# Patient Record
Sex: FEMALE | Race: BLACK OR AFRICAN AMERICAN | NOT HISPANIC OR LATINO | Employment: UNEMPLOYED | URBAN - METROPOLITAN AREA
[De-identification: names, ages, dates, MRNs, and addresses within clinical notes are randomized per-mention and may not be internally consistent; named-entity substitution may affect disease eponyms.]

---

## 2020-08-17 ENCOUNTER — HOSPITAL ENCOUNTER (EMERGENCY)
Facility: CLINIC | Age: 65
Discharge: HOME | End: 2020-08-17
Attending: NURSE PRACTITIONER | Admitting: NURSE PRACTITIONER
Payer: MEDICAID

## 2020-08-17 ENCOUNTER — HOSPITAL ENCOUNTER (INPATIENT)
Facility: CLINIC | Age: 65
LOS: 4 days | Discharge: HOME HEALTH | End: 2020-08-21
Attending: EMERGENCY MEDICINE | Admitting: HOSPITALIST
Payer: MEDICAID

## 2020-08-17 ENCOUNTER — APPOINTMENT (OUTPATIENT)
Dept: GENERAL RADIOLOGY | Facility: CLINIC | Age: 65
End: 2020-08-17
Attending: EMERGENCY MEDICINE
Payer: MEDICAID

## 2020-08-17 VITALS
RESPIRATION RATE: 20 BRPM | BODY MASS INDEX: 19.63 KG/M2 | TEMPERATURE: 98.3 F | DIASTOLIC BLOOD PRESSURE: 87 MMHG | WEIGHT: 100 LBS | SYSTOLIC BLOOD PRESSURE: 138 MMHG | HEIGHT: 60 IN | HEART RATE: 93 BPM | OXYGEN SATURATION: 96 %

## 2020-08-17 DIAGNOSIS — A41.9 SEPSIS, DUE TO UNSPECIFIED ORGANISM, UNSPECIFIED WHETHER ACUTE ORGAN DYSFUNCTION PRESENT (H): ICD-10-CM

## 2020-08-17 DIAGNOSIS — N39.0 URINARY TRACT INFECTION WITHOUT HEMATURIA, SITE UNSPECIFIED: ICD-10-CM

## 2020-08-17 DIAGNOSIS — R30.0 DYSURIA: ICD-10-CM

## 2020-08-17 DIAGNOSIS — G93.41 METABOLIC ENCEPHALOPATHY: ICD-10-CM

## 2020-08-17 DIAGNOSIS — R73.9 HYPERGLYCEMIA: ICD-10-CM

## 2020-08-17 DIAGNOSIS — N39.0 UTI (URINARY TRACT INFECTION): ICD-10-CM

## 2020-08-17 LAB
ALBUMIN SERPL-MCNC: 3.3 G/DL (ref 3.4–5)
ALBUMIN UR-MCNC: 30 MG/DL
ALP SERPL-CCNC: 90 U/L (ref 40–150)
ALT SERPL W P-5'-P-CCNC: 20 U/L (ref 0–50)
ANION GAP SERPL CALCULATED.3IONS-SCNC: 8 MMOL/L (ref 3–14)
APPEARANCE UR: ABNORMAL
AST SERPL W P-5'-P-CCNC: 14 U/L (ref 0–45)
BASE DEFICIT BLDV-SCNC: 0.6 MMOL/L
BASOPHILS # BLD AUTO: 0 10E9/L (ref 0–0.2)
BASOPHILS NFR BLD AUTO: 0 %
BILIRUB SERPL-MCNC: 0.6 MG/DL (ref 0.2–1.3)
BILIRUB UR QL STRIP: NEGATIVE
BUN SERPL-MCNC: 15 MG/DL (ref 7–30)
CALCIUM SERPL-MCNC: 9.1 MG/DL (ref 8.5–10.1)
CHLORIDE SERPL-SCNC: 105 MMOL/L (ref 94–109)
CO2 SERPL-SCNC: 22 MMOL/L (ref 20–32)
COLOR UR AUTO: YELLOW
CREAT SERPL-MCNC: 0.44 MG/DL (ref 0.52–1.04)
DIFFERENTIAL METHOD BLD: ABNORMAL
EOSINOPHIL # BLD AUTO: 0 10E9/L (ref 0–0.7)
EOSINOPHIL NFR BLD AUTO: 0 %
ERYTHROCYTE [DISTWIDTH] IN BLOOD BY AUTOMATED COUNT: 12.9 % (ref 10–15)
ETHANOL SERPL-MCNC: <0.01 G/DL
GFR SERPL CREATININE-BSD FRML MDRD: >90 ML/MIN/{1.73_M2}
GLUCOSE SERPL-MCNC: 398 MG/DL (ref 70–99)
GLUCOSE UR STRIP-MCNC: >1000 MG/DL
HCO3 BLDV-SCNC: 23 MMOL/L (ref 21–28)
HCT VFR BLD AUTO: 37.9 % (ref 35–47)
HGB BLD-MCNC: 13.9 G/DL (ref 11.7–15.7)
HGB UR QL STRIP: ABNORMAL
HYALINE CASTS #/AREA URNS LPF: 2 /LPF (ref 0–2)
IMM GRANULOCYTES # BLD: 0 10E9/L (ref 0–0.4)
IMM GRANULOCYTES NFR BLD: 0.2 %
KETONES BLD-SCNC: 1.6 MMOL/L (ref 0–0.6)
KETONES UR STRIP-MCNC: 10 MG/DL
LACTATE BLD-SCNC: 0.9 MMOL/L (ref 0.7–2)
LEUKOCYTE ESTERASE UR QL STRIP: ABNORMAL
LYMPHOCYTES # BLD AUTO: 1.6 10E9/L (ref 0.8–5.3)
LYMPHOCYTES NFR BLD AUTO: 24.8 %
MCH RBC QN AUTO: 31.6 PG (ref 26.5–33)
MCHC RBC AUTO-ENTMCNC: 36.7 G/DL (ref 31.5–36.5)
MCV RBC AUTO: 86 FL (ref 78–100)
MONOCYTES # BLD AUTO: 0.5 10E9/L (ref 0–1.3)
MONOCYTES NFR BLD AUTO: 7.9 %
MUCOUS THREADS #/AREA URNS LPF: PRESENT /LPF
NEUTROPHILS # BLD AUTO: 4.4 10E9/L (ref 1.6–8.3)
NEUTROPHILS NFR BLD AUTO: 67.1 %
NITRATE UR QL: NEGATIVE
NRBC # BLD AUTO: 0 10*3/UL
NRBC BLD AUTO-RTO: 0 /100
O2/TOTAL GAS SETTING VFR VENT: ABNORMAL %
OXYHGB MFR BLDV: 96 %
PCO2 BLDV: 35 MM HG (ref 40–50)
PH BLDV: 7.43 PH (ref 7.32–7.43)
PH UR STRIP: 7 PH (ref 5–7)
PLATELET # BLD AUTO: 118 10E9/L (ref 150–450)
PO2 BLDV: 87 MM HG (ref 25–47)
POTASSIUM SERPL-SCNC: 3.9 MMOL/L (ref 3.4–5.3)
PROT SERPL-MCNC: 8.2 G/DL (ref 6.8–8.8)
RBC # BLD AUTO: 4.4 10E12/L (ref 3.8–5.2)
RBC #/AREA URNS AUTO: >182 /HPF (ref 0–2)
SODIUM SERPL-SCNC: 135 MMOL/L (ref 133–144)
SOURCE: ABNORMAL
SP GR UR STRIP: 1.03 (ref 1–1.03)
SQUAMOUS #/AREA URNS AUTO: 2 /HPF (ref 0–1)
UROBILINOGEN UR STRIP-MCNC: NORMAL MG/DL (ref 0–2)
WBC # BLD AUTO: 6.6 10E9/L (ref 4–11)
WBC #/AREA URNS AUTO: >182 /HPF (ref 0–5)
WBC CLUMPS #/AREA URNS HPF: PRESENT /HPF

## 2020-08-17 PROCEDURE — 80053 COMPREHEN METABOLIC PANEL: CPT | Performed by: EMERGENCY MEDICINE

## 2020-08-17 PROCEDURE — 82010 KETONE BODYS QUAN: CPT | Performed by: EMERGENCY MEDICINE

## 2020-08-17 PROCEDURE — 96365 THER/PROPH/DIAG IV INF INIT: CPT

## 2020-08-17 PROCEDURE — 82805 BLOOD GASES W/O2 SATURATION: CPT | Performed by: EMERGENCY MEDICINE

## 2020-08-17 PROCEDURE — 12000000 ZZH R&B MED SURG/OB

## 2020-08-17 PROCEDURE — 87088 URINE BACTERIA CULTURE: CPT | Performed by: NURSE PRACTITIONER

## 2020-08-17 PROCEDURE — 81001 URINALYSIS AUTO W/SCOPE: CPT | Performed by: EMERGENCY MEDICINE

## 2020-08-17 PROCEDURE — 96375 TX/PRO/DX INJ NEW DRUG ADDON: CPT

## 2020-08-17 PROCEDURE — 99222 1ST HOSP IP/OBS MODERATE 55: CPT | Mod: AI | Performed by: HOSPITALIST

## 2020-08-17 PROCEDURE — 25000132 ZZH RX MED GY IP 250 OP 250 PS 637: Performed by: NURSE PRACTITIONER

## 2020-08-17 PROCEDURE — 87086 URINE CULTURE/COLONY COUNT: CPT | Performed by: NURSE PRACTITIONER

## 2020-08-17 PROCEDURE — 87040 BLOOD CULTURE FOR BACTERIA: CPT | Performed by: EMERGENCY MEDICINE

## 2020-08-17 PROCEDURE — 71045 X-RAY EXAM CHEST 1 VIEW: CPT

## 2020-08-17 PROCEDURE — 87186 SC STD MICRODIL/AGAR DIL: CPT | Performed by: NURSE PRACTITIONER

## 2020-08-17 PROCEDURE — 85025 COMPLETE CBC W/AUTO DIFF WBC: CPT | Performed by: EMERGENCY MEDICINE

## 2020-08-17 PROCEDURE — 99291 CRITICAL CARE FIRST HOUR: CPT | Mod: 25

## 2020-08-17 PROCEDURE — 83605 ASSAY OF LACTIC ACID: CPT | Performed by: EMERGENCY MEDICINE

## 2020-08-17 PROCEDURE — 99292 CRITICAL CARE ADDL 30 MIN: CPT

## 2020-08-17 PROCEDURE — 36415 COLL VENOUS BLD VENIPUNCTURE: CPT | Performed by: EMERGENCY MEDICINE

## 2020-08-17 PROCEDURE — 25800030 ZZH RX IP 258 OP 636: Performed by: EMERGENCY MEDICINE

## 2020-08-17 PROCEDURE — 80320 DRUG SCREEN QUANTALCOHOLS: CPT | Performed by: EMERGENCY MEDICINE

## 2020-08-17 PROCEDURE — 25000128 H RX IP 250 OP 636: Performed by: EMERGENCY MEDICINE

## 2020-08-17 PROCEDURE — U0003 INFECTIOUS AGENT DETECTION BY NUCLEIC ACID (DNA OR RNA); SEVERE ACUTE RESPIRATORY SYNDROME CORONAVIRUS 2 (SARS-COV-2) (CORONAVIRUS DISEASE [COVID-19]), AMPLIFIED PROBE TECHNIQUE, MAKING USE OF HIGH THROUGHPUT TECHNOLOGIES AS DESCRIBED BY CMS-2020-01-R: HCPCS | Performed by: EMERGENCY MEDICINE

## 2020-08-17 PROCEDURE — C9803 HOPD COVID-19 SPEC COLLECT: HCPCS

## 2020-08-17 PROCEDURE — 99283 EMERGENCY DEPT VISIT LOW MDM: CPT

## 2020-08-17 PROCEDURE — 96361 HYDRATE IV INFUSION ADD-ON: CPT

## 2020-08-17 RX ORDER — HALOPERIDOL 5 MG/ML
5 INJECTION INTRAMUSCULAR ONCE
Status: COMPLETED | OUTPATIENT
Start: 2020-08-17 | End: 2020-08-17

## 2020-08-17 RX ORDER — CEFTRIAXONE 2 G/1
2 INJECTION, POWDER, FOR SOLUTION INTRAMUSCULAR; INTRAVENOUS ONCE
Status: COMPLETED | OUTPATIENT
Start: 2020-08-17 | End: 2020-08-17

## 2020-08-17 RX ORDER — SODIUM CHLORIDE 9 MG/ML
INJECTION, SOLUTION INTRAVENOUS CONTINUOUS
Status: DISCONTINUED | OUTPATIENT
Start: 2020-08-17 | End: 2020-08-18

## 2020-08-17 RX ORDER — CEPHALEXIN 500 MG/1
500 CAPSULE ORAL ONCE
Status: COMPLETED | OUTPATIENT
Start: 2020-08-17 | End: 2020-08-17

## 2020-08-17 RX ORDER — CEPHALEXIN 500 MG/1
500 CAPSULE ORAL 3 TIMES DAILY
Qty: 21 CAPSULE | Refills: 0 | Status: ON HOLD | OUTPATIENT
Start: 2020-08-17 | End: 2020-08-21

## 2020-08-17 RX ADMIN — CEFTRIAXONE 2 G: 2 INJECTION, POWDER, FOR SOLUTION INTRAMUSCULAR; INTRAVENOUS at 22:50

## 2020-08-17 RX ADMIN — SODIUM CHLORIDE 1000 ML: 9 INJECTION, SOLUTION INTRAVENOUS at 21:00

## 2020-08-17 RX ADMIN — HALOPERIDOL LACTATE 5 MG: 5 INJECTION, SOLUTION INTRAMUSCULAR at 20:58

## 2020-08-17 RX ADMIN — SODIUM CHLORIDE 1000 ML: 9 INJECTION, SOLUTION INTRAVENOUS at 22:48

## 2020-08-17 RX ADMIN — CEPHALEXIN 500 MG: 500 CAPSULE ORAL at 16:52

## 2020-08-17 ASSESSMENT — ENCOUNTER SYMPTOMS
BACK PAIN: 0
AGITATION: 1
ABDOMINAL PAIN: 0
FEVER: 0
DYSURIA: 1
VOMITING: 0

## 2020-08-17 ASSESSMENT — MIFFLIN-ST. JEOR: SCORE: 915.1

## 2020-08-17 NOTE — ED AVS SNAPSHOT
Emergency Department  [address] [facility] [address]  Phone:  [phone]  Fax:  [phone]                                    Maria Guadalupe Betancourt   MRN: [id number]    Department:   Emergency Department   Date of Visit:  8/17/2020           After Visit Summary Signature Page    I have received my discharge instructions, and my questions have been answered. I have discussed any challenges I see with this plan with the nurse or doctor.    ..........................................................................................................................................  Patient/Patient Representative Signature      ..........................................................................................................................................  Patient Representative Print Name and Relationship to Patient    ..................................................               ................................................  Date                                   Time    ..........................................................................................................................................  Reviewed by Signature/Title    ...................................................              ..............................................  Date                                               Time          22EPIC Rev 08/18

## 2020-08-17 NOTE — ED TRIAGE NOTES
Pt complaining of painful urination   DM had a blood sugar of 588 per EMS   Patient coming from a motel

## 2020-08-17 NOTE — ED PROVIDER NOTES
"  History     Chief Complaint: Dysuria    The history is provided by the patient and the EMS personnel. The history is limited by the condition of the patient.      Maria Guadalupe Betancourt is a 65 year old female with a history of schizoaffective disorder, DKA, and diabetes mellitus with retinopathy who presents via EMS with dysuria. Per EMS, her blood glucose was checked at 588 en route. She notes she is not sure why she's here and reports she lives at a hotel. She states she does not want blood drawn because \"I said so.\" She states she doesn't feel good but doesn't want to be at a hospital she doesn't know. She reports she would prefer to be at [facility]. She reports she has had two days of dysuria and states she will take antibiotics if they were needed. She denies fevers, shortness of breath, vomiting, abdominal pain, back pain.    Allergies:  Sulfa antibiotics    Medications:    Novolog  Dulera inhaler  Hydroxyzine  Trazodone  Lantus   Metformin  Cefdinir    Past Medical History:    Diabetes mellitus with retinopathy  Homelessness  Cerebrovascular accident   Depression  Schizo affective disorder  DKA  Seizures  Chronic obstructive pulmonary disorder  Severe malnutrition  Tobacco use    Past Surgical History:    Forearm surgery    Family History:    Heart disease (brother)  MS (son)  Diabetes mellitus (brother, mother)  Alcohol abuse (father)    Social History:  Smoking status: Some day smoker  Alcohol use: Yes, occasionally  PCP: Physician No Ref-Primary  Presents to the ED via EMS  [place]aying [facility] [employer]  Marital Status:  Single [1]    Review of Systems   Unable to perform ROS: Other (patient is uncooperative)   Constitutional: Negative for fever.   Gastrointestinal: Negative for abdominal pain and vomiting.   Genitourinary: Positive for dysuria.   Musculoskeletal: Negative for back pain.   Psychiatric/Behavioral: Positive for agitation.   All other systems reviewed and are negative.        Physical Exam " "    Patient Vitals for the past 24 hrs:   BP Temp Temp src Pulse Resp SpO2 Height Weight   08/17/20 1500 -- -- -- -- -- 96 % -- --   08/17/20 1428 138/87 98.3  F (36.8  C) Oral 93 20 98 % 1.524 m (5') 45.4 kg (100 lb)     Physical Exam  Physical Exam   Constitutional:  Sitting up in bed and walks around the room. Hostile when asked questions \"stop asking me questions\". Disheveled. Wearing no pants.   Head: Head moves freely with normal range of motion.   ENT: Oropharynx is clear and moist.   Eyes: Conjunctivae pink. EOMs intact.   Neck: Normal range of motion.   Cardiovascular: Regular rate and rhythm. Normal heart sounds. No concerning murmur. Intact distal pulses: radial pulses 2+ on the right, 2+ on the left.   Pulmonary/Chest: No respiratory distress. No decreased breath sounds. No wheezes. No rhonchi. No rales. Lungs clear throughout.   Abdominal: Soft. Non-tender. No rebound, no guarding.   Musculoskeletal: No peripheral edema. Distal capillary refill and sensation intact.   Neurological: Hostile. Refuses to answer all questions. She is oriented to place and self. She can tell me where she lives.   Skin: Skin is warm and normal in color. No rash noted.      Emergency Department Course     Laboratory:  Urinalysis with micro: Glucose >1000, Ketones 10, Moderate blood, Protein albumin 30, Large leukocytes, WBC >182 (H), RBC >182 (H), Squamous epithelial 2 (H), WBC clumps present, Mucous present, o/w Negative  Urine culture: pending    Procedures: None.    Interventions:  1652 Keflex 500 mg PO    Emergency Department Course:  Past medical records, nursing notes, and vitals reviewed.  1518: I performed an exam of the patient and obtained history, as documented above.    The patient provided a urine sample here in the emergency department. This was sent for laboratory testing, findings above.    1526: I rechecked the patient and gave her a warm blanket, per her request.     1532: I tried to contact the patient's " granddaughter, Rishabh Whiting, at [phone], whose voicemail box was full. I was unable to leave a message.     1535: I spoke with Rafaela, our care coordinator RN, about a plan for the patient.     1544: I attempted to contact the patient's psych social worker, Kalpana, and left a voicemail at [phone].    1549: I attempted to contact the patient's sisters, Nery and Jerri.    1555: I spoke with the patient's psych social worker Kalpana, who clarified the patient is staying at the [facility] on [address] and is receiving home care there.     1602: I rechecked the patient. Findings and plan explained to the patient. Patient discharged home with instructions regarding supportive care, medications, and reasons to return. The importance of close follow-up was reviewed.     1641: I rechecked the patient, who was frustrated about the wait for her cab.    Impression & Plan      Medical Decision Making:  Maria Guadalupe Betancourt is a 65 year old female who presents for evaluation of dysuria. She has paranoid schizophrenia and was in a homeless shelter until COVID when she was placed at [facility] on [address]. She has a CADI waiver and  and has nursing services that help with medications at the [facility]. I spoke with Kalpana arias psych social worker [phone] today and she assures me that the patient is typically hostile and uncooperative and has hyperglycemia with every visit and has people checking in on her daily. I was unable to get a hold of any family today. I tried the [place] and her sisters Nery Barber and Keila with no answers.   Urinalysis is suggestive of the infection and hyperglycemia. There has been no fever per patient and no fever here. There is no clinical evidence of pyelonephritis or acute surgical abdomen. The patient will be started on antibiotics for the infection. In care everywhere her renal function has been normal last  checked 6/19/20 and creat was 0.88 ([facility]). The patient refused any blood work. She does not meet SIRS criteria based on vitals. First dose of Keflex given here and Kalpana advised I e-Rx to [facility] on [address] and the hotel will  her medications and the nurse will distribute her medications. Urine culture pending. Cab voucher back to [facility].     Diagnosis:    ICD-10-CM    1. UTI (urinary tract infection)  N39.0    2. Dysuria  R30.0    3. Hyperglycemia  R73.9      Disposition: discharged to [facility] with Keflex 500 mg TID for 7 days    Discharge Medications:  New Prescriptions    CEPHALEXIN (KEFLEX) 500 MG CAPSULE    Take 1 capsule (500 mg) by mouth 3 times daily for 7 days     Robb CHAPPELL, am serving as a scribe at 3:27 PM on 8/17/2020 to document services personally performed by Doreen Álvarez APRN CNP based on my observations and the provider's statements to me.     Robb Wallace  8/17/2020    EMERGENCY DEPARTMENT       Doreen Álvarez APRN CNP  08/17/20 4876

## 2020-08-17 NOTE — ED NOTES
This is patients current information.    Kalpana  ---[facility] [phone].  She is very helpful.  This patient is staying at the [facility] which was converted to a homeless shelter during covid.    Patient Demographics    Patient Address Communication Language Race / Ethnicity Marital Status   [employer]  [address] [place], [place] [address] [phone] (Mobile)  [phone] (Home) English - Spoken (Preferred) Black or  / Not  or  Unknown   Patient Contacts    Contact Name Contact Address Communication Relationship to Patient   Joseph Whiting Unknown [phone] (Mobile) Son, Emergency Contact   Nery Barber Unknown [phone] (Mobile) Sister, Emergency Contact   Roselyn Amena [place], [place] [address] [phone] (Mobile) Cousin, Emergency Contact   Rishabh Schroederddell Unknown [phone] (Mobile) Granddaughter, Emergency Contact

## 2020-08-17 NOTE — ED NOTES
Pt yelling wanting to leave - standing up taking off gown blood pressure cuff   Doreen Álvarez with patient

## 2020-08-18 PROBLEM — N39.0 UTI (URINARY TRACT INFECTION): Status: ACTIVE | Noted: 2020-08-18

## 2020-08-18 LAB
BACTERIA SPEC CULT: ABNORMAL
GLUCOSE BLDC GLUCOMTR-MCNC: 334 MG/DL (ref 70–99)
LABORATORY COMMENT REPORT: NORMAL
Lab: ABNORMAL
SARS-COV-2 RNA SPEC QL NAA+PROBE: NEGATIVE
SARS-COV-2 RNA SPEC QL NAA+PROBE: NORMAL
SPECIMEN SOURCE: ABNORMAL
SPECIMEN SOURCE: NORMAL
SPECIMEN SOURCE: NORMAL

## 2020-08-18 PROCEDURE — 99232 SBSQ HOSP IP/OBS MODERATE 35: CPT | Performed by: INTERNAL MEDICINE

## 2020-08-18 PROCEDURE — 25000131 ZZH RX MED GY IP 250 OP 636 PS 637: Performed by: HOSPITALIST

## 2020-08-18 PROCEDURE — 25800030 ZZH RX IP 258 OP 636: Performed by: INTERNAL MEDICINE

## 2020-08-18 PROCEDURE — 12000000 ZZH R&B MED SURG/OB

## 2020-08-18 PROCEDURE — 00000146 ZZHCL STATISTIC GLUCOSE BY METER IP

## 2020-08-18 RX ORDER — ONDANSETRON 4 MG/1
4 TABLET, ORALLY DISINTEGRATING ORAL EVERY 6 HOURS PRN
Status: DISCONTINUED | OUTPATIENT
Start: 2020-08-18 | End: 2020-08-21 | Stop reason: HOSPADM

## 2020-08-18 RX ORDER — SODIUM CHLORIDE, SODIUM LACTATE, POTASSIUM CHLORIDE, CALCIUM CHLORIDE 600; 310; 30; 20 MG/100ML; MG/100ML; MG/100ML; MG/100ML
INJECTION, SOLUTION INTRAVENOUS CONTINUOUS
Status: DISCONTINUED | OUTPATIENT
Start: 2020-08-18 | End: 2020-08-20

## 2020-08-18 RX ORDER — PROCHLORPERAZINE 25 MG
12.5 SUPPOSITORY, RECTAL RECTAL EVERY 12 HOURS PRN
Status: DISCONTINUED | OUTPATIENT
Start: 2020-08-18 | End: 2020-08-19

## 2020-08-18 RX ORDER — POLYETHYLENE GLYCOL 3350 17 G/17G
17 POWDER, FOR SOLUTION ORAL DAILY PRN
Status: DISCONTINUED | OUTPATIENT
Start: 2020-08-18 | End: 2020-08-21 | Stop reason: HOSPADM

## 2020-08-18 RX ORDER — NALOXONE HYDROCHLORIDE 0.4 MG/ML
.1-.4 INJECTION, SOLUTION INTRAMUSCULAR; INTRAVENOUS; SUBCUTANEOUS
Status: DISCONTINUED | OUTPATIENT
Start: 2020-08-18 | End: 2020-08-21 | Stop reason: HOSPADM

## 2020-08-18 RX ORDER — BISACODYL 10 MG
10 SUPPOSITORY, RECTAL RECTAL DAILY PRN
Status: DISCONTINUED | OUTPATIENT
Start: 2020-08-18 | End: 2020-08-21 | Stop reason: HOSPADM

## 2020-08-18 RX ORDER — PROCHLORPERAZINE MALEATE 5 MG
5 TABLET ORAL EVERY 6 HOURS PRN
Status: DISCONTINUED | OUTPATIENT
Start: 2020-08-18 | End: 2020-08-19

## 2020-08-18 RX ORDER — AMOXICILLIN 250 MG
2 CAPSULE ORAL 2 TIMES DAILY PRN
Status: DISCONTINUED | OUTPATIENT
Start: 2020-08-18 | End: 2020-08-21 | Stop reason: HOSPADM

## 2020-08-18 RX ORDER — NICOTINE 21 MG/24HR
1 PATCH, TRANSDERMAL 24 HOURS TRANSDERMAL DAILY
Status: DISCONTINUED | OUTPATIENT
Start: 2020-08-18 | End: 2020-08-21 | Stop reason: HOSPADM

## 2020-08-18 RX ORDER — CEFTRIAXONE 2 G/1
2 INJECTION, POWDER, FOR SOLUTION INTRAMUSCULAR; INTRAVENOUS EVERY 24 HOURS
Status: DISCONTINUED | OUTPATIENT
Start: 2020-08-18 | End: 2020-08-21

## 2020-08-18 RX ORDER — ACETAMINOPHEN 325 MG/1
650 TABLET ORAL EVERY 4 HOURS PRN
Status: DISCONTINUED | OUTPATIENT
Start: 2020-08-18 | End: 2020-08-21 | Stop reason: HOSPADM

## 2020-08-18 RX ORDER — ONDANSETRON 2 MG/ML
4 INJECTION INTRAMUSCULAR; INTRAVENOUS EVERY 6 HOURS PRN
Status: DISCONTINUED | OUTPATIENT
Start: 2020-08-18 | End: 2020-08-21 | Stop reason: HOSPADM

## 2020-08-18 RX ORDER — ACETAMINOPHEN 650 MG/1
650 SUPPOSITORY RECTAL EVERY 4 HOURS PRN
Status: DISCONTINUED | OUTPATIENT
Start: 2020-08-18 | End: 2020-08-21 | Stop reason: HOSPADM

## 2020-08-18 RX ORDER — LIDOCAINE 40 MG/G
CREAM TOPICAL
Status: DISCONTINUED | OUTPATIENT
Start: 2020-08-18 | End: 2020-08-21 | Stop reason: HOSPADM

## 2020-08-18 RX ORDER — NICOTINE POLACRILEX 4 MG
15-30 LOZENGE BUCCAL
Status: DISCONTINUED | OUTPATIENT
Start: 2020-08-18 | End: 2020-08-21 | Stop reason: HOSPADM

## 2020-08-18 RX ORDER — DEXTROSE MONOHYDRATE 25 G/50ML
25-50 INJECTION, SOLUTION INTRAVENOUS
Status: DISCONTINUED | OUTPATIENT
Start: 2020-08-18 | End: 2020-08-21 | Stop reason: HOSPADM

## 2020-08-18 RX ORDER — AMOXICILLIN 250 MG
1 CAPSULE ORAL 2 TIMES DAILY PRN
Status: DISCONTINUED | OUTPATIENT
Start: 2020-08-18 | End: 2020-08-21 | Stop reason: HOSPADM

## 2020-08-18 RX ORDER — LANOLIN ALCOHOL/MO/W.PET/CERES
3 CREAM (GRAM) TOPICAL
Status: DISCONTINUED | OUTPATIENT
Start: 2020-08-18 | End: 2020-08-21 | Stop reason: HOSPADM

## 2020-08-18 RX ADMIN — INSULIN ASPART 8 UNITS: 100 INJECTION, SOLUTION INTRAVENOUS; SUBCUTANEOUS at 18:03

## 2020-08-18 RX ADMIN — SODIUM CHLORIDE, POTASSIUM CHLORIDE, SODIUM LACTATE AND CALCIUM CHLORIDE: 600; 310; 30; 20 INJECTION, SOLUTION INTRAVENOUS at 16:16

## 2020-08-18 ASSESSMENT — ACTIVITIES OF DAILY LIVING (ADL)
DRESS: 0-->INDEPENDENT
AMBULATION: 0-->INDEPENDENT
TRANSFERRING: 0-->INDEPENDENT
FALL_HISTORY_WITHIN_LAST_SIX_MONTHS: YES
ADLS_ACUITY_SCORE: 16
ADLS_ACUITY_SCORE: 15
ADLS_ACUITY_SCORE: 13
COGNITION: 0 - NO COGNITION ISSUES REPORTED
NUMBER_OF_TIMES_PATIENT_HAS_FALLEN_WITHIN_LAST_SIX_MONTHS: 2
RETIRED_EATING: 0-->INDEPENDENT
BATHING: 0-->INDEPENDENT
TOILETING: 0-->INDEPENDENT
RETIRED_COMMUNICATION: 0-->UNDERSTANDS/COMMUNICATES WITHOUT DIFFICULTY
SWALLOWING: 0-->SWALLOWS FOODS/LIQUIDS WITHOUT DIFFICULTY

## 2020-08-18 NOTE — PROGRESS NOTES
JOSE MIGUEL  D:  Received a call from Micaela at [facility] which [place].  The general number is [phone].  Patient is open to their services for weekly RN viisits.  The office lists patient's address as [address] [place] [address] which is the [facility].  Micaela reports the home care  is wanting to speak with the hospital SW.  Micaela asked for patient's admit status and admitting diagnosis and that a H&P, be faxed to [phone].  Brief view of admitting H&P note.  SW and Care Transitions RN will follow for discharge planning.

## 2020-08-18 NOTE — PROGRESS NOTES
[facility]    Hospitalist Progress Note    Interval History   - Due to bed availability issues patient did not transfer out of the ED until around noon today. She was reportedly refusing all cares in the ED.  - Patient is sleeping, declines to participate in interview or exam, from my observation I am concerned about psychiatric decompensation    Assessment & Plan   Summary: Maria Guadalupe Betancourt is a 65 year old female with PMH chronic paranoid schizoaffective disorder, Type 2 Diabetes mellitus causing retinopathy, and reportedly cirrhosis, Hepatitis C, and COPD, as well as prior seizure and stroke, who was admitted 8/17/2020 with UTI, encephalopathy, and hyperlgycemia.    E coli UTI  Patient is homeless, presented with dysuria. Noted fever to 100.8F here, no leukocytosis. UA growing >100k E coli.  - Continue Ceftriaxone  - Continue LR @ 100ml/hr    Possible psychiatric decompensation  Acute toxic versus metabolic encephalopathy  Patient reportedly declined most cares on presentation in the ED. Somnolent overnight after receiving 5 mg Haldol in the ED for spitting, kicking and screaming. Currently very little interaction.  - Appreciate Psychiatry consult     Hyperglycemia  Type 2 Diabetes mellitus  Likely uncontrolled. Most recent A1c 15.2 in 2/2020. Prescribed Glargine, Aspart, and Metformin as an outpatient. Presented with hyperglycemia and elevated Ketones but without metabolic acidosis. Elevated Ketones could be due to starvation/dehydration.  - Will start on Lantus 20 units daily  - Continue sliding scale insulin     COPD: No meds reconciled     Thrombocytopenia  Hx ccirrhosis  Hx Hep C   Thrombocytopenia is mild here.      Prior seizure and stroke: Reportedly; I do not have further details of this history at present    DVT Prophylaxis: Pneumatic Compression Devices  Code Status: Full Code  PT/OT: ordered  Diet: Combination Diet Regular Diet Adult; 0599-3832 Calories: Moderate Consistent CHO (4-6  CHO units/meal)      Disposition: Expected discharge in 2-3 days    Hakan Stout MD  Text Page  (7am to 6pm)  -Data reviewed today: I reviewed all new labs and imaging results over the last 24 hours.    Physical Exam   Temp: 100.8  F (38.2  C) Temp src: Axillary BP: (!) 143/92 Pulse: 96 Heart Rate: 108 Resp: 17 SpO2: 97 % O2 Device: None (Room air)    There were no vitals filed for this visit.  Vital Signs with Ranges  Temp:  [98.3  F (36.8  C)-100.8  F (38.2  C)] 100.8  F (38.2  C)  Pulse:  [] 96  Heart Rate:  [108] 108  Resp:  [17-20] 17  BP: (132-176)/() 143/92  SpO2:  [96 %-99 %] 97 %  No intake/output data recorded.  O2 requirements: none    Constitutional: Thin, frail, female, sleeping, minimally interactive  Respiratory: CTAB poor erffort  Vascular: No LE pitting edema  Skin/Integumen: No rashes noted  Neuro/Psych: Minimally interactive    Patient declined cardiovascular and GI exam    Medications     lactated ringers         cefTRIAXone  2 g Intravenous Q24H     insulin aspart  1-10 Units Subcutaneous TID AC     insulin aspart  1-7 Units Subcutaneous At Bedtime     nicotine  1 patch Transdermal Daily     nicotine   Transdermal Q8H     sodium chloride (PF)  3 mL Intracatheter Q8H       Data   Recent Labs   Lab 08/17/20  2222   WBC 6.6   HGB 13.9   MCV 86   *      POTASSIUM 3.9   CHLORIDE 105   CO2 22   BUN 15   CR 0.44*   ANIONGAP 8   LAKESHA 9.1   *   ALBUMIN 3.3*   PROTTOTAL 8.2   BILITOTAL 0.6   ALKPHOS 90   ALT 20   AST 14       Imaging:   Recent Results (from the past 24 hour(s))   XR Chest Port 1 View    Narrative    EXAM: XR CHEST PORT 1 VW  LOCATION: [facility]  DATE/TIME: 8/17/2020 10:56 PM    INDICATION: Fever  COMPARISON: 03/29/2015      Impression    IMPRESSION: Heart size and pulmonary vascularity are normal. The lungs are clear.

## 2020-08-18 NOTE — ED NOTES
"Pt aggitated and aggressive with staff.  Spitting and attempting to bite staff.  Unable to draw blood or place an iv without soft restraints applied.  Pt yelling \"I'm gonna get you, fuck you stupid bitch.\"  "

## 2020-08-18 NOTE — ED PROVIDER NOTES
History     Chief Complaint:  Altered Mental Status    The history is provided by the EMS personnel. The history is limited by the condition of the patient.      Maria Guadalupe Betancourt is a 65 year old female with a history of schizoaffective disorder, DKA, and diabetes mellitus  who presents to the emergency department for evaluation of altered mental status. The patient was seen and evaluated earlier this evening in the emergency department for dysuria. At that time the patient had urine testing done that was concerning for a UTI and she was discharged home with a prescription for Keflex. At discharge the patient was returned to a hotel in [place] that EMS reports is housing several homeless individuals who may have Covid-19. At the hotel, staff reports that the patient has been agitated and yelling. They became concerned about behavior and called EMS. EMS states that the patient was cooperative with them, until they tried to place an IV. She was vitally stable, but EMS notes that her blood sugar was 504. The patient was unable to provide any history at this time. The patient is noted to appear agitated, yelling and attempting to bite staff.    Allergies:  Sulfur    Medications:    Keflex  Novolog  Dulera inhaler  Hydroxyzine  Lantus  Trazodone  Metformin  Cefdinir    Past Medical History:    Diabetes   Homelessness  Cerebrovascular accident  Depression  Schizoaffective disorder  DKA  Seizures  Chronic obstructive pulmonary disorder  Severe malnutrition  Tobacco use     Past Surgical History:    Forearm surgery    Family History:    Mother: diabetes  Father: diabetes, alcohol abuse  Brother: heart diease, diabetes    Social History:  The patient was accompanied to the ED by EMS.  Smoking Status: Current some day  Smokeless Tobacco: not on file  Alcohol Use: Not currently  Drug Use: Not on file  Marital Status:  Single      Review of Systems   Unable to perform ROS: Mental status change       Physical Exam    Vitals:  Patient Vitals for the past 24 hrs:   BP Temp Temp src Pulse Heart Rate Resp SpO2   08/17/20 2300 (!) 176/96 -- -- 92 -- -- 98 %   08/17/20 2245 (!) 161/95 -- -- 100 -- -- 98 %   08/17/20 2230 (!) 165/96 -- -- 101 -- -- 99 %   08/17/20 2222 -- 100.8  F (38.2  C) Axillary -- -- -- --   08/17/20 2054 (!) 132/108 -- -- 108 108 18 98 %       Physical Exam  Vitals: reviewed by me  General: Pt seen on [facility], intermittently combative, somewhat somnolent appearing as well.  Eyes: Tracking well, clear conjunctiva BL  ENT: MMM, midline trachea.   Lungs:   No tachypnea, no accessory muscle use. No respiratory distress.   CV: Rate as above, regular rhythm.    Abd: Soft, non tender, no guarding, no rebound. Non distended  MSK: no peripheral edema or joint effusion.  No evidence of trauma  Skin: No rash, normal turgor and temperature  Neuro: Clear speech when she does speak and no facial droop.  Moving all extremities spontaneously.  Fighting with good strength throughout all extremities.  Psych: Not RIS, no e/o AH/      Emergency Department Course     Imaging:  Radiographic findings were communicated with the patient who voiced understanding of the findings.    XR Chest Port 1 View  Heart size and pulmonary vascularity are normal. The lungs are clear.  Reading per radiology.    Laboratory:  CBC:  (L) o/w WNL. (WBC 6.6, HGB 13.9)   CMP: Glucose 398 (H), Creatinine 0.44 (L), Albumin 3.3 (L) o/w AWNL    Lactic Acid (Collected at 2219): 0.9     Blood Gas Venous and Oxyhgb: pH: 7.43, PCO2: 35 (L), PO2: 87 (H), Bicarbonate: 23, Oxyhgb: 96, Base deficit Venous: 0.6    Alcohol Ethyl: <0.01  Ketone Beta-Hydroxybutyrate Quantitative: 1.6 (H)    Blood Culture x2: Pending  Symptomatic Covid-19 Virus by PCR: Pending    Interventions:  2058 Haldol 5 mg IV  2248 NS, 1 L, IV bolus  2250 Rocephin 2 g IV    Emergency Department Course:  Past medical records, nursing notes, and vitals reviewed.    2054 I performed an  exam of the patient as documented above.     IV was inserted and blood was drawn for laboratory testing, results above.    2250 I rechecked the patient.     2321 I spoke with Dr. Gonzalez, hospitalist, who agreed to admit the patient.     Findings and plan explained to the Patient who consents to admission. Discussed the patient with Dr. Gonzalez, who will admit the patient to an inpatient [facility] bed for further monitoring, evaluation, and treatment.    I personally reviewed the laboratory results with the admitting phsycian and answered all related questions prior to admission.      Impression & Plan      Covid-19  Maria Guadalupe Betancourt was evaluated during a global COVID-19 pandemic, which necessitated consideration that the patient might be at risk for infection with the SARS-CoV-2 virus that causes COVID-19.   Applicable protocols for evaluation were followed during the patient's care.   COVID-19 was considered as part of the patient's evaluation. The plan for testing is:  a test was obtained during this visit.     Medical Decision Making:  This is a 65-year-old female presents the emergency room with appears to be metabolic encephalopathy in the setting of hyperglycemia.  She initially was quite combative, requiring Haldol, but now is doing better with IV fluids.  Her urinalysis before does show a robust UTI, and it is unclear if she got her antibiotics orally when she left, as such I have begun treatment for her UTI which I do think is making her mentation affected.  She has hyperglycemia and elevated ketones, but no acidosis as yet.  Will admit to the care of Dr. Lopez, no indication for an insulin drip at this time.  She will be monitored very carefully until next available inpatient bed.      Critical Care time:  was 35 minutes for this patient excluding procedures.    Diagnosis:    ICD-10-CM    1. Sepsis, due to unspecified organism, unspecified whether acute organ dysfunction present (H)  A41.9    2.  Urinary tract infection without hematuria, site unspecified  N39.0    3. Metabolic encephalopathy  G93.41    4. Hyperglycemia  R73.9        Disposition:  Admitted to the hospital under the care of Dr. Lisa CHAPPELL, Jose Quezada, am serving as a scribe on 8/17/2020 at 9:31 PM to personally document services performed by Rosas Hernandez* based on my observations and the provider's statements to me.   Jose Quezada  8/17/2020    EMERGENCY DEPARTMENT       Rosas Hernandez MD  08/17/20 4223

## 2020-08-18 NOTE — ED PROVIDER NOTES
I took over care of this patient from my partner, Dr. Garibay, at approximately 0615.    Patient boarding in ED for medical bed, prolonged due to COVID precautions and limited bed/staffing upstairs.  Patient transported upstairs for admission (accepted by Dr. Gonzalez, hospitalist late last night) late this morning.    MD Héctor Vazquez, Trevin Garduno MD  08/18/20 3277

## 2020-08-18 NOTE — PROGRESS NOTES
"Pt refusing all cares upon transfer to . Educated pt on plan of care, vitals, falls interventions, blood glucose monitoring, what medications were ordered and why. Pt yelled, \"Why don't you all get the fuck away from me. I don't want you to do anything.\"Belongings documented and remain in room. Bed alarm on, refusing yellow socks. Will continue to monitor.   "

## 2020-08-18 NOTE — PROGRESS NOTES
RECEIVING UNIT ED HANDOFF REVIEW    ED Nurse Handoff Report was reviewed by: Chelsea Ramos RN on August 18, 2020 at 11:27 AM

## 2020-08-18 NOTE — PROVIDER NOTIFICATION
MD Notification    Notified Person: MD    Notified Person Name: Girish    Notification Date/Time: 8/18/20 1217    Notification Interaction: Paged    Purpose of Notification: COVID negative     Orders Received: Precautions discontinued.     Comments:

## 2020-08-18 NOTE — H&P
[facility]    History and Physical  Hospitalist       Date of Admission:  8/17/2020  Date of Service (when I saw the patient): 08/17/20    ASSESSMENT  Maria Guadalupe Betancourt is a 65 year old homeless woman smoker with past history that is most significant for chronic paranoid schizoaffective disorder, Type 2 Diabetes mellitus causing retinopathy, and reportedly cirrhosis, Hepatitis C, and COPD, as well as prior seizure and stroke; who presents with agitation, fever and tachycardia and is found to have pyuria and septic encephalopathy most likely due to UTI, as well as hyperglycemia.    PLAN    1) Septic encephalopathy most likely due to UTI:     -- Inpatient. Ceftriaxone continued. Follow up cultures. 150 ml/hour LR IV fluid. Tylenol and anti-emetics as needed.    2) Acute metabolic encephalopathy: Currently somnolent after receiving 5 mg Haldol in the ED for spitting, kicking and screaming.    -- Psychiatry consulted    3) Hyperglycemia and Type 2 Diabetes mellitus: Likely uncontrolled. Most recent A1c 15.2 in 2/2020 . Prescribed Glargine, Aspart, and Metformin as an outpatient. Currently she has hyperglycemia and elevated Ketones but without metabolic acidosis. Elevated Ketones could be due to starvation/dehydration.    -- ISS insulin while hospitalized. Hold oral anti-diabetic medications. Resume home insulin when verified.    4) COPD: Resume home inhalers when verified    5) Thrombocytopenia: MIld. Reportedly she has a history of cirrhosis and Hep C which could be the cause.    -- Monitor as needed    6) Prior seizure and stroke: Reportedly; I do not have further details of this history at present    Rule Out COVID-19 infection  This patient was evaluated during a global COVID-19 pandemic, which necessitated consideration that the patient might be at risk for infection with the SARS-CoV-2 virus that causes COVID-19. Applicable protocols for evaluation were followed during the patient's care. LOW  "suspicion for infection.   -follow-up COVID-19 PCR test result  -droplet, contact precautions    Chief Complaint   Dysuria     Unable to obtain a history from the patient due to confusion; history obtained from the ED physician whom I have spoken with    History of Present Illness   Maria Guadalupe Betancourt is a 65 year old woman who reportedly initially presented earlier today in the ED with complaints of two days of dysuria while staying at a [employer] [facility]\", and found to have evidence of high blood sugar and UTI and declined further evaluation, and was prescribed Keflex and discharged back to her hotel; it seems she returned this evening now with new onset confusion. She was markedly agitated reportedly and received 5 mg IV Haldol with relief of that. Now she is too somnolent for further history provision.    In the ED, Blood pressure (!) 176/96, pulse 92, temperature 100.8  F (38.2  C), temperature source Axillary, resp. rate 18, SpO2 98 %.    Pulse as high as 108 in the ED.     CBC and CMP were done and notable for , alb 3.3, otherwise were within the normal reference range. Glucose was 398, Ketones 1.6, and CO2 was 22 with VBG showing 7.43/35. Lactate was 0.9. UA showed both greater than 182 WBC and RBC.  Ethanol level negative. CXR showed no acute pathology. Blood and urine cultures and COVID assay were sent and she was given IV fluid and Ceftriaxone in the ED.    PHYSICAL EXAM  Blood pressure (!) 176/96, pulse 92, temperature 100.8  F (38.2  C), temperature source Axillary, resp. rate 18, SpO2 98 %.  Constitutional: Somnolent but arousable; no apparent distress  HEENT: normocephalic dry mucus membranes  Respiratory: lungs clear to auscultation bilaterally  Cardiovascular: regular S1 S2   GI: abdomen soft non tender non distended bowel sounds positive  Lymph/Hematologic: no pallor, no cervical lymphadenopathy  Skin: no rash, good turgor  Musculoskeletal: no clubbing, cyanosis or edema  Neurologic: " extra-ocular muscles intact; moves all four extremities  Psychiatric: GCS 12; currently she has limited insight and judgment     DVT Prophylaxis: Pneumatic Compression Devices due to thrombocytopenia  Code Status: Full Code presumed for now    Disposition: Expected discharge in 2-3 days    Santiago Gonzalez MD    Past Medical History    I have reviewed this patient's medical history and updated it with pertinent information if needed.   Past Medical History:   Diagnosis Date     Cirrhosis (H)      CVA (cerebral vascular accident) (H)      Encounter for other orthopedic aftercare     Multiple bone fractures in the past     Hepatitis C      HSV (herpes simplex virus) anogenital infection     Ocular     Lung abscess (H)      Schizophrenia (H)      Seizure (H)      Syncope and collapse      Type 2 diabetes mellitus (H)        Past Surgical History   I have reviewed this patient's surgical history and updated it with pertinent information if needed.  Past Surgical History:   Procedure Laterality Date     ------------OTHER-------------      Left wrist surgery       Prior to Admission Medications   Prior to Admission Medications   Prescriptions Last Dose Informant Patient Reported? Taking?   cephALEXin (KEFLEX) 500 MG capsule   No No   Sig: Take 1 capsule (500 mg) by mouth 3 times daily for 7 days      Facility-Administered Medications: None     Allergies   Allergies   Allergen Reactions     Sulfur        Social History   I have reviewed this patient's social history and updated it with pertinent information if needed. Maria Guadalupe CHIARA Betancourt  reports that she has been smoking. She does not have any smokeless tobacco history on file. She reports previous alcohol use.    Family History   Family history assessed and, except as above, is non-contributory.    Review of Systems   The 10 point Review of Systems is negative other than noted in the HPI or here.     Primary Care Physician   Physician No Ref-Primary    Data   Labs  Ordered and Resulted from Time of ED Arrival Up to the Time of Departure from the ED   BLOOD GAS VENOUS AND OXYHGB - Abnormal; Notable for the following components:       Result Value    PCO2 Venous 35 (*)     PO2 Venous 87 (*)     All other components within normal limits   CBC WITH PLATELETS DIFFERENTIAL - Abnormal; Notable for the following components:    MCHC 36.7 (*)     Platelet Count 118 (*)     All other components within normal limits   COMPREHENSIVE METABOLIC PANEL - Abnormal; Notable for the following components:    Glucose 398 (*)     Creatinine 0.44 (*)     Albumin 3.3 (*)     All other components within normal limits   KETONE BETA-HYDROXYBUTYRATE QUANTITATIVE - Abnormal; Notable for the following components:    Ketone Quantitative 1.6 (*)     All other components within normal limits   ALCOHOL ETHYL   LACTIC ACID WHOLE BLOOD   COVID-19 VIRUS (CORONAVIRUS) BY PCR   BLOOD CULTURE   BLOOD CULTURE       Data reviewed today:  I personally reviewed the chest x-ray image(s) showing no acute pathology.    Recent Results (from the past 24 hour(s))   XR Chest Port 1 View    Narrative    EXAM: XR CHEST PORT 1 VW  LOCATION: [facility]  DATE/TIME: 8/17/2020 10:56 PM    INDICATION: Fever  COMPARISON: 03/29/2015      Impression    IMPRESSION: Heart size and pulmonary vascularity are normal. The lungs are clear.

## 2020-08-18 NOTE — PLAN OF CARE
A&O-pt uncooperative and unwilling to answer most assessment questions. CMS intact. Bowel sounds hypoactive, passing flatus, tolerating regular diet. VSS. Up with SBA-refusing gait belt. Denies pain. Pt scoring yellow on the Aggression Stop Light Tool. Plan pending psych consult.

## 2020-08-18 NOTE — ED NOTES
Bed: ST02  Expected date:   Expected time:   Means of arrival:   Comments:  534 64f low blood sugar altered LOC

## 2020-08-18 NOTE — ED NOTES
"Applied monitoring devices (EKG, BP, and pulse ox) onto Patient.  Patient kept trying to spit on staff so spithood was applied.   Patient was attempting to bite staff.   Patient placed into soft restraints on wrists.   Patient then began trying to kick staff so soft restraints were placed on her ankles.   Patient told this staff \" I fucking hate you. When I get off this table i'm going to spit in you'r face and everything\"   Patient continues to yell vulgar slurs at staff at this time.   "

## 2020-08-18 NOTE — UTILIZATION REVIEW
Admission Status; Secondary Review Determination    Under the authority of the Utilization Management Committee, the utilization review process indicated a secondary review on the above patient. The review outcome is based on review of the medical records, discussions with staff, and applying clinical experience noted on the date of the review.    (x) Inpatient Status Appropriate - This patient's medical care is consistent with medical management for inpatient care and reasonable inpatient medical practice.    RATIONALE FOR DETERMINATION  Maria Guadalupe Betancourt is a 65 year old homeless female with chronic paranoid schizoaffective disorder, Type 2 Diabetes mellitus, cirrhosis, Hepatitis C, and COPD who presented with agitation and fever. She was noted to have tachycardia,  pyuria and hyperglycemia with elevated ketones but without metabolic acidosis.  Urine sample is suggestive for infection and patient felt to have septic encephalopathy.  She is requiring IVF, IV antibiotics and close monitoring.      At the time of admission with the information available to the attending physician more than 2 nights Hospital complex care was anticipated, based on patient risk of adverse outcome if treated as outpatient and complex care required. Inpatient admission is appropriate based on the Medicare guidelines.    The information on this document is developed by the utilization review team in order for the business office to ensure compliance. This only denotes the appropriateness of proper admission status and does not reflect the quality of care rendered.    The definitions of Inpatient Status and Observation Status used in making the determination above are those provided in the CMS Coverage Manual, Chapter 1 and Chapter 6, section 70.4.    Sincerely,    Aster Schultz MD   Utilization Review  Physician Advisor  [facility].

## 2020-08-18 NOTE — ED NOTES
[facility]  ED Nurse Handoff Report    ED Chief complaint: Altered Mental Status      ED Diagnosis:   Final diagnoses:   Sepsis, due to unspecified organism, unspecified whether acute organ dysfunction present (H)   Urinary tract infection without hematuria, site unspecified   Metabolic encephalopathy   Hyperglycemia       Code Status: Full Code    Allergies:   Allergies   Allergen Reactions     Sulfur        Patient Story: altered mental status  Focused Assessment:  65 year old female with a history of schizoaffective disorder, DKA, and diabetes mellitus  who presents to the emergency department for evaluation of altered mental status. The patient was seen and evaluated earlier this evening in the emergency department for dysuria. At that time the patient had urine testing done that was concerning for a UTI and she was discharged home with a prescription for Keflex. At discharge the patient was returned to a hotel in [place] that EMS reports is housing several homeless individuals who may have Covid-19. At the hotel, staff reports that the patient has been agitated and yelling. They became concerned about behavior and called EMS. EMS states that the patient was cooperative with them, until they tried to place an IV. She was vitally stable, but EMS notes that her blood sugar was 504. The patient was unable to provide any history at this time. The patient is noted to appear agitated, yelling and attempting to bite staff.  Required spit clarke, and medical restraints to get bloodwork and IV placed.    Treatments and/or interventions provided: bolus x2, rocephin, haldol  Patient's response to treatments and/or interventions: stable    To be done/followed up on inpatient unit:  monitor  - will need 1:1    Does this patient have any cognitive concerns?:  Altered, agitated    Activity level - Baseline/Home:  Independent  Activity Level - Current:   Stand with Assist    Patient's Preferred language:  English   Needed?: No    Isolation: None  Infection: Not Applicable  Bariatric?: No    Vital Signs:   Vitals:    08/17/20 2222 08/17/20 2230 08/17/20 2245 08/17/20 2300   BP:  (!) 165/96 (!) 161/95 (!) 176/96   Pulse:  101 100 92   Resp:       Temp: 100.8  F (38.2  C)      TempSrc: Axillary      SpO2:  99% 98% 98%       Cardiac Rhythm:     Was the PSS-3 completed:   No - refused to answer questions  What interventions are required if any?               Family Comments: none, has , phone number in chart and business card in pt's belongings   OBS brochure/video discussed/provided to patient/family: N/A              Name of person given brochure if not patient: x              Relationship to patient: x    For the majority of the shift this patient's behavior was Yellow.   Behavioral interventions performed were medical restraints temporarily, haldol IM.    ED NURSE PHONE NUMBER: [id number]

## 2020-08-18 NOTE — PLAN OF CARE
DATE & TIME: 8/18/20 admission-1530  Cognitive Concerns/ Orientation : MIHAI, agitated and angry with staff. Refusing all cares and interventions.   BEHAVIOR & AGGRESSION TOOL COLOR: Green  CIWA SCORE: NA   ABNL VS/O2: Refused vital signs   MOBILITY: SBA d/t cognition   PAIN MANAGMENT: Absence of nonverbal pain indicators   DIET: Mod CHO, CHO counting. Pt refused eating   BOWEL/BLADDER: Pt refusing continence checks  ABNL LAB/BG: Ketone 1.6, refusing BG checks, creat 0.44  DRAIN/DEVICES: R arm PIV SL, refusing LR @100mL/hr  TELEMETRY RHYTHM: NA  SKIN: Refused full skin assessment. Scabbed, maida, bronze, cracked, bruised, dry.  TESTS/PROCEDURES: None scheduled   D/C DAY/GOALS/PLACE: Pending discharge plan   OTHER IMPORTANT INFO: COVID negative, precautions discontinued. See progress note regarding education and refusal of cares. Belongings documented. Will continue to monitor.   MD/RN ROUNDING SIGNED OFF D/E SHIFT: Yes   COMMIT TO SIT DONE AND SIGNED OFF Yes

## 2020-08-18 NOTE — ED TRIAGE NOTES
Pt  Recently discharge from ER this evening back to her homeless hotel.  Staff called as pt seemed to be altered.  Hyperglycemic.

## 2020-08-19 LAB
GLUCOSE BLDC GLUCOMTR-MCNC: 329 MG/DL (ref 70–99)
GLUCOSE BLDC GLUCOMTR-MCNC: 345 MG/DL (ref 70–99)
GLUCOSE BLDC GLUCOMTR-MCNC: 354 MG/DL (ref 70–99)
GLUCOSE BLDC GLUCOMTR-MCNC: 381 MG/DL (ref 70–99)

## 2020-08-19 PROCEDURE — 99207 ZZC MOONLIGHTING INDICATOR: CPT | Performed by: INTERNAL MEDICINE

## 2020-08-19 PROCEDURE — 25800030 ZZH RX IP 258 OP 636: Performed by: INTERNAL MEDICINE

## 2020-08-19 PROCEDURE — 00000146 ZZHCL STATISTIC GLUCOSE BY METER IP

## 2020-08-19 PROCEDURE — 25000132 ZZH RX MED GY IP 250 OP 250 PS 637: Performed by: PSYCHIATRY & NEUROLOGY

## 2020-08-19 PROCEDURE — 99221 1ST HOSP IP/OBS SF/LOW 40: CPT | Performed by: PSYCHIATRY & NEUROLOGY

## 2020-08-19 PROCEDURE — 25000128 H RX IP 250 OP 636: Performed by: HOSPITALIST

## 2020-08-19 PROCEDURE — 99232 SBSQ HOSP IP/OBS MODERATE 35: CPT | Performed by: INTERNAL MEDICINE

## 2020-08-19 PROCEDURE — 12000000 ZZH R&B MED SURG/OB

## 2020-08-19 PROCEDURE — 25000131 ZZH RX MED GY IP 250 OP 636 PS 637: Performed by: INTERNAL MEDICINE

## 2020-08-19 RX ORDER — OLANZAPINE 5 MG/1
5-10 TABLET, ORALLY DISINTEGRATING ORAL EVERY 6 HOURS PRN
Status: DISCONTINUED | OUTPATIENT
Start: 2020-08-19 | End: 2020-08-21 | Stop reason: HOSPADM

## 2020-08-19 RX ORDER — OLANZAPINE 5 MG/1
5 TABLET, ORALLY DISINTEGRATING ORAL 2 TIMES DAILY
Status: DISCONTINUED | OUTPATIENT
Start: 2020-08-19 | End: 2020-08-21 | Stop reason: HOSPADM

## 2020-08-19 RX ORDER — MULTIPLE VITAMINS W/ MINERALS TAB 9MG-400MCG
1 TAB ORAL DAILY
Status: DISCONTINUED | OUTPATIENT
Start: 2020-08-19 | End: 2020-08-21 | Stop reason: HOSPADM

## 2020-08-19 RX ADMIN — INSULIN GLARGINE 10 UNITS: 100 INJECTION, SOLUTION SUBCUTANEOUS at 20:50

## 2020-08-19 RX ADMIN — OLANZAPINE 5 MG: 5 TABLET, ORALLY DISINTEGRATING ORAL at 09:10

## 2020-08-19 RX ADMIN — INSULIN ASPART 8 UNITS: 100 INJECTION, SOLUTION INTRAVENOUS; SUBCUTANEOUS at 13:04

## 2020-08-19 RX ADMIN — SODIUM CHLORIDE, POTASSIUM CHLORIDE, SODIUM LACTATE AND CALCIUM CHLORIDE: 600; 310; 30; 20 INJECTION, SOLUTION INTRAVENOUS at 11:32

## 2020-08-19 RX ADMIN — INSULIN ASPART 10 UNITS: 100 INJECTION, SOLUTION INTRAVENOUS; SUBCUTANEOUS at 17:17

## 2020-08-19 RX ADMIN — INSULIN ASPART 9 UNITS: 100 INJECTION, SOLUTION INTRAVENOUS; SUBCUTANEOUS at 09:10

## 2020-08-19 RX ADMIN — SODIUM CHLORIDE, POTASSIUM CHLORIDE, SODIUM LACTATE AND CALCIUM CHLORIDE: 600; 310; 30; 20 INJECTION, SOLUTION INTRAVENOUS at 01:41

## 2020-08-19 RX ADMIN — CEFTRIAXONE SODIUM 2 G: 2 INJECTION, POWDER, FOR SOLUTION INTRAMUSCULAR; INTRAVENOUS at 11:33

## 2020-08-19 RX ADMIN — SODIUM CHLORIDE, POTASSIUM CHLORIDE, SODIUM LACTATE AND CALCIUM CHLORIDE: 600; 310; 30; 20 INJECTION, SOLUTION INTRAVENOUS at 21:01

## 2020-08-19 RX ADMIN — OLANZAPINE 5 MG: 5 TABLET, ORALLY DISINTEGRATING ORAL at 20:50

## 2020-08-19 ASSESSMENT — ACTIVITIES OF DAILY LIVING (ADL)
ADLS_ACUITY_SCORE: 15

## 2020-08-19 NOTE — CONSULTS
"CLINICAL NUTRITION SERVICES  -  ASSESSMENT NOTE    Future/Additional Recommendations:   - pending adequacy of oral intake may benefit from supplements   - added thera vit m daily    Malnutrition:   % Weight Loss:  > 10% in 6 months (severe malnutrition)  % Intake:  Unable to fully assess - suspect <50% for >1 week  Subcutaneous Fat Loss:  Orbital region moderate depletion and Thoracic region moderate depletion  Muscle Loss:  Temporal region moderate depletion, Clavicle bone region moderate depletion, Acromion bone region moderate depletion and Scapular bone region moderate depletion  Fluid Retention:  None noted    Malnutrition Diagnosis: Severe malnutrition  In Context of:  Chronic illness or disease  Environmental or social circumstances     REASON FOR ASSESSMENT  Maria Guadalupe Betancourt is a 65 year old female seen by Registered Dietitian for Admission Nutrition Risk Screen for reduced oral intake over the last month and new/uncontrolled diabetes    NUTRITION HISTORY  - Information obtained from chart review.   - Pt sleeping during attempt to visit, did not arouse to voice multiple times.    - admitted with septic encephalopathy r/t UTI, acute metabolic encephalopathy, hyperglycemia and type 2 DM (most recent A1C 12.5 - 2/2020). Also with COPD, thrombocytopenia, prior seizure and stroke.   - Comes from a \"[employer] hotel\", noted to be homeless otherwise. This suggests that food access may be a barrier to adequate oral intake and proper diabetic control.     CURRENT NUTRITION ORDERS  Diet Order:     Moderate Consistent Carbohydrate     Current Intake/Tolerance:  25% intake for evening meal yesterday (turkey, mashed potatoes, corn, marleny food cake). 75% intake for morning meal today (eggs, muffin, sausage joe, apple juice)    NUTRITION FOCUSED PHYSICAL ASSESSMENT FOR DIAGNOSING MALNUTRITION)  Yes - visualized while sleeping in room              Observed:    Muscle wasting (refer to documentation in Malnutrition section) " and Subcutaneous fat loss (refer to documentation in Malnutrition section)   - minimal stores of fat/muscle     Obtained from Chart/Interdisciplinary Team:  Documented refusal of BG checks, lab draws, continence checks, skin assessment     Psych assessment done this morning     José Miguel - Nutrition 3; Total 21  Last BM not recorded    ANTHROPOMETRICS  Height: 5'   Weight: 70 lbs 15.81 oz (32.2 kg)  Body mass index is 13.86 kg/m .  Weight Status:  Underweight BMI <18.5  IBW: 45.5 kg  % IBW: 70.7%  Weight History: 9# loss in the past 5 months (11.4%).   Wt Readings from Last 10 Encounters:   08/19/20 32.2 kg (70 lb 15.8 oz)   08/17/20 45.4 kg (100 lb)     Per Care Everywhere  03/26/20 35.8 kg (79 lb)      LABS  Labs reviewed  No results found for: A1C  Recent Labs   Lab 08/19/20  0756 08/18/20  1732 08/17/20  2222   GLC  --   --  398*   * 334*  --        MEDICATIONS  Medications reviewed  HSSI  LR @ 100 mL/hr     ASSESSED NUTRITION NEEDS PER APPROVED PRACTICE GUIDELINES:  Dosing Weight 32.2 kg   Estimated Energy Needs: 5116-3460 kcals (35-40 Kcal/Kg)  Justification: repletion and underweight  Estimated Protein Needs: 48-64 grams protein (1.5-2 g pro/Kg)  Justification: Repletion  Estimated Fluid Needs: 1 mL/kcal  Justification: maintenance    MALNUTRITION:  % Weight Loss:  > 10% in 6 months (severe malnutrition)  % Intake:  Unable to fully assess - suspect <50% for >1 week  Subcutaneous Fat Loss:  Orbital region moderate depletion and Thoracic region moderate depletion  Muscle Loss:  Temporal region moderate depletion, Clavicle bone region moderate depletion, Acromion bone region moderate depletion and Scapular bone region moderate depletion  Fluid Retention:  None noted    Malnutrition Diagnosis: Severe malnutrition  In Context of:  Chronic illness or disease  Environmental or social circumstances    NUTRITION DIAGNOSIS:  Malnutrition related to baseline inadequate oral intake as evidenced by low BMI of <14  kg/m2, e/o minimal stores of fat/muscle       NUTRITION INTERVENTIONS  Recommendations / Nutrition Prescription  Diet per MD - JE  Supplements if needed, pending adequacy of oral intake.   Thera vit M daily       Implementation  Nutrition education: No education needs assessed at this time  Multivitamin/Mineral: thera vit m daily       Nutrition Goals  Intake of at least 50% meals TID on average.       MONITORING AND EVALUATION:  Progress towards goals will be monitored and evaluated per protocol and Practice Guidelines    Prudence Abdalla RD, LD  [facility] [place], [address], 54, [place]   Pager: [phone]  Weekend Pager: [phone]

## 2020-08-19 NOTE — CONSULTS
"Consult Date:  08/19/2020      PSYCHIATRIC CONSULTATION       REASON FOR CONSULTATION:  Decompensated schizophrenia.      REQUESTING PHYSICIAN:  Santiago Gonzalez MD.      IDENTIFYING DATA:  The patient is a 65-year-old -American female who came to the hospital with altered mental status.  She was in the emergency room complaining of dysuria.  She has been staying at a holiday \"[facility] hotel.\"  She had a high blood sugar.  She came back after that first visit looking confused and markedly agitated and got Haldol.  She is currently on station 66.      CHIEF COMPLAINT:  \"I don't want to answer your questions.\"      HISTORY OF PRESENT ILLNESS:  The patient is a 65-year-old -American woman with a known history of probable schizoaffective disorder.  She is largely uncooperative during the visit lying on her left side, making no eye contact.  She told me she did not want to talk to me and she is irritable, but seems to know she is in the hospital, expressing a desire currently to go back to the [facility] hotel and be left alone.  It looks like she sees a provider over at [facility] and has had sporadic followup.  There is a visit back in June and she saw Veronica Knox at that time.  The note reflects that she had been on a plethora of medicines such as Abilify, hydroxyzine, amantadine, Zoloft, trazodone, Ativan, Zyprexa, Ambien, Benadryl, Cogentin and Remeron.  It does not appear to me that she has been compliant and the visit was rather cursory.  The patient was complaining at that time that she could not sleep.  There are a multitude of medical comorbidities listed in that medical record, including a history of cerebrovascular accident, cirrhosis, herpes, diabetes, seizure disorder, etc.  Currently, I am not able to garnish much history from the patient.  She does not appear overtly psychotic and she is not suicidal or homicidal.  She will not answer most of my questions and seems to be " interacting in a somewhat paranoid way.  I do note that she has been homeless and much of this behavior looks quite chronic, from what I can see.  It is noted that she has had recurrent urinary tract infections and does have a UTI at the present time.      PAST PSYCHIATRIC HISTORY:  Limited details.  Probable schizoaffective disorder, bipolar type with multiple medicines prescribed, as described in my history of present illness.  I cannot verify that she has been compliant with any of this.      PAST CHEMICAL DEPENDENCY HISTORY:  No information available, but I do not see clear evidence of this in the report.  She does not have a drug screen available and her alcohol level was negative at the time of admission.      PAST MEDICAL HISTORY:  Per chart review includes countless comorbidities including encephalopathy from UTI this admission, hyperlipidemia, type 2 diabetes, herpes, cirrhosis, seizure disorder, thrombocytopenia, and cerebrovascular accident.      PRIOR TO ADMISSION MEDICATIONS:  Unknown, but multiple psychotropics were listed in the [facility] outpatient psychiatric progress note in 06/2020, noted in my history of present illness.      FAMILY AND SOCIAL HISTORY:  I really do not have much detail; it looks like she has been homeless for quite some time staying in some sort of a [employer].      MOST RECENT VITAL SIGNS:  Blood pressure 151/92, temperature 98.3, pulse 96, respiratory rate 16, oxygen saturation 98%.      MENTAL STATUS EXAMINATION:  Appearance:  The patient is a slender woman lying on her left side huddled under her bed sheets with her eyes closed.  She is a poor historian.  Speech is irritable in tone.  Rate and flow normal.  Use of language appropriate.  Motor exam:  Calm, but somewhat withdrawn.  Affect irritable.  Mood irritable.  Thought process logical, coherent and goal directed.  No loosening of associations, flight of ideas or formal thought disorder.  She does answer questions  briefly.  Thought content is negative for current hallucinations, delusions, suicidal or homicidal ideation.  She has a somewhat paranoid demeanor.  Cognitive exam:  I know the patient is alert and oriented to self in the hospital, but will not answer additional orientation questions.  Insight and judgment chronically poor.      IMPRESSION:  The patient is a 65-year-old woman with probable bipolar-type schizoaffective disorder and multiple medical comorbidities.  She has had countless medical contacts, poor compliance and homelessness.  She likely has a delirium from the urinary tract infection.  I am going to recommend we offer some Zyprexa 5 mg b.i.d.  It is unclear to me if moving her to Psychiatry will accomplish anything; she is currently not an imminent danger to self or others and not expressing thoughts of hurting herself.  It looks like much of this is very, very chronic.      DIAGNOSES:   1.  Delirium secondary to urinary tract infection.   2.  Schizoaffective disorder, bipolar type by history.   3.  Medical noncompliance.   4.  Multiple medical comorbidities.      PLAN:   1.  Offer Zyprexa Zydis 5 mg b.i.d., p.r.n. doses of IM and oral Zyprexa are available.   2.  Reconsult Psychiatry in the next day or so to evaluate her status.   3.  Currently, if the patient demands to leave and seems ambulatory, I do not see a basis to put her on a hold given the chronicity of her situation.  Currently, she expressed that when she was feeling better she wanted to discharge and had no interest in moving to Psychiatry.         MICHELLE LAWS MD             D: 2020   T: 2020   MT: DEAN      Name:     HERON TANNER   MRN:      [id number]        Account:       [id number]   :      1955           Consult Date:  2020      Document: [id number]

## 2020-08-19 NOTE — PROGRESS NOTES
SW:  D:  Writer received a message for a Abby thru [facility] wanting to speak about guardianship for patient.  Psychiatary consult by Dr Hearn noted  He notes patient's cognitive status was difficult to assess as patient will not answer questions.    Plan:  Writer has called Abby and left a message for a return call.  Will speak with Abby and document her relationship to patient and data supporting her request of consideration of guardianship.

## 2020-08-19 NOTE — PLAN OF CARE
DATE & TIME: 8/19/20 1900-0730  Cognitive Concerns/ Orientation : MIHAI, hostile with staff for cares only. Refusing all cares and interventions.   BEHAVIOR & AGGRESSION TOOL COLOR: yellow, hostile with cares  CIWA SCORE: NA   ABNL VS/O2: Refused vital signs   MOBILITY: SBA d/t cognition   PAIN MANAGMENT: Appears comfortable  DIET: Mod CHO, CHO counting.   BOWEL/BLADDER: Pt refusing continence checks  ABNL LAB/BG: Ketone 1.6, refusing BG checks, creat 0.44  DRAIN/DEVICES: R arm PIV infusing with LR at 100 ml/hr  TELEMETRY RHYTHM: NA  SKIN: Refused full skin assessment. Scabbed, maida, bronze, cracked, bruised, dry.  TESTS/PROCEDURES: None scheduled   D/C DAY/GOALS/PLACE: Pending progress   OTHER IMPORTANT INFO:      0600 bed alarm left off, pt refusing, getting more agitated with bed alarm, pt seems to be steady on feet when ambulating, was SBA due to cognition. Educated pt about fall risk.

## 2020-08-19 NOTE — PROGRESS NOTES
[facility]    Hospitalist Progress Note    Interval History   - Seen by psych, offered zyprexa. Say NO hold if she wants to leave.     Assessment & Plan   Summary: Maria Guadalupe Betancourt is a 65 year old female with PMH chronic paranoid schizoaffective disorder, Type 2 Diabetes mellitus causing retinopathy, and reportedly cirrhosis, Hepatitis C, and COPD, as well as prior seizure and stroke, who was admitted 8/17/2020 with UTI, encephalopathy, and hyperlgycemia.    E coli UTI  Patient is homeless, presented with dysuria. Noted fever to 100.8F here, no leukocytosis. UA growing >100k E coli.  - Continue Ceftriaxone  - Continue LR @ 100ml/hr    Possible psychiatric decompensation  Acute toxic versus metabolic encephalopathy  Patient reportedly declined most cares on presentation in the ED. Somnolent overnight after receiving 5 mg Haldol in the ED for spitting, kicking and screaming. Currently very little interaction.  - Appreciate Psychiatry consult - Offer Zyprexa Zydis 5 mg b.i.d., p.r.n. doses of IM and oral Zyprexa are available. Reconsult psych.      Hyperglycemia  Type 2 Diabetes mellitus  Likely uncontrolled. Most recent A1c 15.2 in 2/2020. Prescribed Glargine, Aspart, and Metformin as an outpatient. Presented with hyperglycemia and elevated Ketones but without metabolic acidosis. Elevated Ketones could be due to starvation/dehydration.  - Will start on Lantus 20 units daily  - Continue sliding scale insulin     Thrombocytopenia  Hx ccirrhosis  Hx Hep C   Thrombocytopenia is mild here.      Prior seizure and stroke: Reportedly; I do not have further details of this history at present    DVT Prophylaxis: Pneumatic Compression Devices  Code Status: Full Code  PT/OT: ordered  Diet: Combination Diet Regular Diet Adult; 8403-9994 Calories: Moderate Consistent CHO (4-6 CHO units/meal)      Disposition: Expected discharge in 2-3 days    Paige Denny MD  Text Page  (7am to 6pm)  -Data reviewed today: I  reviewed all new labs and imaging results over the last 24 hours.    Physical Exam   Temp: 97.9  F (36.6  C) Temp src: Axillary BP: (!) 160/91 Pulse: 84 RETIRE: Heart Rate: 85 Resp: 16 SpO2: 97 % O2 Device: None (Room air)    Vitals:    08/19/20 0519   Weight: 32.2 kg (70 lb 15.8 oz)     Vital Signs with Ranges  Temp:  [97.9  F (36.6  C)-98.3  F (36.8  C)] 97.9  F (36.6  C)  Pulse:  [84] 84  RETIRE: Heart Rate:  [85] 85  Resp:  [16-17] 16  BP: (151-160)/(91-92) 160/91  SpO2:  [97 %-98 %] 97 %  I/O last 3 completed shifts:  In: 240 [P.O.:240]  Out: -   O2 requirements: none    Constitutional: Thin, frail, female, sleeping, minimally interactive  Respiratory: CTAB poor erffort  Vascular: No LE pitting edema  Skin/Integumen: No rashes noted  Neuro/Psych: Minimally interactive    Patient declined cardiovascular and GI exam    Medications     lactated ringers 100 mL/hr at 08/19/20 1132       cefTRIAXone  2 g Intravenous Q24H     insulin aspart  1-10 Units Subcutaneous TID AC     insulin aspart  1-7 Units Subcutaneous At Bedtime     multivitamin w/minerals  1 tablet Oral Daily     nicotine  1 patch Transdermal Daily     nicotine   Transdermal Q8H     OLANZapine zydis  5 mg Oral BID     sodium chloride (PF)  3 mL Intracatheter Q8H       Data   Recent Labs   Lab 08/17/20  2222   WBC 6.6   HGB 13.9   MCV 86   *      POTASSIUM 3.9   CHLORIDE 105   CO2 22   BUN 15   CR 0.44*   ANIONGAP 8   LAKESHA 9.1   *   ALBUMIN 3.3*   PROTTOTAL 8.2   BILITOTAL 0.6   ALKPHOS 90   ALT 20   AST 14       Imaging:   No results found for this or any previous visit (from the past 24 hour(s)).

## 2020-08-19 NOTE — PLAN OF CARE
DATE & TIME: 8/19/20 3351-5252  Cognitive Concerns/ Orientation : MIHAI, hostile with staff for cares only; refuses cares at times but has been agreeing to take her meds, BG and insulin this shift  BEHAVIOR & AGGRESSION TOOL COLOR: yellow, hostile with cares  CIWA SCORE: NA   ABNL VS/O2: BP elevated at times; otherwise, VSS  MOBILITY: SBA but refuses alarm so has been independent in room (educated about fall risk)  PAIN MANAGMENT: Denies  DIET: Mod CHO, CHO counting.   BOWEL/BLADDER: Incontinent at times  ABNL LAB/BG: , 329, 381; refused lab draws  DRAIN/DEVICES: R arm PIV infusing with LR at 100 ml/hr  TELEMETRY RHYTHM: NA  SKIN: Refused full skin assessment. Scabbed, maida, bronze, cracked, bruised, dry.  TESTS/PROCEDURES: None scheduled   D/C DAY/GOALS/PLACE: Expected discharge in 2-3 days   OTHER IMPORTANT INFO:  Psych following - started on Zyprexa; not holdable if patient wants to leave.

## 2020-08-19 NOTE — PROVIDER NOTIFICATION
MD Notification    Notified Person: MD    Notified Person Name: Dr. Arce    Notification Date/Time: 8/18/2020 1117    Notification Interaction:    Purpose of Notification: Pt aggressive to staff, unable to do cares/blood sugars, please advice on level of care.    Orders Received: ok to hold off on BG checks/cares if pt refusing.     Comments:

## 2020-08-20 LAB
GLUCOSE BLDC GLUCOMTR-MCNC: 198 MG/DL (ref 70–99)
GLUCOSE BLDC GLUCOMTR-MCNC: 201 MG/DL (ref 70–99)
GLUCOSE BLDC GLUCOMTR-MCNC: 375 MG/DL (ref 70–99)
GLUCOSE BLDC GLUCOMTR-MCNC: 397 MG/DL (ref 70–99)
GLUCOSE BLDC GLUCOMTR-MCNC: 477 MG/DL (ref 70–99)
GLUCOSE BLDC GLUCOMTR-MCNC: 555 MG/DL (ref 70–99)
GLUCOSE BLDC GLUCOMTR-MCNC: >600 MG/DL (ref 70–99)

## 2020-08-20 PROCEDURE — 25000132 ZZH RX MED GY IP 250 OP 250 PS 637: Performed by: PSYCHIATRY & NEUROLOGY

## 2020-08-20 PROCEDURE — 25000132 ZZH RX MED GY IP 250 OP 250 PS 637: Performed by: INTERNAL MEDICINE

## 2020-08-20 PROCEDURE — 25000128 H RX IP 250 OP 636: Performed by: HOSPITALIST

## 2020-08-20 PROCEDURE — 99232 SBSQ HOSP IP/OBS MODERATE 35: CPT | Performed by: PSYCHIATRY & NEUROLOGY

## 2020-08-20 PROCEDURE — 99232 SBSQ HOSP IP/OBS MODERATE 35: CPT | Performed by: STUDENT IN AN ORGANIZED HEALTH CARE EDUCATION/TRAINING PROGRAM

## 2020-08-20 PROCEDURE — 25800030 ZZH RX IP 258 OP 636: Performed by: INTERNAL MEDICINE

## 2020-08-20 PROCEDURE — 25000131 ZZH RX MED GY IP 250 OP 636 PS 637: Performed by: INTERNAL MEDICINE

## 2020-08-20 PROCEDURE — 12000000 ZZH R&B MED SURG/OB

## 2020-08-20 PROCEDURE — 00000146 ZZHCL STATISTIC GLUCOSE BY METER IP

## 2020-08-20 RX ORDER — SODIUM CHLORIDE, SODIUM LACTATE, POTASSIUM CHLORIDE, CALCIUM CHLORIDE 600; 310; 30; 20 MG/100ML; MG/100ML; MG/100ML; MG/100ML
INJECTION, SOLUTION INTRAVENOUS CONTINUOUS
Status: ACTIVE | OUTPATIENT
Start: 2020-08-20 | End: 2020-08-20

## 2020-08-20 RX ADMIN — INSULIN ASPART 3 UNITS: 100 INJECTION, SOLUTION INTRAVENOUS; SUBCUTANEOUS at 10:29

## 2020-08-20 RX ADMIN — SODIUM CHLORIDE, POTASSIUM CHLORIDE, SODIUM LACTATE AND CALCIUM CHLORIDE: 600; 310; 30; 20 INJECTION, SOLUTION INTRAVENOUS at 06:26

## 2020-08-20 RX ADMIN — OLANZAPINE 5 MG: 5 TABLET, ORALLY DISINTEGRATING ORAL at 10:30

## 2020-08-20 RX ADMIN — INSULIN GLARGINE 20 UNITS: 100 INJECTION, SOLUTION SUBCUTANEOUS at 23:15

## 2020-08-20 RX ADMIN — MULTIPLE VITAMINS W/ MINERALS TAB 1 TABLET: TAB at 10:30

## 2020-08-20 RX ADMIN — INSULIN ASPART 10 UNITS: 100 INJECTION, SOLUTION INTRAVENOUS; SUBCUTANEOUS at 13:12

## 2020-08-20 RX ADMIN — CEFTRIAXONE SODIUM 2 G: 2 INJECTION, POWDER, FOR SOLUTION INTRAMUSCULAR; INTRAVENOUS at 12:04

## 2020-08-20 RX ADMIN — INSULIN ASPART 10 UNITS: 100 INJECTION, SOLUTION INTRAVENOUS; SUBCUTANEOUS at 17:07

## 2020-08-20 ASSESSMENT — ACTIVITIES OF DAILY LIVING (ADL)
ADLS_ACUITY_SCORE: 15

## 2020-08-20 NOTE — PROVIDER NOTIFICATION
MD Notification    Notified Person: MD    Notified Person Name: Dr. Gaviria    Notification Date/Time: 0211 8/20/2020    Notification Interaction:    Purpose of Notification:     Orders Received: 10 novolog, recheck 4:30 am    Comments:

## 2020-08-20 NOTE — PROVIDER NOTIFICATION
MD Notification    Notified Person: MD    Notified Person Name: Jessica    Notification Date/Time: 8/20/20 5 pm    Notification Interaction: Page    Purpose of Notification: Pt's . Will give 10 units of insulin per order. Want more coverage?    Orders Received: 5 units of insulin ordered    Comments:

## 2020-08-20 NOTE — PROVIDER NOTIFICATION
MD Notification    Notified Person: MD    Notified Person Name: Dr. Lopez     Notification Date/Time: 8/20/2020 1400    Notification Interaction: paged provider     Purpose of Notification: FYI, pt  at lunch, 10 units given but pt refusing recheck of BG. Pt also refused am labs.     Orders Received:    Comments:

## 2020-08-20 NOTE — PROVIDER NOTIFICATION
Brief update:    Page regarding blood glucose of 466    Last ate at approximately midnight.  Has intermittently been refusing cares.  Persistently elevated blood glucose    10 units NovoLog x1  Recheck point-of-care blood glucose approximately 4:30 AM    Patient would likely benefit from prandial insulin dosing.  Will defer to rounding provider.      Joseph Gaviria MD  2:46 AM

## 2020-08-20 NOTE — PLAN OF CARE
DATE & TIME: 8/20/20 1900-0730  Cognitive Concerns/ Orientation : MIHAI, hostile with staff for cares only; agreed to take medications and do vitals and BG checks this shift.  BEHAVIOR & AGGRESSION TOOL COLOR: green-yellow, hostile with cares at times  CIWA SCORE: NA   ABNL VS/O2: VSS except htn  MOBILITY: SBA but refuses alarm so has been independent in room (educated about fall risk)  PAIN MANAGMENT: Denies  DIET: Mod CHO, CHO counting.   BOWEL/BLADDER: Incontinent at times  ABNL LAB/BG: /477/201. refused lab draw yesterday  DRAIN/DEVICES: R arm PIV infusing with LR at 100 ml/hr  TELEMETRY RHYTHM: NA  SKIN: Refused full skin assessment. Scabbed, maida, bronze, cracked, bruised, dry.  TESTS/PROCEDURES: None scheduled   D/C DAY/GOALS/PLACE:2-3 days pending progress.  OTHER IMPORTANT INFO:

## 2020-08-20 NOTE — CONSULTS
Patient seen for follow-up psychiatric consultation. Please see my note for details. Thanks.    Bill Jarrell MD

## 2020-08-20 NOTE — PROVIDER NOTIFICATION
Page Dr. Lopez. JUDAH, Pt refusing to let me hang another bag of IVF. Says she will rip out her IV if I do.

## 2020-08-20 NOTE — PROGRESS NOTES
[facility]    Hospitalist Progress Note    Interval History      - No fevers or chills today  - No nausea/vomiting or abdominal pain  - No new complaints.   - Seen by psych, offered zyprexa. Say NO hold if she wants to leave.     Assessment & Plan      Summary: Maria Guadalupe Betancourt is a 65 year old female with PMH chronic paranoid schizoaffective disorder, Type 2 Diabetes mellitus causing retinopathy, and reportedly cirrhosis, Hepatitis C, and COPD, as well as prior seizure and stroke, who was admitted 8/17/2020 with UTI, encephalopathy, and hyperlgycemia.    E coli UTI  Patient is homeless, presented with dysuria. Noted fever to 100.8F here, no leukocytosis. UA growing >100k E coli, pansensitive.   Plan:  - Continue Ceftriaxone -> PO tomorrow.   - Continue LR @ 100ml/hr, stop tonight    Possible psychiatric decompensation  Acute toxic versus metabolic encephalopathy  Patient reportedly declined most cares on presentation in the ED. Somnolent overnight after receiving 5 mg Haldol in the ED for spitting, kicking and screaming. Currently very little interaction.  - Appreciate Psychiatry consult - Offer Zyprexa Zydis 5 mg b.i.d., p.r.n. doses of IM and oral Zyprexa are available.     Hyperglycemia  Type 2 Diabetes mellitus  Likely uncontrolled. Most recent A1c 15.2 in 2/2020. Prescribed Glargine, Aspart, and Metformin as an outpatient. Presented with hyperglycemia and elevated Ketones but without metabolic acidosis. Elevated Ketones could be due to starvation/dehydration. BG still largely uncontrolled.   Plan:  - Continue on Lantus, increased to 15 U units daily  - Continue sliding scale insulin     Thrombocytopenia  Hx ccirrhosis  Hx Hep C   Thrombocytopenia is mild here.      Prior seizure and stroke: Reportedly; I do not have further details of this history at present    DVT Prophylaxis: Pneumatic Compression Devices  Code Status: Full Code  PT/OT: ordered  Diet: Combination Diet Regular Diet Adult;  2225-4311 Calories: Moderate Consistent CHO (4-6 CHO units/meal)      Disposition: Expected discharge tomorrow once disposition finalized.     Abhilash Lopez MD  Text Page  (7am to 6pm)  -Data reviewed today: I reviewed all new labs and imaging results over the last 24 hours.    Physical Exam   Temp: 98.5  F (36.9  C) Temp src: Oral BP: (!) 157/92 Pulse: 89   Resp: 16 SpO2: 95 % O2 Device: None (Room air)    Vitals:    08/19/20 0519 08/20/20 0637   Weight: 32.2 kg (70 lb 15.8 oz) 32.2 kg (70 lb 15.8 oz)     Vital Signs with Ranges  Temp:  [98.3  F (36.8  C)-98.5  F (36.9  C)] 98.5  F (36.9  C)  Pulse:  [89-91] 89  Resp:  [16] 16  BP: (157-173)/(92-98) 157/92  SpO2:  [95 %] 95 %  I/O last 3 completed shifts:  In: 2356 [I.V.:2356]  Out: -   O2 requirements: none    Constitutional: Thin, frail, female, sleeping, minimally interactive  Respiratory: CTAB poor erffort  Vascular: No LE pitting edema  CV: RRR with no m/r/g  Skin/Integumen: No rashes noted  Neuro/Psych: Minimally interactive    Medications     lactated ringers 100 mL/hr at 08/20/20 0626       cefTRIAXone  2 g Intravenous Q24H     insulin aspart  1-10 Units Subcutaneous TID AC     insulin aspart  1-7 Units Subcutaneous At Bedtime     insulin glargine  15 Units Subcutaneous At Bedtime     multivitamin w/minerals  1 tablet Oral Daily     nicotine  1 patch Transdermal Daily     nicotine   Transdermal Q8H     OLANZapine zydis  5 mg Oral BID     sodium chloride (PF)  3 mL Intracatheter Q8H       Data   Recent Labs   Lab 08/17/20  2222   WBC 6.6   HGB 13.9   MCV 86   *      POTASSIUM 3.9   CHLORIDE 105   CO2 22   BUN 15   CR 0.44*   ANIONGAP 8   LAKESHA 9.1   *   ALBUMIN 3.3*   PROTTOTAL 8.2   BILITOTAL 0.6   ALKPHOS 90   ALT 20   AST 14       Imaging:   No results found for this or any previous visit (from the past 24 hour(s)).

## 2020-08-20 NOTE — CONSULTS
[facility] Psychiatric Consult Progress Note      Interval History:   Pt seen, care reviewed with treatment team.  The preceding clinical notes were reviewed and appreciated.  Staff reported the patient has been less than cooperative with her care.  Following introduction to the patient by the undersigned, she reported that she has a psychiatrist she sees at [facility] and did not see any reason to be under the care of a different psychiatrist while in the hospital.  The concerns expressed by her treatment team were explained to her and she dismissed this with a wave of the hand saying that she would not be answering any further questions.  I spoke with the unit , who reports that she had received a message from a case management company that reported that they were interested in pursuing guardianship on the patient but they reportedly have not called her back.  Patient denies experiencing any self-harm thoughts plans or intent.  She does not display any overt psychosis but she is very irritable and uncooperative.   Review of systems:   The Review of Systems is negative other than noted in the HPI     Medications:       cefTRIAXone  2 g Intravenous Q24H     insulin aspart  1-10 Units Subcutaneous TID AC     insulin aspart  1-7 Units Subcutaneous At Bedtime     insulin glargine  10 Units Subcutaneous At Bedtime     multivitamin w/minerals  1 tablet Oral Daily     nicotine  1 patch Transdermal Daily     nicotine   Transdermal Q8H     OLANZapine zydis  5 mg Oral BID     sodium chloride (PF)  3 mL Intracatheter Q8H     acetaminophen, acetaminophen, bisacodyl, glucose **OR** dextrose **OR** glucagon, lidocaine 4%, lidocaine (buffered or not buffered), melatonin, naloxone, OLANZapine zydis, ondansetron **OR** ondansetron, polyethylene glycol, senna-docusate **OR** senna-docusate, sodium chloride (PF)      Mental Status Examination:     This is a middle-aged woman who appears older than her stated age  of 65.  She is dressed in hospital garb and is currently receiving IV fluids.  She is alert and watching TV.  She responds to queries her mood is irritable with a hostile affect.  Thought processes mostly logical and relevant but she is somewhat paranoid as to the intentions of the undersigned.  She is oriented to person and place but would not answer any orientation questions and declined to respond to queries regarding presence or absence of psychosis.  The rest of this MSE was deferred on account of her lack of cooperation.      Labs/vitals:     Recent Results (from the past 24 hour(s))   Glucose by meter    Collection Time: 08/19/20  4:48 PM   Result Value Ref Range    Glucose 381 (H) 70 - 99 mg/dL   Glucose by meter    Collection Time: 08/19/20  8:45 PM   Result Value Ref Range    Glucose 345 (H) 70 - 99 mg/dL   Glucose by meter    Collection Time: 08/20/20  2:02 AM   Result Value Ref Range    Glucose 477 (H) 70 - 99 mg/dL   Glucose by meter    Collection Time: 08/20/20  4:49 AM   Result Value Ref Range    Glucose 201 (H) 70 - 99 mg/dL   Glucose by meter    Collection Time: 08/20/20  8:11 AM   Result Value Ref Range    Glucose 198 (H) 70 - 99 mg/dL   Glucose by meter    Collection Time: 08/20/20 12:51 PM   Result Value Ref Range    Glucose 397 (H) 70 - 99 mg/dL     B/P: 157/92, T: 98.5, P: 89, R: 16    Impression:   Maria Guadalupe Betancourt is a 65 year old year old female with probable bipolar-type schizoaffective disorder and multiple medical comorbidities.  She has had countless medical contacts, poor compliance and homelessness.  She likely has a delirium from the urinary tract infection. She has been refusing labs and cares.  She did not cooperate with my assessment today.   reports that      DIagnoses:   1.  Delirium secondary to urinary tract infection - improving.   2.  Schizoaffective disorder, bipolar type by history.   3.  Medical noncompliance.   4.  Multiple medical comorbidities.         Plan:   1. Written information given on medications. Side effects, risks, benefits reviewed.  2. Continue current care and discharge back to her hotel to f/u  with community providers.    Attestation:  Patient has been seen and evaluated by me,  Bill Jarrell MD

## 2020-08-20 NOTE — PLAN OF CARE
Cognitive Concerns/ Orientation : Alert, MIHAI orientation, pt refuses to answer questions. Frustrated with cares, yelling/refusing cares at times, calm in between cares.   BEHAVIOR & AGGRESSION TOOL COLOR: green-yellow, agitated with cares at times  CIWA SCORE: NA   ABNL VS/O2: /92, otherwise VSS on room air  MOBILITY: SBA but refuses alarm so has been independent in room (educated about fall risk)  PAIN MANAGMENT: Denies  DIET: Mod carb, tolerating, good appetite.   BOWEL/BLADDER: Incontinent at times  ABNL LAB/BG: , 397 - refused recheck. Refused am labs. MD aware.   DRAIN/DEVICES: PIV, IVF infusing   TELEMETRY RHYTHM: n/a  SKIN: Refused full skin assessment. Scabbed, maida, bronze, cracked, bruised, dry.  TESTS/PROCEDURES: n/a  D/C DAY/GOALS/PLACE: pending   OTHER IMPORTANT INFO:  Agreeable to simple assessment this morning, agitated with blood sugar checks especially. Agreeable to take medications (except refused nicotine patch) and insulin. Seen by psych yesterday, reconsulted for today.

## 2020-08-21 VITALS
SYSTOLIC BLOOD PRESSURE: 155 MMHG | HEART RATE: 93 BPM | BODY MASS INDEX: 11.11 KG/M2 | TEMPERATURE: 98.5 F | RESPIRATION RATE: 18 BRPM | DIASTOLIC BLOOD PRESSURE: 89 MMHG | OXYGEN SATURATION: 100 % | WEIGHT: 56.9 LBS

## 2020-08-21 LAB
GLUCOSE BLDC GLUCOMTR-MCNC: 286 MG/DL (ref 70–99)
GLUCOSE BLDC GLUCOMTR-MCNC: 304 MG/DL (ref 70–99)
GLUCOSE BLDC GLUCOMTR-MCNC: 338 MG/DL (ref 70–99)
GLUCOSE BLDC GLUCOMTR-MCNC: 353 MG/DL (ref 70–99)

## 2020-08-21 PROCEDURE — 00000146 ZZHCL STATISTIC GLUCOSE BY METER IP

## 2020-08-21 PROCEDURE — 99239 HOSP IP/OBS DSCHRG MGMT >30: CPT | Performed by: STUDENT IN AN ORGANIZED HEALTH CARE EDUCATION/TRAINING PROGRAM

## 2020-08-21 PROCEDURE — 25000132 ZZH RX MED GY IP 250 OP 250 PS 637: Performed by: PSYCHIATRY & NEUROLOGY

## 2020-08-21 RX ORDER — CEFUROXIME AXETIL 500 MG/1
500 TABLET ORAL 2 TIMES DAILY
Qty: 10 TABLET | Refills: 0 | Status: SHIPPED | OUTPATIENT
Start: 2020-08-21 | End: 2020-08-26

## 2020-08-21 RX ORDER — OLANZAPINE 5 MG/1
5 TABLET, ORALLY DISINTEGRATING ORAL 2 TIMES DAILY
Qty: 30 TABLET | Refills: 0 | Status: ON HOLD | OUTPATIENT
Start: 2020-08-21 | End: 2022-01-01

## 2020-08-21 RX ORDER — GLIPIZIDE 2.5 MG/1
2.5 TABLET, EXTENDED RELEASE ORAL DAILY
Qty: 15 TABLET | Refills: 0 | Status: SHIPPED | OUTPATIENT
Start: 2020-08-21 | End: 2020-08-21

## 2020-08-21 RX ADMIN — OLANZAPINE 5 MG: 5 TABLET, ORALLY DISINTEGRATING ORAL at 09:04

## 2020-08-21 RX ADMIN — INSULIN ASPART 8 UNITS: 100 INJECTION, SOLUTION INTRAVENOUS; SUBCUTANEOUS at 13:14

## 2020-08-21 RX ADMIN — INSULIN ASPART 9 UNITS: 100 INJECTION, SOLUTION INTRAVENOUS; SUBCUTANEOUS at 09:03

## 2020-08-21 ASSESSMENT — ACTIVITIES OF DAILY LIVING (ADL)
ADLS_ACUITY_SCORE: 15

## 2020-08-21 NOTE — PROGRESS NOTES
On Call    Glucometer check > 600    Give Novolog 10 units subcutaneous now  Increase Lantus to 20 units subcutaneous at bedtime (give now)  Check glucose in 1 hour call with update    Scott Weston MD  Pager: [phone]  Cell Phone:  [phone]

## 2020-08-21 NOTE — PLAN OF CARE
DATE & TIME: 8/21/20   Cognitive Concerns/ Orientation : Alert, MIHAI orientation, pt refuses to answer questions.  Patient states no and refuses assessment.  Did allow NA to get blood glucose and do vitals.    BEHAVIOR & AGGRESSION TOOL COLOR: Green, yellow at times, agitated with cares.   CIWA SCORE: NA   ABNL VS/O2: VSS except HTN  MOBILITY: SBA but refuses alarm so has been independent in room   PAIN MANAGMENT: Denies  DIET: Mod CHO  BOWEL/BLADDER: Incontinent at times, up to BSC  ABNL LAB/BG:  at 0200.   DRAIN/DEVICES: PIV SL, fluids discontinued.   TELEMETRY RHYTHM:NA  SKIN: Refused full skin assessment. Scabbed, maida, cracked, bruised, dry.  TESTS/PROCEDURES: n/a  D/C DAY/GOALS/PLACE: Possibly tomorrow once disposition finalized.   OTHER IMPORTANT INFO:

## 2020-08-21 NOTE — PROGRESS NOTES
SW:  D:  Writer reached patient's CADI worker today by phone, Jen thru [facility].[phone].  She reports she has had difficulty in speaking with patient.  She reports the home care RN Myesha [facility]remerson [facility] is concerned because patient is non compliant with medications. Under her service plan, patient receives [employer] delivered every two weeks, she as an ILS worker but cancels her visits, and she has a  but patient doesn't want to pay for fee's for housing applications.  When the specialist is with patient driving to different housing programs, patient gets angry with the discussion of pay fee's and has left the care.   Jen asks about how to start a guardianship petition.  Writer reviewed psychiatry impressions that the acute delirium was likely caused by a UTI and shared that patients A1C was 15 which does confirm poor diabetic management.  Explained to Abby that the medical providers will need to feel patient's life is in danger because of her mental health or incompetence in managing her health.  Two psychiatrists have examined patient here and did not indicate patient meets the threshold for guardianship or holding for a commitment.  In reviewing her EMR, she has been to the [facility] ED 6/6/20, 6/19,6/28 and 8/17/20.  All of the June ED visits were related to hyperglycemia.    A:  The question becomes, does patient not comply with medication because of cognitive impairment vs her mental illness interferring with compliance vs denial of diabetes and and need for medication.    P: Anticipate discharge  back to the [facility] with a referral to resume her home RN visits for medication management. [facility] is her agency, Micaela contact -  [phone], fax [phone]  The CADI worker plans to reach out to patient weekly in the hopes of working with patient.     Update:  Reviewed patient's history with Dr Lopez.  He indicates he does believe if patient  returns again with non compliance medical complications, psychiatry needs to be consulted again.  Today patient is discharging back to [facility] at [address] [place] [address] in [place].  Writer called the  at the hotel and they confirm patient has a room there.  Writer spoke with Ame at [facility] and faxed orders to resume RN services.  Spoke with patient's  and updated her. Also faxed her records for continuity.  Arranged a taxi ride for patient thru [facility].  The taxi service is [employer] and they should arrive at [address] at approximately 5:20pm.  They are to call #[phone] as they are arriving.  If there are any problems, their phone number is [phone].

## 2020-08-21 NOTE — PLAN OF CARE
DATE & TIME: 8/20/20 3282-6170  Cognitive Concerns/ Orientation : Alert, MIHAI orientation, pt refuses to answer questions. Frustrated with cares, yelling/refusing cares at times, calm in between cares.   BEHAVIOR & AGGRESSION TOOL COLOR: Green, yellow at times, agitated with cares.   CIWA SCORE: NA   ABNL VS/O2: Pt refused vitals this evening  MOBILITY: SBA but refuses alarm so has been independent in room   PAIN MANAGMENT: Denies  DIET: Mod CHO  BOWEL/BLADDER: Incontinent at times, up to BSC  ABNL LAB/BG: , 10 units of insulin given and 5 extra units of coverage given. BG >600, 7 units of bedtime dose given, recheck an hour later was 375 5 extra units ordered. Sometimes refuses BG checks. Refused AM labs. MD aware.   DRAIN/DEVICES: PIV SL, supposed to be infusing  ml/hr. Pt refused another bag to be hung, saying she will rip out her IV, MD paged.   TELEMETRY RHYTHM: n/a  SKIN: Refused full skin assessment. Scabbed, maida, cracked, bruised, dry.  TESTS/PROCEDURES: n/a  D/C DAY/GOALS/PLACE: Possibly tomorrow once disposition finalized.   OTHER IMPORTANT INFO:  Agitated,, refused vitals and assessment. Agreeable to insulin, refused oral meds.

## 2020-08-21 NOTE — PROVIDER NOTIFICATION
Brief update:    Page regarding blood glucose of 375  Was in the 600 range earlier. Note increase in lantus and 10 unit novolog dose earlier    Give 5 novolog now. Recheck BG at 2 AM    Joseph Gaviria MD  11:05 PM

## 2020-08-21 NOTE — DISCHARGE INSTRUCTIONS
Resumption of home RN for medication management  thru [facility]   [phone].

## 2020-08-21 NOTE — PROVIDER NOTIFICATION
MD Notification    Notified Person: MD    Notified Person Name: Gaviria    Notification Date/Time: 8/20/20 10:40 pm    Notification Interaction: Page    Purpose of Notification: Recheck , earlier was 600. Want extra insulin coverage?    Orders Received: 5 units ordered    Comments:

## 2020-08-21 NOTE — PLAN OF CARE
Discharge    Patient discharged to [facility] via [facility]. Home care RN set up for medication management by JOSE MIGUEL.   Care plan note:   Cognitive Concerns/ Orientation: Alert, MIHAI orientation, pt refuses to answer questions.  Uncooperative with cares at times, can yell and refuse cares. Especially agitated with blood glucose checks.  BEHAVIOR & AGGRESSION TOOL COLOR: Green, yellow at times (yelling at staff, refusing cares)   CIWA SCORE: n/a  ABNL VS/O2: Elevated /89, other VSS on room air  MOBILITY: SBA but refuses alarm so has been independent in room.   PAIN MANAGMENT: Denies  DIET: Mod carb  BOWEL/BLADDER: Continent, using BSC  ABNL LAB/BG: , 304, and 338 during day shift.   DRAIN/DEVICES: PIV removed during day shift    TELEMETRY RHYTHM: n/a  SKIN: Scabbed, maida, cracked, bruised, dry. Refuses full skin assessment.   TESTS/PROCEDURES: n/a  D/C DAY/GOALS/PLACE: Today (Friday)   OTHER IMPORTANT INFO:  Agreeable to some meds and insulin, refused multivitamin and nicotine patch this.    Tried to educate on diabetes management and insulin administration. 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 Sent Pt home with education sheets on Diabetes and insulin.      Listed belongings gathered and returned to patient. Yes  Care Plan and Patient education resolved: Yes  Prescriptions if needed, hard copies sent with patient  Yes  Home and hospital acquired medications returned to patient: NA  Medication Bin checked and emptied on discharge Yes  Follow up appointment made for patient: No, following up with RN for meds. Will make own Primary care followup appointment.

## 2020-08-21 NOTE — PLAN OF CARE
Cognitive Concerns/ Orientation : Alert, MIHAI orientation, pt refuses to answer questions.  Uncooperative with cares at times, can yell and refuse cares. Especially agitated with blood glucose checks.  BEHAVIOR & AGGRESSION TOOL COLOR: Green, yellow at times (yelling at staff, refusing cares)   CIWA SCORE: n/a  ABNL VS/O2: VSS on room air  MOBILITY: SBA but refuses alarm so has been independent in room   PAIN MANAGMENT: Denies  DIET: Mod carb, tolerating. Asks for snack frequently, have been attempting to limit snacks due to high blood sugars, which further upsets her, so have been compromising with patient.   BOWEL/BLADDER: Continent, using BSC  ABNL LAB/BG: , 10 units given, recheck 304; 338 at lunchtime.  DRAIN/DEVICES: PIV, saline locked    TELEMETRY RHYTHM: n/a  SKIN: Scabbed, maida, cracked, bruised, dry.  TESTS/PROCEDURES: n/a  D/C DAY/GOALS/PLACE: Later this afternoon, back to hotel/shelter    OTHER IMPORTANT INFO:  Agreeable to some meds and insulin, refused multivitamin and nicotine patch.

## 2020-08-21 NOTE — PROVIDER NOTIFICATION
MD Notification    Notified Person: MD    Notified Person Name: Juan Pablo    Notification Date/Time: 8/20/20 9 pm    Notification Interaction: Page    Purpose of Notification: Pt's BG >600    Orders Received: Give 10 units now and increased Lantus to 20 units, Recheck in an hour.     Comments:    Paged [facility] again, bedtime insulin order was only 1-7 units, so only got 7 units not 10.

## 2020-08-21 NOTE — DISCHARGE SUMMARY
[facility]  Hospitalist Discharge Summary      Date of Admission:  8/17/2020  Date of Discharge:  8/21/2020  Discharging Provider: Abhilash Lopez MD      Discharge Diagnoses     Hyperglycemia  UTI  Septic Encephalopathy, most likely due to UTI.      Follow-ups Needed After Discharge   Follow-up Appointments     Follow-up and recommended labs and tests       Follow up with primary care provider, Physician No Ref-Primary, within 7   days for hospital follow- up.  The following labs/tests are recommended:   BMP.           Unresulted Labs Ordered in the Past 30 Days of this Admission     Date and Time Order Name Status Description    8/17/2020 2225 Blood culture Preliminary     8/17/2020 2225 Blood culture Preliminary         Discharge Disposition   Discharged to home  Condition at discharge: Stable    Hospital Course   Summary: Maria Guadalupe Betancourt is a 65 year old female with PMH chronic paranoid schizoaffective disorder, Type 2 Diabetes mellitus causing retinopathy, and reportedly cirrhosis, Hepatitis C, and COPD, as well as prior seizure and stroke, who was admitted 8/17/2020 with UTI, encephalopathy, and hyperlgycemia.    E coli UTI    Patient is homeless, presented with dysuria. Noted fever to 100.8F here, no leukocytosis. UA growing >100k E coli, pansensitive.   Plan:  - Continue Ceftin at discharge.     Possible psychiatric decompensation  Acute toxic versus metabolic encephalopathy  Septic Encephalopathy, most likely due to UTI.  Patient reportedly declined most cares on presentation in the ED. Somnolent overnight after receiving 5 mg Haldol in the ED for spitting, kicking and screaming. Currently very little interaction.  - Appreciate Psychiatry consult - started Zyprexa Zydis 5 mg b.i.d.  - Overall she is very high risk for re-admission. She was deemed not holdable by psych. At this time she is decisional to best of out clinical assessment. This is her first hospitalization for hyperglycemia.    -  Issue is that the patient does not comply with medication because of multiple reasons, including cognitive impairment vs likely some degree of mental mental illness interferring with compliance vs denial of diabetes.   - Should recurrent hospitalizations become an issue, she should likely have a guardian and re-assessment for decision making capacity.      Hyperglycemia  Type 2 Diabetes mellitus  Likely uncontrolled. Most recent A1c 15.2 in 2/2020. Prescribed Glargine, Aspart, and Metformin as an outpatient. Presented with hyperglycemia and elevated Ketones but without metabolic acidosis. Elevated Ketones could be due to starvation/dehydration. BG still suboptimal .   Plan:  - Continue on Lantus, increased to 20 U units daily  - Metformin BID     Thrombocytopenia  Hx ccirrhosis  Hx Hep C   Thrombocytopenia is mild here.      Prior seizure and stroke: Reportedly, but no further details of this history at present        Consultations This Hospital Stay   PSYCHIATRY IP CONSULT  PSYCHIATRY IP CONSULT    Code Status   Full Code    Time Spent on this Encounter   I, Abhilash Lopez MD, personally saw the patient today and spent greater than 30 minutes discharging this patient.       Abhilash Lopez MD  [facility]  ______________________________________________________________________    Physical Exam   Vital Signs: Temp: 98.6  F (37  C) Temp src: Oral BP: (!) 148/85 Pulse: 88   Resp: 18 SpO2: 95 % O2 Device: None (Room air)    Weight: 56 lbs 14.4 oz       Primary Care Physician   Physician No Ref-Primary    Discharge Orders      Home care nursing referral      Reason for your hospital stay    You had elevated blood sugar levels and a urinary tract infection.     Follow-up and recommended labs and tests     Follow up with primary care provider, Physician No Ref-Primary, within 7 days for hospital follow- up.  The following labs/tests are recommended: BMP.     Activity    Your activity upon discharge: activity as  tolerated     Diet    Follow this diet upon discharge: Orders Placed This Encounter      Combination Diet Regular Diet Adult; 9090-9839 Calories: Moderate Consistent CHO (4-6 CHO units/meal)       Significant Results and Procedures   Most Recent 3 CBC's:  Recent Labs   Lab Test 08/17/20 2222   WBC 6.6   HGB 13.9   MCV 86   *     Most Recent 3 BMP's:  Recent Labs   Lab Test 08/17/20  2222      POTASSIUM 3.9   CHLORIDE 105   CO2 22   BUN 15   CR 0.44*   ANIONGAP 8   LAKESHA 9.1   *     Most Recent 2 LFT's:  Recent Labs   Lab Test 08/17/20  2222   AST 14   ALT 20   ALKPHOS 90   BILITOTAL 0.6     Most Recent 3 INR's:No lab results found.  Most Recent 6 Bacteria Isolates From Any Culture (See EPIC Reports for Culture Details):  Recent Labs   Lab Test 08/17/20 2246 08/17/20  1431   CULT No growth after 3 days  No growth after 3 days >100,000 colonies/mL  Escherichia coli  *     Most Recent Urinalysis:  Recent Labs   Lab Test 08/17/20  1431   COLOR Yellow   APPEARANCE Slightly Cloudy   URINEGLC >1000*   URINEBILI Negative   URINEKETONE 10*   SG 1.030   UBLD Moderate*   URINEPH 7.0   PROTEIN 30*   NITRITE Negative   LEUKEST Large*   RBCU >182*   WBCU >182*   ,   Results for orders placed or performed during the hospital encounter of 08/17/20   XR Chest Port 1 View    Narrative    EXAM: XR CHEST PORT 1 VW  LOCATION: [facility]  DATE/TIME: 8/17/2020 10:56 PM    INDICATION: Fever  COMPARISON: 03/29/2015      Impression    IMPRESSION: Heart size and pulmonary vascularity are normal. The lungs are clear.       Discharge Medications   Current Discharge Medication List      START taking these medications    Details   cefuroxime (CEFTIN) 500 MG tablet Take 1 tablet (500 mg) by mouth 2 times daily for 5 days  Qty: 10 tablet, Refills: 0    Associated Diagnoses: Urinary tract infection without hematuria, site unspecified      insulin glargine (LANTUS SOLOSTAR) 100 UNIT/ML pen Inject 20 Units  Subcutaneous At Bedtime Lantus Solostar Pen  Qty: 15 mL, Refills: 0    Associated Diagnoses: Hyperglycemia      metFORMIN (GLUCOPHAGE) 500 MG tablet Take 1 tablet (500 mg) by mouth 2 times daily (with meals)  Qty: 30 tablet, Refills: 0    Associated Diagnoses: Hyperglycemia      OLANZapine zydis (ZYPREXA) 5 MG ODT Take 1 tablet (5 mg) by mouth 2 times daily  Qty: 30 tablet, Refills: 0    Associated Diagnoses: Metabolic encephalopathy         STOP taking these medications       cephALEXin (KEFLEX) 500 MG capsule Comments:   Reason for Stopping:             Allergies   Allergies   Allergen Reactions     Sulfur

## 2020-08-24 LAB
BACTERIA SPEC CULT: NO GROWTH
BACTERIA SPEC CULT: NO GROWTH
SPECIMEN SOURCE: NORMAL
SPECIMEN SOURCE: NORMAL

## 2022-01-01 ENCOUNTER — HOSPITAL ENCOUNTER (INPATIENT)
Facility: CLINIC | Age: 67
LOS: 9 days | End: 2022-11-17
Attending: STUDENT IN AN ORGANIZED HEALTH CARE EDUCATION/TRAINING PROGRAM | Admitting: STUDENT IN AN ORGANIZED HEALTH CARE EDUCATION/TRAINING PROGRAM
Payer: COMMERCIAL

## 2022-01-01 ENCOUNTER — APPOINTMENT (OUTPATIENT)
Dept: CT IMAGING | Facility: CLINIC | Age: 67
End: 2022-01-01
Attending: STUDENT IN AN ORGANIZED HEALTH CARE EDUCATION/TRAINING PROGRAM
Payer: COMMERCIAL

## 2022-01-01 ENCOUNTER — APPOINTMENT (OUTPATIENT)
Dept: ULTRASOUND IMAGING | Facility: CLINIC | Age: 67
End: 2022-01-01
Attending: EMERGENCY MEDICINE
Payer: COMMERCIAL

## 2022-01-01 ENCOUNTER — APPOINTMENT (OUTPATIENT)
Dept: GENERAL RADIOLOGY | Facility: CLINIC | Age: 67
End: 2022-01-01
Attending: EMERGENCY MEDICINE
Payer: COMMERCIAL

## 2022-01-01 ENCOUNTER — DOCUMENTATION ONLY (OUTPATIENT)
Dept: PALLIATIVE CARE | Facility: CLINIC | Age: 67
End: 2022-01-01

## 2022-01-01 ENCOUNTER — DOCUMENTATION ONLY (OUTPATIENT)
Dept: OTHER | Facility: CLINIC | Age: 67
End: 2022-01-01

## 2022-01-01 ENCOUNTER — APPOINTMENT (OUTPATIENT)
Dept: OCCUPATIONAL THERAPY | Facility: CLINIC | Age: 67
End: 2022-01-01
Attending: STUDENT IN AN ORGANIZED HEALTH CARE EDUCATION/TRAINING PROGRAM
Payer: COMMERCIAL

## 2022-01-01 ENCOUNTER — HOSPITAL ENCOUNTER (OUTPATIENT)
Facility: CLINIC | Age: 67
Setting detail: OBSERVATION
Discharge: HOSPICE | End: 2022-11-07
Attending: EMERGENCY MEDICINE | Admitting: RADIOLOGY
Payer: COMMERCIAL

## 2022-01-01 VITALS
HEART RATE: 70 BPM | WEIGHT: 113.8 LBS | DIASTOLIC BLOOD PRESSURE: 76 MMHG | SYSTOLIC BLOOD PRESSURE: 162 MMHG | BODY MASS INDEX: 19.43 KG/M2 | HEIGHT: 64 IN | TEMPERATURE: 97.7 F | OXYGEN SATURATION: 98 % | RESPIRATION RATE: 16 BRPM

## 2022-01-01 VITALS — OXYGEN SATURATION: 95 %

## 2022-01-01 DIAGNOSIS — K74.60 CIRRHOSIS OF LIVER WITH ASCITES, UNSPECIFIED HEPATIC CIRRHOSIS TYPE (H): ICD-10-CM

## 2022-01-01 DIAGNOSIS — Z20.822 CONTACT WITH AND (SUSPECTED) EXPOSURE TO COVID-19: ICD-10-CM

## 2022-01-01 DIAGNOSIS — R18.8 CIRRHOSIS OF LIVER WITH ASCITES, UNSPECIFIED HEPATIC CIRRHOSIS TYPE (H): ICD-10-CM

## 2022-01-01 DIAGNOSIS — Z90.49 S/P LAPAROSCOPIC CHOLECYSTECTOMY: ICD-10-CM

## 2022-01-01 LAB
ALBUMIN SERPL BCG-MCNC: 2.8 G/DL (ref 3.5–5.2)
ALBUMIN SERPL-MCNC: 1.9 G/DL (ref 3.4–5)
ALBUMIN UR-MCNC: 200 MG/DL
ALBUMIN UR-MCNC: 300 MG/DL
ALP SERPL-CCNC: 428 U/L (ref 35–104)
ALP SERPL-CCNC: 448 U/L (ref 40–150)
ALT SERPL W P-5'-P-CCNC: 23 U/L (ref 10–35)
ALT SERPL W P-5'-P-CCNC: 25 U/L (ref 0–50)
AMMONIA PLAS-SCNC: 38 UMOL/L (ref 10–50)
AMMONIA PLAS-SCNC: 53 UMOL/L (ref 11–51)
ANION GAP SERPL CALCULATED.3IONS-SCNC: 23 MMOL/L (ref 7–15)
ANION GAP SERPL CALCULATED.3IONS-SCNC: 9 MMOL/L (ref 3–14)
APPEARANCE UR: ABNORMAL
APPEARANCE UR: ABNORMAL
AST SERPL W P-5'-P-CCNC: 72 U/L (ref 10–35)
AST SERPL W P-5'-P-CCNC: ABNORMAL U/L
ATRIAL RATE - MUSE: 90 BPM
BACTERIA #/AREA URNS HPF: ABNORMAL /HPF
BACTERIA UR CULT: ABNORMAL
BACTERIA UR CULT: NORMAL
BASOPHILS # BLD AUTO: 0 10E3/UL (ref 0–0.2)
BASOPHILS NFR BLD AUTO: 0 %
BILIRUB SERPL-MCNC: 21 MG/DL (ref 0.2–1.3)
BILIRUB SERPL-MCNC: 23.1 MG/DL
BILIRUB UR QL STRIP: ABNORMAL
BILIRUB UR QL STRIP: ABNORMAL
BUN SERPL-MCNC: 11.8 MG/DL (ref 8–23)
BUN SERPL-MCNC: 12 MG/DL (ref 7–30)
CALCIUM SERPL-MCNC: 8.4 MG/DL (ref 8.5–10.1)
CALCIUM SERPL-MCNC: 9.4 MG/DL (ref 8.8–10.2)
CAOX CRY #/AREA URNS HPF: ABNORMAL /HPF
CHLORIDE BLD-SCNC: 105 MMOL/L (ref 94–109)
CHLORIDE SERPL-SCNC: 97 MMOL/L (ref 98–107)
CO2 SERPL-SCNC: 25 MMOL/L (ref 20–32)
COLOR UR AUTO: ABNORMAL
COLOR UR AUTO: ABNORMAL
CREAT SERPL-MCNC: 0.59 MG/DL (ref 0.51–0.95)
CREAT SERPL-MCNC: 0.63 MG/DL (ref 0.52–1.04)
CREAT SERPL-MCNC: 0.69 MG/DL (ref 0.51–0.95)
DEPRECATED HCO3 PLAS-SCNC: 17 MMOL/L (ref 22–29)
DIASTOLIC BLOOD PRESSURE - MUSE: NORMAL MMHG
EOSINOPHIL # BLD AUTO: 0.1 10E3/UL (ref 0–0.7)
EOSINOPHIL NFR BLD AUTO: 2 %
ERYTHROCYTE [DISTWIDTH] IN BLOOD BY AUTOMATED COUNT: 19.9 % (ref 10–15)
ERYTHROCYTE [DISTWIDTH] IN BLOOD BY AUTOMATED COUNT: 20.2 % (ref 10–15)
GFR SERPL CREATININE-BSD FRML MDRD: >90 ML/MIN/1.73M2
GLUCOSE BLD-MCNC: 195 MG/DL (ref 70–99)
GLUCOSE BLDC GLUCOMTR-MCNC: 113 MG/DL (ref 70–99)
GLUCOSE BLDC GLUCOMTR-MCNC: 117 MG/DL (ref 70–99)
GLUCOSE BLDC GLUCOMTR-MCNC: 119 MG/DL (ref 70–99)
GLUCOSE BLDC GLUCOMTR-MCNC: 120 MG/DL (ref 70–99)
GLUCOSE BLDC GLUCOMTR-MCNC: 121 MG/DL (ref 70–99)
GLUCOSE BLDC GLUCOMTR-MCNC: 124 MG/DL (ref 70–99)
GLUCOSE BLDC GLUCOMTR-MCNC: 124 MG/DL (ref 70–99)
GLUCOSE BLDC GLUCOMTR-MCNC: 130 MG/DL (ref 70–99)
GLUCOSE BLDC GLUCOMTR-MCNC: 133 MG/DL (ref 70–99)
GLUCOSE BLDC GLUCOMTR-MCNC: 133 MG/DL (ref 70–99)
GLUCOSE BLDC GLUCOMTR-MCNC: 137 MG/DL (ref 70–99)
GLUCOSE BLDC GLUCOMTR-MCNC: 138 MG/DL (ref 70–99)
GLUCOSE BLDC GLUCOMTR-MCNC: 141 MG/DL (ref 70–99)
GLUCOSE BLDC GLUCOMTR-MCNC: 142 MG/DL (ref 70–99)
GLUCOSE BLDC GLUCOMTR-MCNC: 144 MG/DL (ref 70–99)
GLUCOSE BLDC GLUCOMTR-MCNC: 144 MG/DL (ref 70–99)
GLUCOSE BLDC GLUCOMTR-MCNC: 146 MG/DL (ref 70–99)
GLUCOSE BLDC GLUCOMTR-MCNC: 147 MG/DL (ref 70–99)
GLUCOSE BLDC GLUCOMTR-MCNC: 148 MG/DL (ref 70–99)
GLUCOSE BLDC GLUCOMTR-MCNC: 148 MG/DL (ref 70–99)
GLUCOSE BLDC GLUCOMTR-MCNC: 150 MG/DL (ref 70–99)
GLUCOSE BLDC GLUCOMTR-MCNC: 153 MG/DL (ref 70–99)
GLUCOSE BLDC GLUCOMTR-MCNC: 159 MG/DL (ref 70–99)
GLUCOSE BLDC GLUCOMTR-MCNC: 161 MG/DL (ref 70–99)
GLUCOSE BLDC GLUCOMTR-MCNC: 163 MG/DL (ref 70–99)
GLUCOSE BLDC GLUCOMTR-MCNC: 164 MG/DL (ref 70–99)
GLUCOSE BLDC GLUCOMTR-MCNC: 166 MG/DL (ref 70–99)
GLUCOSE BLDC GLUCOMTR-MCNC: 181 MG/DL (ref 70–99)
GLUCOSE BLDC GLUCOMTR-MCNC: 183 MG/DL (ref 70–99)
GLUCOSE BLDC GLUCOMTR-MCNC: 185 MG/DL (ref 70–99)
GLUCOSE BLDC GLUCOMTR-MCNC: 192 MG/DL (ref 70–99)
GLUCOSE BLDC GLUCOMTR-MCNC: 200 MG/DL (ref 70–99)
GLUCOSE BLDC GLUCOMTR-MCNC: 200 MG/DL (ref 70–99)
GLUCOSE BLDC GLUCOMTR-MCNC: 204 MG/DL (ref 70–99)
GLUCOSE SERPL-MCNC: 136 MG/DL (ref 70–99)
GLUCOSE UR STRIP-MCNC: 100 MG/DL
GLUCOSE UR STRIP-MCNC: NEGATIVE MG/DL
HCT VFR BLD AUTO: 28 % (ref 35–47)
HCT VFR BLD AUTO: 31.9 % (ref 35–47)
HGB BLD-MCNC: 10.3 G/DL (ref 11.7–15.7)
HGB BLD-MCNC: 9.5 G/DL (ref 11.7–15.7)
HGB UR QL STRIP: ABNORMAL
HGB UR QL STRIP: ABNORMAL
HOLD SPECIMEN: NORMAL
HYALINE CASTS: 1 /LPF
IMM GRANULOCYTES # BLD: 0.1 10E3/UL
IMM GRANULOCYTES NFR BLD: 1 %
INR PPP: 2.42 (ref 0.85–1.15)
INR PPP: 4.48 (ref 0.85–1.15)
INTERPRETATION ECG - MUSE: NORMAL
KETONES UR STRIP-MCNC: NEGATIVE MG/DL
KETONES UR STRIP-MCNC: NEGATIVE MG/DL
LEUKOCYTE ESTERASE UR QL STRIP: ABNORMAL
LEUKOCYTE ESTERASE UR QL STRIP: ABNORMAL
LYMPHOCYTES # BLD AUTO: 1.1 10E3/UL (ref 0.8–5.3)
LYMPHOCYTES NFR BLD AUTO: 17 %
MCH RBC QN AUTO: 32.1 PG (ref 26.5–33)
MCH RBC QN AUTO: 32.3 PG (ref 26.5–33)
MCHC RBC AUTO-ENTMCNC: 32.3 G/DL (ref 31.5–36.5)
MCHC RBC AUTO-ENTMCNC: 33.9 G/DL (ref 31.5–36.5)
MCV RBC AUTO: 95 FL (ref 78–100)
MCV RBC AUTO: 99 FL (ref 78–100)
MONOCYTES # BLD AUTO: 0.5 10E3/UL (ref 0–1.3)
MONOCYTES NFR BLD AUTO: 7 %
MUCOUS THREADS #/AREA URNS LPF: PRESENT /LPF
NEUTROPHILS # BLD AUTO: 4.7 10E3/UL (ref 1.6–8.3)
NEUTROPHILS NFR BLD AUTO: 73 %
NITRATE UR QL: NEGATIVE
NITRATE UR QL: POSITIVE
NRBC # BLD AUTO: 0 10E3/UL
NRBC BLD AUTO-RTO: 0 /100
NT-PROBNP SERPL-MCNC: 2551 PG/ML (ref 0–900)
P AXIS - MUSE: 49 DEGREES
PH UR STRIP: 5.5 [PH] (ref 5–7)
PH UR STRIP: 6.5 [PH] (ref 5–7)
PLATELET # BLD AUTO: 320 10E3/UL (ref 150–450)
PLATELET # BLD AUTO: 360 10E3/UL (ref 150–450)
POTASSIUM BLD-SCNC: 3.2 MMOL/L (ref 3.4–5.3)
POTASSIUM SERPL-SCNC: 2.9 MMOL/L (ref 3.4–5.3)
PR INTERVAL - MUSE: 154 MS
PROT SERPL-MCNC: 6.8 G/DL (ref 6.4–8.3)
PROT SERPL-MCNC: 7 G/DL (ref 6.8–8.8)
QRS DURATION - MUSE: 80 MS
QT - MUSE: 386 MS
QTC - MUSE: 472 MS
R AXIS - MUSE: 41 DEGREES
RBC # BLD AUTO: 2.94 10E6/UL (ref 3.8–5.2)
RBC # BLD AUTO: 3.21 10E6/UL (ref 3.8–5.2)
RBC URINE: 4 /HPF
RBC URINE: 90 /HPF
SARS-COV-2 RNA RESP QL NAA+PROBE: NEGATIVE
SODIUM SERPL-SCNC: 137 MMOL/L (ref 136–145)
SODIUM SERPL-SCNC: 139 MMOL/L (ref 133–144)
SP GR UR STRIP: 1.03 (ref 1–1.03)
SP GR UR STRIP: 1.03 (ref 1–1.03)
SQUAMOUS EPITHELIAL: 6 /HPF
SYSTOLIC BLOOD PRESSURE - MUSE: NORMAL MMHG
T AXIS - MUSE: 87 DEGREES
TRANSITIONAL EPI: 1 /HPF
UROBILINOGEN UR STRIP-MCNC: NORMAL MG/DL
UROBILINOGEN UR STRIP-MCNC: NORMAL MG/DL
VENTRICULAR RATE- MUSE: 90 BPM
WBC # BLD AUTO: 6.1 10E3/UL (ref 4–11)
WBC # BLD AUTO: 6.5 10E3/UL (ref 4–11)
WBC URINE: 3 /HPF
WBC URINE: 39 /HPF

## 2022-01-01 PROCEDURE — 250N000013 HC RX MED GY IP 250 OP 250 PS 637

## 2022-01-01 PROCEDURE — 99223 1ST HOSP IP/OBS HIGH 75: CPT | Mod: AI | Performed by: INTERNAL MEDICINE

## 2022-01-01 PROCEDURE — C9803 HOPD COVID-19 SPEC COLLECT: HCPCS | Performed by: EMERGENCY MEDICINE

## 2022-01-01 PROCEDURE — 85610 PROTHROMBIN TIME: CPT | Performed by: EMERGENCY MEDICINE

## 2022-01-01 PROCEDURE — 250N000013 HC RX MED GY IP 250 OP 250 PS 637: Performed by: PHYSICIAN ASSISTANT

## 2022-01-01 PROCEDURE — 82962 GLUCOSE BLOOD TEST: CPT

## 2022-01-01 PROCEDURE — 83880 ASSAY OF NATRIURETIC PEPTIDE: CPT | Performed by: EMERGENCY MEDICINE

## 2022-01-01 PROCEDURE — 99207 PR APP CREDIT; MD BILLING SHARED VISIT: CPT | Performed by: PHYSICIAN ASSISTANT

## 2022-01-01 PROCEDURE — 110N000005 HC R&B HOSPICE, ACCENT

## 2022-01-01 PROCEDURE — U0005 INFEC AGEN DETEC AMPLI PROBE: HCPCS | Performed by: EMERGENCY MEDICINE

## 2022-01-01 PROCEDURE — 250N000013 HC RX MED GY IP 250 OP 250 PS 637: Performed by: STUDENT IN AN ORGANIZED HEALTH CARE EDUCATION/TRAINING PROGRAM

## 2022-01-01 PROCEDURE — G0378 HOSPITAL OBSERVATION PER HR: HCPCS

## 2022-01-01 PROCEDURE — 250N000011 HC RX IP 250 OP 636: Performed by: EMERGENCY MEDICINE

## 2022-01-01 PROCEDURE — 84155 ASSAY OF PROTEIN SERUM: CPT | Performed by: EMERGENCY MEDICINE

## 2022-01-01 PROCEDURE — 80053 COMPREHEN METABOLIC PANEL: CPT | Performed by: INTERNAL MEDICINE

## 2022-01-01 PROCEDURE — 99231 SBSQ HOSP IP/OBS SF/LOW 25: CPT | Mod: GW | Performed by: PHYSICIAN ASSISTANT

## 2022-01-01 PROCEDURE — 99207 PR APP CREDIT; MD BILLING SHARED VISIT: CPT | Performed by: STUDENT IN AN ORGANIZED HEALTH CARE EDUCATION/TRAINING PROGRAM

## 2022-01-01 PROCEDURE — 999N000128 HC STATISTIC PERIPHERAL IV START W/O US GUIDANCE

## 2022-01-01 PROCEDURE — 74176 CT ABD & PELVIS W/O CONTRAST: CPT

## 2022-01-01 PROCEDURE — 93970 EXTREMITY STUDY: CPT

## 2022-01-01 PROCEDURE — 99225 PR SUBSEQUENT OBSERVATION CARE,LEVEL II: CPT | Mod: FS | Performed by: STUDENT IN AN ORGANIZED HEALTH CARE EDUCATION/TRAINING PROGRAM

## 2022-01-01 PROCEDURE — 250N000012 HC RX MED GY IP 250 OP 636 PS 637: Performed by: INTERNAL MEDICINE

## 2022-01-01 PROCEDURE — 87086 URINE CULTURE/COLONY COUNT: CPT | Performed by: EMERGENCY MEDICINE

## 2022-01-01 PROCEDURE — 36415 COLL VENOUS BLD VENIPUNCTURE: CPT | Performed by: EMERGENCY MEDICINE

## 2022-01-01 PROCEDURE — 99214 OFFICE O/P EST MOD 30 MIN: CPT | Mod: GC | Performed by: INTERNAL MEDICINE

## 2022-01-01 PROCEDURE — 250N000011 HC RX IP 250 OP 636: Performed by: STUDENT IN AN ORGANIZED HEALTH CARE EDUCATION/TRAINING PROGRAM

## 2022-01-01 PROCEDURE — 99239 HOSP IP/OBS DSCHRG MGMT >30: CPT | Mod: GW | Performed by: INTERNAL MEDICINE

## 2022-01-01 PROCEDURE — 99232 SBSQ HOSP IP/OBS MODERATE 35: CPT | Mod: GW | Performed by: PHYSICIAN ASSISTANT

## 2022-01-01 PROCEDURE — 99226 PR SUBSEQUENT OBSERVATION CARE,LEVEL III: CPT | Mod: FS | Performed by: STUDENT IN AN ORGANIZED HEALTH CARE EDUCATION/TRAINING PROGRAM

## 2022-01-01 PROCEDURE — 85610 PROTHROMBIN TIME: CPT | Performed by: INTERNAL MEDICINE

## 2022-01-01 PROCEDURE — 74176 CT ABD & PELVIS W/O CONTRAST: CPT | Mod: 26 | Performed by: RADIOLOGY

## 2022-01-01 PROCEDURE — 96376 TX/PRO/DX INJ SAME DRUG ADON: CPT

## 2022-01-01 PROCEDURE — 97165 OT EVAL LOW COMPLEX 30 MIN: CPT | Mod: GO

## 2022-01-01 PROCEDURE — 99203 OFFICE O/P NEW LOW 30 MIN: CPT | Mod: GC | Performed by: INTERNAL MEDICINE

## 2022-01-01 PROCEDURE — 99214 OFFICE O/P EST MOD 30 MIN: CPT | Performed by: CLINICAL NURSE SPECIALIST

## 2022-01-01 PROCEDURE — 36415 COLL VENOUS BLD VENIPUNCTURE: CPT | Performed by: INTERNAL MEDICINE

## 2022-01-01 PROCEDURE — 81001 URINALYSIS AUTO W/SCOPE: CPT | Performed by: PHYSICIAN ASSISTANT

## 2022-01-01 PROCEDURE — 99285 EMERGENCY DEPT VISIT HI MDM: CPT | Mod: 25 | Performed by: EMERGENCY MEDICINE

## 2022-01-01 PROCEDURE — 99231 SBSQ HOSP IP/OBS SF/LOW 25: CPT | Mod: GW

## 2022-01-01 PROCEDURE — 99285 EMERGENCY DEPT VISIT HI MDM: CPT | Performed by: EMERGENCY MEDICINE

## 2022-01-01 PROCEDURE — 96375 TX/PRO/DX INJ NEW DRUG ADDON: CPT

## 2022-01-01 PROCEDURE — 93005 ELECTROCARDIOGRAM TRACING: CPT | Performed by: EMERGENCY MEDICINE

## 2022-01-01 PROCEDURE — 96372 THER/PROPH/DIAG INJ SC/IM: CPT

## 2022-01-01 PROCEDURE — 82565 ASSAY OF CREATININE: CPT | Performed by: PHYSICIAN ASSISTANT

## 2022-01-01 PROCEDURE — 99217 PR OBSERVATION CARE DISCHARGE: CPT | Mod: FS | Performed by: STUDENT IN AN ORGANIZED HEALTH CARE EDUCATION/TRAINING PROGRAM

## 2022-01-01 PROCEDURE — 81001 URINALYSIS AUTO W/SCOPE: CPT | Performed by: EMERGENCY MEDICINE

## 2022-01-01 PROCEDURE — 120N000002 HC R&B MED SURG/OB UMMC

## 2022-01-01 PROCEDURE — 76705 ECHO EXAM OF ABDOMEN: CPT

## 2022-01-01 PROCEDURE — 99204 OFFICE O/P NEW MOD 45 MIN: CPT | Mod: GC | Performed by: INTERNAL MEDICINE

## 2022-01-01 PROCEDURE — 99232 SBSQ HOSP IP/OBS MODERATE 35: CPT | Mod: GW | Performed by: INTERNAL MEDICINE

## 2022-01-01 PROCEDURE — 82140 ASSAY OF AMMONIA: CPT | Performed by: EMERGENCY MEDICINE

## 2022-01-01 PROCEDURE — 71046 X-RAY EXAM CHEST 2 VIEWS: CPT

## 2022-01-01 PROCEDURE — 82310 ASSAY OF CALCIUM: CPT | Performed by: EMERGENCY MEDICINE

## 2022-01-01 PROCEDURE — 99205 OFFICE O/P NEW HI 60 MIN: CPT | Performed by: CLINICAL NURSE SPECIALIST

## 2022-01-01 PROCEDURE — 99213 OFFICE O/P EST LOW 20 MIN: CPT | Performed by: CLINICAL NURSE SPECIALIST

## 2022-01-01 PROCEDURE — 99212 OFFICE O/P EST SF 10 MIN: CPT | Performed by: CLINICAL NURSE SPECIALIST

## 2022-01-01 PROCEDURE — 99207 PR NO BILLABLE SERVICE THIS VISIT: CPT | Performed by: STUDENT IN AN ORGANIZED HEALTH CARE EDUCATION/TRAINING PROGRAM

## 2022-01-01 PROCEDURE — 96372 THER/PROPH/DIAG INJ SC/IM: CPT | Mod: XU | Performed by: INTERNAL MEDICINE

## 2022-01-01 PROCEDURE — 82140 ASSAY OF AMMONIA: CPT | Performed by: STUDENT IN AN ORGANIZED HEALTH CARE EDUCATION/TRAINING PROGRAM

## 2022-01-01 PROCEDURE — 250N000012 HC RX MED GY IP 250 OP 636 PS 637: Performed by: STUDENT IN AN ORGANIZED HEALTH CARE EDUCATION/TRAINING PROGRAM

## 2022-01-01 PROCEDURE — 96365 THER/PROPH/DIAG IV INF INIT: CPT | Performed by: EMERGENCY MEDICINE

## 2022-01-01 PROCEDURE — 99207 PR APP CREDIT; MD BILLING SHARED VISIT: CPT

## 2022-01-01 PROCEDURE — 87086 URINE CULTURE/COLONY COUNT: CPT | Performed by: PHYSICIAN ASSISTANT

## 2022-01-01 PROCEDURE — 96372 THER/PROPH/DIAG INJ SC/IM: CPT | Performed by: STUDENT IN AN ORGANIZED HEALTH CARE EDUCATION/TRAINING PROGRAM

## 2022-01-01 PROCEDURE — 96372 THER/PROPH/DIAG INJ SC/IM: CPT | Performed by: INTERNAL MEDICINE

## 2022-01-01 PROCEDURE — 93010 ELECTROCARDIOGRAM REPORT: CPT | Performed by: EMERGENCY MEDICINE

## 2022-01-01 PROCEDURE — 85027 COMPLETE CBC AUTOMATED: CPT | Performed by: INTERNAL MEDICINE

## 2022-01-01 PROCEDURE — 36416 COLLJ CAPILLARY BLOOD SPEC: CPT | Performed by: PHYSICIAN ASSISTANT

## 2022-01-01 PROCEDURE — 85041 AUTOMATED RBC COUNT: CPT | Performed by: EMERGENCY MEDICINE

## 2022-01-01 RX ORDER — MORPHINE SULFATE 20 MG/ML
10 SOLUTION ORAL EVERY 4 HOURS
Status: DISCONTINUED | OUTPATIENT
Start: 2022-01-01 | End: 2022-01-01 | Stop reason: HOSPADM

## 2022-01-01 RX ORDER — LORAZEPAM 0.5 MG/1
0.5 TABLET ORAL EVERY 4 HOURS PRN
Status: DISCONTINUED | OUTPATIENT
Start: 2022-01-01 | End: 2022-01-01

## 2022-01-01 RX ORDER — TRAZODONE HYDROCHLORIDE 50 MG/1
50 TABLET, FILM COATED ORAL AT BEDTIME
Status: DISCONTINUED | OUTPATIENT
Start: 2022-01-01 | End: 2022-01-01 | Stop reason: CLARIF

## 2022-01-01 RX ORDER — LORAZEPAM 2 MG/ML
0.5 CONCENTRATE ORAL EVERY 4 HOURS PRN
Status: DISCONTINUED | OUTPATIENT
Start: 2022-01-01 | End: 2022-01-01 | Stop reason: HOSPADM

## 2022-01-01 RX ORDER — BUPRENORPHINE 5 UG/H
1 PATCH TRANSDERMAL WEEKLY
Status: SHIPPED | DISCHARGE
Start: 2022-01-01

## 2022-01-01 RX ORDER — MIRTAZAPINE 15 MG/1
15 TABLET, FILM COATED ORAL AT BEDTIME
Status: ON HOLD | COMMUNITY
End: 2022-01-01

## 2022-01-01 RX ORDER — HALOPERIDOL 2 MG/ML
2 SOLUTION ORAL EVERY 6 HOURS
Status: DISCONTINUED | OUTPATIENT
Start: 2022-01-01 | End: 2022-01-01 | Stop reason: HOSPADM

## 2022-01-01 RX ORDER — MORPHINE SULFATE 20 MG/ML
5 SOLUTION ORAL 4 TIMES DAILY
Status: DISCONTINUED | OUTPATIENT
Start: 2022-01-01 | End: 2022-01-01

## 2022-01-01 RX ORDER — LORAZEPAM 2 MG/ML
1 INJECTION INTRAMUSCULAR EVERY 4 HOURS PRN
Status: DISCONTINUED | OUTPATIENT
Start: 2022-01-01 | End: 2022-01-01

## 2022-01-01 RX ORDER — MORPHINE SULFATE 20 MG/ML
5-10 SOLUTION ORAL
Status: DISCONTINUED | OUTPATIENT
Start: 2022-01-01 | End: 2022-01-01 | Stop reason: HOSPADM

## 2022-01-01 RX ORDER — HYDROXYZINE HYDROCHLORIDE 50 MG/1
50 TABLET, FILM COATED ORAL EVERY 6 HOURS PRN
Status: DISCONTINUED | OUTPATIENT
Start: 2022-01-01 | End: 2022-01-01 | Stop reason: HOSPADM

## 2022-01-01 RX ORDER — NALOXONE HYDROCHLORIDE 0.4 MG/ML
0.2 INJECTION, SOLUTION INTRAMUSCULAR; INTRAVENOUS; SUBCUTANEOUS
Status: DISCONTINUED | OUTPATIENT
Start: 2022-01-01 | End: 2022-01-01 | Stop reason: HOSPADM

## 2022-01-01 RX ORDER — OXYCODONE HYDROCHLORIDE 5 MG/1
5 TABLET ORAL
Status: COMPLETED | OUTPATIENT
Start: 2022-01-01 | End: 2022-01-01

## 2022-01-01 RX ORDER — OLANZAPINE 10 MG/1
5 TABLET ORAL 2 TIMES DAILY
Status: ON HOLD | COMMUNITY
End: 2022-01-01

## 2022-01-01 RX ORDER — CEPHALEXIN 500 MG/1
500 CAPSULE ORAL 4 TIMES DAILY
Status: DISCONTINUED | OUTPATIENT
Start: 2022-01-01 | End: 2022-01-01

## 2022-01-01 RX ORDER — NICOTINE POLACRILEX 4 MG
15-30 LOZENGE BUCCAL
Status: DISCONTINUED | OUTPATIENT
Start: 2022-01-01 | End: 2022-01-01 | Stop reason: HOSPADM

## 2022-01-01 RX ORDER — FUROSEMIDE 10 MG/ML
20 INJECTION INTRAMUSCULAR; INTRAVENOUS ONCE
Status: COMPLETED | OUTPATIENT
Start: 2022-01-01 | End: 2022-01-01

## 2022-01-01 RX ORDER — HALOPERIDOL 5 MG/ML
2 INJECTION INTRAMUSCULAR EVERY 6 HOURS PRN
Status: DISCONTINUED | OUTPATIENT
Start: 2022-01-01 | End: 2022-01-01 | Stop reason: HOSPADM

## 2022-01-01 RX ORDER — BISACODYL 10 MG
10 SUPPOSITORY, RECTAL RECTAL DAILY PRN
Status: DISCONTINUED | OUTPATIENT
Start: 2022-01-01 | End: 2022-01-01 | Stop reason: HOSPADM

## 2022-01-01 RX ORDER — LORAZEPAM 0.5 MG/1
1 TABLET ORAL
Status: DISCONTINUED | OUTPATIENT
Start: 2022-01-01 | End: 2022-01-01 | Stop reason: HOSPADM

## 2022-01-01 RX ORDER — CEFTRIAXONE 1 G/1
1 INJECTION, POWDER, FOR SOLUTION INTRAMUSCULAR; INTRAVENOUS EVERY 24 HOURS
Status: DISCONTINUED | OUTPATIENT
Start: 2022-01-01 | End: 2022-01-01

## 2022-01-01 RX ORDER — MICONAZOLE NITRATE 20 MG/G
CREAM TOPICAL 2 TIMES DAILY
Status: DISCONTINUED | OUTPATIENT
Start: 2022-01-01 | End: 2022-01-01 | Stop reason: HOSPADM

## 2022-01-01 RX ORDER — TRAZODONE HYDROCHLORIDE 50 MG/1
50 TABLET, FILM COATED ORAL AT BEDTIME
Status: DISCONTINUED | OUTPATIENT
Start: 2022-01-01 | End: 2022-01-01

## 2022-01-01 RX ORDER — DIAZEPAM 10 MG/2ML
5 INJECTION, SOLUTION INTRAMUSCULAR; INTRAVENOUS EVERY 6 HOURS PRN
Status: DISCONTINUED | OUTPATIENT
Start: 2022-01-01 | End: 2022-01-01 | Stop reason: HOSPADM

## 2022-01-01 RX ORDER — LORAZEPAM 0.5 MG/1
0.5 TABLET ORAL EVERY 4 HOURS PRN
COMMUNITY

## 2022-01-01 RX ORDER — CARVEDILOL 25 MG/1
25 TABLET ORAL 2 TIMES DAILY WITH MEALS
Status: DISCONTINUED | OUTPATIENT
Start: 2022-01-01 | End: 2022-01-01 | Stop reason: CLARIF

## 2022-01-01 RX ORDER — DEXTROSE MONOHYDRATE 25 G/50ML
25-50 INJECTION, SOLUTION INTRAVENOUS
Status: DISCONTINUED | OUTPATIENT
Start: 2022-01-01 | End: 2022-01-01 | Stop reason: HOSPADM

## 2022-01-01 RX ORDER — HYDROMORPHONE HYDROCHLORIDE 1 MG/ML
0.5 INJECTION, SOLUTION INTRAMUSCULAR; INTRAVENOUS; SUBCUTANEOUS
Status: DISCONTINUED | OUTPATIENT
Start: 2022-01-01 | End: 2022-01-01

## 2022-01-01 RX ORDER — FUROSEMIDE 10 MG/ML
40 INJECTION INTRAMUSCULAR; INTRAVENOUS ONCE
Status: DISCONTINUED | OUTPATIENT
Start: 2022-01-01 | End: 2022-01-01

## 2022-01-01 RX ORDER — GABAPENTIN 100 MG/1
200 CAPSULE ORAL 4 TIMES DAILY
Status: ON HOLD | COMMUNITY
End: 2022-01-01

## 2022-01-01 RX ORDER — LIDOCAINE 40 MG/G
CREAM TOPICAL
Status: DISCONTINUED | OUTPATIENT
Start: 2022-01-01 | End: 2022-01-01 | Stop reason: HOSPADM

## 2022-01-01 RX ORDER — QUETIAPINE FUMARATE 100 MG/1
100 TABLET, FILM COATED ORAL AT BEDTIME
Status: ON HOLD | COMMUNITY
End: 2022-01-01

## 2022-01-01 RX ORDER — CEPHALEXIN 500 MG/1
500 CAPSULE ORAL EVERY 12 HOURS SCHEDULED
Status: DISCONTINUED | OUTPATIENT
Start: 2022-01-01 | End: 2022-01-01

## 2022-01-01 RX ORDER — LIDOCAINE 4 G/G
1 PATCH TOPICAL
Status: DISCONTINUED | OUTPATIENT
Start: 2022-01-01 | End: 2022-01-01

## 2022-01-01 RX ORDER — GABAPENTIN 100 MG/1
100 CAPSULE ORAL EVERY 4 HOURS PRN
COMMUNITY

## 2022-01-01 RX ORDER — ROSUVASTATIN CALCIUM 5 MG/1
5 TABLET, COATED ORAL AT BEDTIME
Status: ON HOLD | COMMUNITY
End: 2022-01-01

## 2022-01-01 RX ORDER — BUPRENORPHINE 5 UG/H
1 PATCH TRANSDERMAL WEEKLY
Status: DISCONTINUED | OUTPATIENT
Start: 2022-01-01 | End: 2022-01-01 | Stop reason: HOSPADM

## 2022-01-01 RX ORDER — ACETAMINOPHEN 650 MG/1
650 SUPPOSITORY RECTAL EVERY 6 HOURS PRN
Status: DISCONTINUED | OUTPATIENT
Start: 2022-01-01 | End: 2022-01-01 | Stop reason: HOSPADM

## 2022-01-01 RX ORDER — HYDROXYZINE HYDROCHLORIDE 25 MG/1
25 TABLET, FILM COATED ORAL EVERY 6 HOURS PRN
Status: DISCONTINUED | OUTPATIENT
Start: 2022-01-01 | End: 2022-01-01 | Stop reason: HOSPADM

## 2022-01-01 RX ORDER — HYDROCODONE BITARTRATE AND ACETAMINOPHEN 5; 325 MG/1; MG/1
1 TABLET ORAL EVERY 4 HOURS PRN
Status: DISCONTINUED | OUTPATIENT
Start: 2022-01-01 | End: 2022-01-01

## 2022-01-01 RX ORDER — CARVEDILOL 25 MG/1
25 TABLET ORAL 2 TIMES DAILY WITH MEALS
Status: ON HOLD | COMMUNITY
End: 2022-01-01

## 2022-01-01 RX ORDER — TRAZODONE HYDROCHLORIDE 50 MG/1
50 TABLET, FILM COATED ORAL AT BEDTIME
Status: ON HOLD | COMMUNITY
End: 2022-01-01

## 2022-01-01 RX ORDER — POTASSIUM CHLORIDE 29.8 MG/ML
20 INJECTION INTRAVENOUS ONCE
Status: DISCONTINUED | OUTPATIENT
Start: 2022-01-01 | End: 2022-01-01

## 2022-01-01 RX ORDER — MORPHINE SULFATE 20 MG/ML
5-10 SOLUTION ORAL
Status: DISCONTINUED | OUTPATIENT
Start: 2022-01-01 | End: 2022-01-01

## 2022-01-01 RX ORDER — CEFDINIR 125 MG/5ML
300 POWDER, FOR SUSPENSION ORAL 2 TIMES DAILY
Status: DISPENSED | OUTPATIENT
Start: 2022-01-01 | End: 2022-01-01

## 2022-01-01 RX ORDER — ONDANSETRON 4 MG/1
4 TABLET, FILM COATED ORAL EVERY 8 HOURS PRN
Status: DISCONTINUED | OUTPATIENT
Start: 2022-01-01 | End: 2022-01-01 | Stop reason: HOSPADM

## 2022-01-01 RX ORDER — ONDANSETRON 4 MG/1
4 TABLET, FILM COATED ORAL EVERY 8 HOURS PRN
Status: ON HOLD | COMMUNITY
End: 2022-01-01

## 2022-01-01 RX ORDER — ASPIRIN 81 MG/1
324 TABLET, CHEWABLE ORAL ONCE
Status: DISCONTINUED | OUTPATIENT
Start: 2022-01-01 | End: 2022-01-01

## 2022-01-01 RX ORDER — FUROSEMIDE 10 MG/ML
20 INJECTION INTRAMUSCULAR; INTRAVENOUS DAILY
Status: DISCONTINUED | OUTPATIENT
Start: 2022-01-01 | End: 2022-01-01

## 2022-01-01 RX ORDER — POTASSIUM CHLORIDE 7.45 MG/ML
10 INJECTION INTRAVENOUS
Status: DISPENSED | OUTPATIENT
Start: 2022-01-01 | End: 2022-01-01

## 2022-01-01 RX ORDER — POTASSIUM CHLORIDE 7.45 MG/ML
10 INJECTION INTRAVENOUS ONCE
Status: COMPLETED | OUTPATIENT
Start: 2022-01-01 | End: 2022-01-01

## 2022-01-01 RX ORDER — LOPERAMIDE HCL 2 MG
4 CAPSULE ORAL EVERY 4 HOURS PRN
Status: DISCONTINUED | OUTPATIENT
Start: 2022-01-01 | End: 2022-01-01 | Stop reason: HOSPADM

## 2022-01-01 RX ORDER — MORPHINE SULFATE 20 MG/ML
5-10 SOLUTION ORAL
Refills: 0 | Status: SHIPPED | DISCHARGE
Start: 2022-01-01

## 2022-01-01 RX ORDER — HYDRALAZINE HYDROCHLORIDE 20 MG/ML
10 INJECTION INTRAMUSCULAR; INTRAVENOUS EVERY 4 HOURS PRN
Status: DISCONTINUED | OUTPATIENT
Start: 2022-01-01 | End: 2022-01-01

## 2022-01-01 RX ORDER — MORPHINE SULFATE 20 MG/ML
5 SOLUTION ORAL 3 TIMES DAILY
Status: DISCONTINUED | OUTPATIENT
Start: 2022-01-01 | End: 2022-01-01

## 2022-01-01 RX ORDER — LOPERAMIDE HCL 2 MG
4 CAPSULE ORAL EVERY 4 HOURS PRN
Status: ON HOLD | COMMUNITY
End: 2022-01-01

## 2022-01-01 RX ORDER — HALOPERIDOL 2 MG/ML
1-2 SOLUTION ORAL EVERY 4 HOURS PRN
Status: DISCONTINUED | OUTPATIENT
Start: 2022-01-01 | End: 2022-01-01 | Stop reason: HOSPADM

## 2022-01-01 RX ORDER — BUPRENORPHINE 5 UG/H
1 PATCH TRANSDERMAL WEEKLY
Status: DISCONTINUED | OUTPATIENT
Start: 2022-01-01 | End: 2022-01-01

## 2022-01-01 RX ORDER — AMOXICILLIN 250 MG
1 CAPSULE ORAL 2 TIMES DAILY
Status: DISCONTINUED | OUTPATIENT
Start: 2022-01-01 | End: 2022-01-01

## 2022-01-01 RX ORDER — AMOXICILLIN 250 MG
1 CAPSULE ORAL DAILY
Status: ON HOLD | COMMUNITY
End: 2022-01-01

## 2022-01-01 RX ORDER — ACETAMINOPHEN 325 MG/10.15ML
650 LIQUID ORAL EVERY 6 HOURS PRN
Status: DISCONTINUED | OUTPATIENT
Start: 2022-01-01 | End: 2022-01-01 | Stop reason: HOSPADM

## 2022-01-01 RX ORDER — PHENAZOPYRIDINE HYDROCHLORIDE 100 MG/1
100 TABLET, FILM COATED ORAL 3 TIMES DAILY PRN
Status: DISCONTINUED | OUTPATIENT
Start: 2022-01-01 | End: 2022-01-01 | Stop reason: HOSPADM

## 2022-01-01 RX ORDER — PANTOPRAZOLE SODIUM 40 MG/1
40 TABLET, DELAYED RELEASE ORAL
Status: DISCONTINUED | OUTPATIENT
Start: 2022-01-01 | End: 2022-01-01

## 2022-01-01 RX ORDER — IOPAMIDOL 755 MG/ML
67 INJECTION, SOLUTION INTRAVASCULAR ONCE
Status: DISCONTINUED | OUTPATIENT
Start: 2022-01-01 | End: 2022-01-01

## 2022-01-01 RX ORDER — GABAPENTIN 100 MG/1
100 CAPSULE ORAL EVERY 4 HOURS PRN
Status: DISCONTINUED | OUTPATIENT
Start: 2022-01-01 | End: 2022-01-01 | Stop reason: HOSPADM

## 2022-01-01 RX ORDER — INSULIN LISPRO 100 [IU]/ML
INJECTION, SOLUTION INTRAVENOUS; SUBCUTANEOUS
Status: ON HOLD | COMMUNITY
End: 2022-01-01

## 2022-01-01 RX ORDER — NALOXONE HYDROCHLORIDE 0.4 MG/ML
0.1 INJECTION, SOLUTION INTRAMUSCULAR; INTRAVENOUS; SUBCUTANEOUS
Status: DISCONTINUED | OUTPATIENT
Start: 2022-01-01 | End: 2022-01-01 | Stop reason: HOSPADM

## 2022-01-01 RX ORDER — NALOXONE HYDROCHLORIDE 0.4 MG/ML
0.4 INJECTION, SOLUTION INTRAMUSCULAR; INTRAVENOUS; SUBCUTANEOUS
Status: DISCONTINUED | OUTPATIENT
Start: 2022-01-01 | End: 2022-01-01 | Stop reason: HOSPADM

## 2022-01-01 RX ORDER — MORPHINE SULFATE 20 MG/ML
5 SOLUTION ORAL EVERY 4 HOURS PRN
Status: DISCONTINUED | OUTPATIENT
Start: 2022-01-01 | End: 2022-01-01

## 2022-01-01 RX ORDER — HYDRALAZINE HYDROCHLORIDE 10 MG/1
10 TABLET, FILM COATED ORAL EVERY 4 HOURS PRN
Status: DISCONTINUED | OUTPATIENT
Start: 2022-01-01 | End: 2022-01-01 | Stop reason: HOSPADM

## 2022-01-01 RX ORDER — ACETAMINOPHEN 325 MG/10.15ML
650 LIQUID ORAL EVERY 4 HOURS PRN
Status: DISCONTINUED | OUTPATIENT
Start: 2022-01-01 | End: 2022-01-01 | Stop reason: HOSPADM

## 2022-01-01 RX ORDER — ONDANSETRON 2 MG/ML
4 INJECTION INTRAMUSCULAR; INTRAVENOUS EVERY 6 HOURS PRN
Status: DISCONTINUED | OUTPATIENT
Start: 2022-01-01 | End: 2022-01-01 | Stop reason: HOSPADM

## 2022-01-01 RX ORDER — MORPHINE SULFATE 20 MG/ML
5-10 SOLUTION ORAL EVERY 4 HOURS PRN
Status: DISCONTINUED | OUTPATIENT
Start: 2022-01-01 | End: 2022-01-01

## 2022-01-01 RX ORDER — LORAZEPAM 2 MG/ML
1 CONCENTRATE ORAL
Status: DISCONTINUED | OUTPATIENT
Start: 2022-01-01 | End: 2022-01-01 | Stop reason: HOSPADM

## 2022-01-01 RX ORDER — FUROSEMIDE 20 MG
20 TABLET ORAL DAILY
Status: DISCONTINUED | OUTPATIENT
Start: 2022-01-01 | End: 2022-01-01

## 2022-01-01 RX ORDER — ONDANSETRON 4 MG/1
4 TABLET, ORALLY DISINTEGRATING ORAL EVERY 6 HOURS PRN
Status: DISCONTINUED | OUTPATIENT
Start: 2022-01-01 | End: 2022-01-01 | Stop reason: HOSPADM

## 2022-01-01 RX ORDER — CEFDINIR 300 MG/1
300 CAPSULE ORAL EVERY 12 HOURS SCHEDULED
Status: DISCONTINUED | OUTPATIENT
Start: 2022-01-01 | End: 2022-01-01

## 2022-01-01 RX ORDER — FUROSEMIDE 20 MG
20 TABLET ORAL DAILY
Status: ON HOLD | COMMUNITY
End: 2022-01-01

## 2022-01-01 RX ORDER — HYDROCODONE BITARTRATE AND ACETAMINOPHEN 5; 325 MG/1; MG/1
1-2 TABLET ORAL EVERY 4 HOURS PRN
Status: ON HOLD | COMMUNITY
End: 2022-01-01

## 2022-01-01 RX ORDER — FAMOTIDINE 20 MG/1
20 TABLET, FILM COATED ORAL 2 TIMES DAILY
Status: ON HOLD | COMMUNITY
End: 2022-01-01

## 2022-01-01 RX ADMIN — CEPHALEXIN 500 MG: 500 CAPSULE ORAL at 12:24

## 2022-01-01 RX ADMIN — MORPHINE SULFATE 10 MG: 100 SOLUTION ORAL at 23:51

## 2022-01-01 RX ADMIN — HYDROMORPHONE HYDROCHLORIDE 0.5 MG: 1 INJECTION, SOLUTION INTRAMUSCULAR; INTRAVENOUS; SUBCUTANEOUS at 08:58

## 2022-01-01 RX ADMIN — Medication 10 MG: at 00:20

## 2022-01-01 RX ADMIN — CAMPHOR AND MENTHOL: 5; 5 LOTION TOPICAL at 13:27

## 2022-01-01 RX ADMIN — CARVEDILOL 25 MG: 25 TABLET, FILM COATED ORAL at 17:20

## 2022-01-01 RX ADMIN — HALOPERIDOL 2 MG: 2 SOLUTION ORAL at 11:02

## 2022-01-01 RX ADMIN — Medication 2.5 MG: at 16:34

## 2022-01-01 RX ADMIN — CARVEDILOL 25 MG: 25 TABLET, FILM COATED ORAL at 09:48

## 2022-01-01 RX ADMIN — HALOPERIDOL 2 MG: 2 SOLUTION ORAL at 13:16

## 2022-01-01 RX ADMIN — Medication 10 MG: at 18:14

## 2022-01-01 RX ADMIN — HALOPERIDOL 2 MG: 2 SOLUTION ORAL at 12:56

## 2022-01-01 RX ADMIN — MORPHINE SULFATE 10 MG: 100 SOLUTION ORAL at 23:50

## 2022-01-01 RX ADMIN — Medication 10 MG: at 08:11

## 2022-01-01 RX ADMIN — Medication 2.5 MG: at 19:50

## 2022-01-01 RX ADMIN — INSULIN ASPART 1 UNITS: 100 INJECTION, SOLUTION INTRAVENOUS; SUBCUTANEOUS at 13:14

## 2022-01-01 RX ADMIN — MORPHINE SULFATE 10 MG: 100 SOLUTION ORAL at 18:23

## 2022-01-01 RX ADMIN — INSULIN ASPART 1 UNITS: 100 INJECTION, SOLUTION INTRAVENOUS; SUBCUTANEOUS at 14:01

## 2022-01-01 RX ADMIN — CEFDINIR 300 MG: 300 CAPSULE ORAL at 22:09

## 2022-01-01 RX ADMIN — INSULIN ASPART 1 UNITS: 100 INJECTION, SOLUTION INTRAVENOUS; SUBCUTANEOUS at 14:36

## 2022-01-01 RX ADMIN — MICONAZOLE NITRATE: 20 CREAM TOPICAL at 20:02

## 2022-01-01 RX ADMIN — BUPRENORPHINE 1 PATCH: 5 PATCH TRANSDERMAL at 11:18

## 2022-01-01 RX ADMIN — MORPHINE SULFATE 10 MG: 100 SOLUTION ORAL at 11:18

## 2022-01-01 RX ADMIN — LORAZEPAM 1 MG: 2 LIQUID ORAL at 13:11

## 2022-01-01 RX ADMIN — LIDOCAINE PATCH 4% 1 PATCH: 40 PATCH TOPICAL at 09:19

## 2022-01-01 RX ADMIN — CEFTRIAXONE SODIUM 1 G: 1 INJECTION, POWDER, FOR SOLUTION INTRAMUSCULAR; INTRAVENOUS at 17:52

## 2022-01-01 RX ADMIN — Medication 10 MG: at 02:38

## 2022-01-01 RX ADMIN — HALOPERIDOL 2 MG: 2 SOLUTION ORAL at 16:30

## 2022-01-01 RX ADMIN — HALOPERIDOL 2 MG: 2 SOLUTION ORAL at 02:35

## 2022-01-01 RX ADMIN — CEPHALEXIN 500 MG: 500 CAPSULE ORAL at 17:20

## 2022-01-01 RX ADMIN — HYDROMORPHONE HYDROCHLORIDE 0.5 MG: 1 INJECTION, SOLUTION INTRAMUSCULAR; INTRAVENOUS; SUBCUTANEOUS at 00:13

## 2022-01-01 RX ADMIN — HALOPERIDOL 2 MG: 2 SOLUTION ORAL at 08:06

## 2022-01-01 RX ADMIN — CEFDINIR 300 MG: 125 POWDER, FOR SUSPENSION ORAL at 11:27

## 2022-01-01 RX ADMIN — Medication 10 MG: at 06:46

## 2022-01-01 RX ADMIN — LORAZEPAM 0.5 MG: 0.5 TABLET ORAL at 16:02

## 2022-01-01 RX ADMIN — Medication 2.5 MG: at 04:25

## 2022-01-01 RX ADMIN — LIDOCAINE PATCH 4% 1 PATCH: 40 PATCH TOPICAL at 09:44

## 2022-01-01 RX ADMIN — MORPHINE SULFATE 10 MG: 100 SOLUTION ORAL at 19:30

## 2022-01-01 RX ADMIN — INSULIN ASPART 1 UNITS: 100 INJECTION, SOLUTION INTRAVENOUS; SUBCUTANEOUS at 11:27

## 2022-01-01 RX ADMIN — LIDOCAINE PATCH 4% 1 PATCH: 40 PATCH TOPICAL at 09:11

## 2022-01-01 RX ADMIN — CEPHALEXIN 500 MG: 500 CAPSULE ORAL at 19:43

## 2022-01-01 RX ADMIN — INSULIN ASPART 1 UNITS: 100 INJECTION, SOLUTION INTRAVENOUS; SUBCUTANEOUS at 14:33

## 2022-01-01 RX ADMIN — Medication 10 MG: at 16:21

## 2022-01-01 RX ADMIN — HALOPERIDOL 2 MG: 2 SOLUTION ORAL at 08:37

## 2022-01-01 RX ADMIN — LIDOCAINE PATCH 4% 1 PATCH: 40 PATCH TOPICAL at 10:43

## 2022-01-01 RX ADMIN — HALOPERIDOL 2 MG: 2 SOLUTION ORAL at 13:11

## 2022-01-01 RX ADMIN — CEPHALEXIN 500 MG: 500 CAPSULE ORAL at 09:00

## 2022-01-01 RX ADMIN — HALOPERIDOL 2 MG: 2 SOLUTION ORAL at 22:04

## 2022-01-01 RX ADMIN — Medication 10 MG: at 09:03

## 2022-01-01 RX ADMIN — HALOPERIDOL 2 MG: 2 SOLUTION ORAL at 21:44

## 2022-01-01 RX ADMIN — MORPHINE SULFATE 10 MG: 100 SOLUTION ORAL at 03:32

## 2022-01-01 RX ADMIN — BUPRENORPHINE 1 PATCH: 5 PATCH TRANSDERMAL at 10:05

## 2022-01-01 RX ADMIN — HALOPERIDOL 2 MG: 2 SOLUTION ORAL at 02:18

## 2022-01-01 RX ADMIN — INSULIN ASPART 1 UNITS: 100 INJECTION, SOLUTION INTRAVENOUS; SUBCUTANEOUS at 19:40

## 2022-01-01 RX ADMIN — MORPHINE SULFATE 10 MG: 100 SOLUTION ORAL at 11:30

## 2022-01-01 RX ADMIN — FUROSEMIDE 20 MG: 20 TABLET ORAL at 16:02

## 2022-01-01 RX ADMIN — MORPHINE SULFATE 10 MG: 100 SOLUTION ORAL at 08:05

## 2022-01-01 RX ADMIN — CARVEDILOL 25 MG: 25 TABLET, FILM COATED ORAL at 18:53

## 2022-01-01 RX ADMIN — MORPHINE SULFATE 10 MG: 100 SOLUTION ORAL at 08:25

## 2022-01-01 RX ADMIN — LORAZEPAM 0.5 MG: 2 LIQUID ORAL at 13:53

## 2022-01-01 RX ADMIN — CEFDINIR 300 MG: 300 CAPSULE ORAL at 09:48

## 2022-01-01 RX ADMIN — Medication 10 MG: at 10:38

## 2022-01-01 RX ADMIN — INSULIN GLARGINE 6 UNITS: 100 INJECTION, SOLUTION SUBCUTANEOUS at 14:35

## 2022-01-01 RX ADMIN — HALOPERIDOL 2 MG: 2 SOLUTION ORAL at 21:50

## 2022-01-01 RX ADMIN — CEPHALEXIN 500 MG: 500 CAPSULE ORAL at 09:12

## 2022-01-01 RX ADMIN — MORPHINE SULFATE 10 MG: 100 SOLUTION ORAL at 00:15

## 2022-01-01 RX ADMIN — HALOPERIDOL 2 MG: 2 SOLUTION ORAL at 11:05

## 2022-01-01 RX ADMIN — HALOPERIDOL 2 MG: 2 SOLUTION ORAL at 08:54

## 2022-01-01 RX ADMIN — HYDROCODONE BITARTRATE AND ACETAMINOPHEN 1 TABLET: 5; 325 TABLET ORAL at 14:24

## 2022-01-01 RX ADMIN — HALOPERIDOL 2 MG: 2 SOLUTION ORAL at 13:03

## 2022-01-01 RX ADMIN — MORPHINE SULFATE 10 MG: 100 SOLUTION ORAL at 08:36

## 2022-01-01 RX ADMIN — HALOPERIDOL 2 MG: 2 SOLUTION ORAL at 02:14

## 2022-01-01 RX ADMIN — HALOPERIDOL 2 MG: 2 SOLUTION ORAL at 20:20

## 2022-01-01 RX ADMIN — CEPHALEXIN 500 MG: 500 CAPSULE ORAL at 18:08

## 2022-01-01 RX ADMIN — Medication 2.5 MG: at 03:53

## 2022-01-01 RX ADMIN — HALOPERIDOL 2 MG: 2 SOLUTION ORAL at 19:57

## 2022-01-01 RX ADMIN — HALOPERIDOL 2 MG: 2 SOLUTION ORAL at 03:24

## 2022-01-01 RX ADMIN — Medication 40 MG: at 08:28

## 2022-01-01 RX ADMIN — HALOPERIDOL 2 MG: 2 SOLUTION ORAL at 06:46

## 2022-01-01 RX ADMIN — LIDOCAINE PATCH 4% 1 PATCH: 40 PATCH TOPICAL at 09:00

## 2022-01-01 RX ADMIN — HYDROCODONE BITARTRATE AND ACETAMINOPHEN 1 TABLET: 5; 325 TABLET ORAL at 02:56

## 2022-01-01 RX ADMIN — MORPHINE SULFATE 5 MG: 100 SOLUTION ORAL at 10:39

## 2022-01-01 RX ADMIN — HALOPERIDOL 2 MG: 2 SOLUTION ORAL at 20:11

## 2022-01-01 RX ADMIN — HYDROCODONE BITARTRATE AND ACETAMINOPHEN 1 TABLET: 5; 325 TABLET ORAL at 02:12

## 2022-01-01 RX ADMIN — MORPHINE SULFATE 5 MG: 100 SOLUTION ORAL at 13:38

## 2022-01-01 RX ADMIN — MORPHINE SULFATE 10 MG: 100 SOLUTION ORAL at 11:02

## 2022-01-01 RX ADMIN — HALOPERIDOL 2 MG: 2 SOLUTION ORAL at 01:51

## 2022-01-01 RX ADMIN — HALOPERIDOL 2 MG: 2 SOLUTION ORAL at 13:32

## 2022-01-01 RX ADMIN — MORPHINE SULFATE 10 MG: 100 SOLUTION ORAL at 16:53

## 2022-01-01 RX ADMIN — FUROSEMIDE 20 MG: 20 TABLET ORAL at 09:48

## 2022-01-01 RX ADMIN — Medication 40 MG: at 08:18

## 2022-01-01 RX ADMIN — HALOPERIDOL 2 MG: 2 SOLUTION ORAL at 20:25

## 2022-01-01 RX ADMIN — HALOPERIDOL 2 MG: 2 SOLUTION ORAL at 02:21

## 2022-01-01 RX ADMIN — CEFDINIR 300 MG: 300 CAPSULE ORAL at 20:53

## 2022-01-01 RX ADMIN — TRAZODONE HYDROCHLORIDE 50 MG: 50 TABLET ORAL at 21:47

## 2022-01-01 RX ADMIN — LORAZEPAM 1 MG: 2 LIQUID ORAL at 19:10

## 2022-01-01 RX ADMIN — FUROSEMIDE 20 MG: 20 TABLET ORAL at 09:12

## 2022-01-01 RX ADMIN — MORPHINE SULFATE 10 MG: 100 SOLUTION ORAL at 23:15

## 2022-01-01 RX ADMIN — Medication 10 MG: at 03:33

## 2022-01-01 RX ADMIN — Medication 10 MG: at 07:48

## 2022-01-01 RX ADMIN — HYDRALAZINE HYDROCHLORIDE 10 MG: 20 INJECTION, SOLUTION INTRAMUSCULAR; INTRAVENOUS at 16:33

## 2022-01-01 RX ADMIN — CARVEDILOL 25 MG: 25 TABLET, FILM COATED ORAL at 09:00

## 2022-01-01 RX ADMIN — HALOPERIDOL 2 MG: 2 SOLUTION ORAL at 15:41

## 2022-01-01 RX ADMIN — MORPHINE SULFATE 10 MG: 100 SOLUTION ORAL at 13:04

## 2022-01-01 RX ADMIN — MORPHINE SULFATE 10 MG: 100 SOLUTION ORAL at 14:45

## 2022-01-01 RX ADMIN — MORPHINE SULFATE 5 MG: 100 SOLUTION ORAL at 20:02

## 2022-01-01 RX ADMIN — MORPHINE SULFATE 10 MG: 100 SOLUTION ORAL at 22:04

## 2022-01-01 RX ADMIN — CARVEDILOL 25 MG: 25 TABLET, FILM COATED ORAL at 18:16

## 2022-01-01 RX ADMIN — TRAZODONE HYDROCHLORIDE 50 MG: 50 TABLET ORAL at 22:09

## 2022-01-01 RX ADMIN — MORPHINE SULFATE 10 MG: 100 SOLUTION ORAL at 09:23

## 2022-01-01 RX ADMIN — HYDROCODONE BITARTRATE AND ACETAMINOPHEN 1 TABLET: 5; 325 TABLET ORAL at 18:52

## 2022-01-01 RX ADMIN — MICONAZOLE NITRATE: 20 CREAM TOPICAL at 09:27

## 2022-01-01 RX ADMIN — CEPHALEXIN 500 MG: 500 CAPSULE ORAL at 18:16

## 2022-01-01 RX ADMIN — POTASSIUM CHLORIDE 10 MEQ: 7.46 INJECTION, SOLUTION INTRAVENOUS at 01:49

## 2022-01-01 RX ADMIN — CARVEDILOL 25 MG: 25 TABLET, FILM COATED ORAL at 18:08

## 2022-01-01 RX ADMIN — Medication 2.5 MG: at 21:09

## 2022-01-01 RX ADMIN — HALOPERIDOL 2 MG: 2 SOLUTION ORAL at 08:15

## 2022-01-01 RX ADMIN — CEPHALEXIN 500 MG: 500 CAPSULE ORAL at 13:12

## 2022-01-01 RX ADMIN — HALOPERIDOL 2 MG: 2 SOLUTION ORAL at 16:02

## 2022-01-01 RX ADMIN — CAMPHOR AND MENTHOL: 5; 5 LOTION TOPICAL at 12:32

## 2022-01-01 RX ADMIN — Medication 10 MG: at 20:01

## 2022-01-01 RX ADMIN — INSULIN ASPART 1 UNITS: 100 INJECTION, SOLUTION INTRAVENOUS; SUBCUTANEOUS at 11:50

## 2022-01-01 RX ADMIN — LIDOCAINE PATCH 4% 1 PATCH: 40 PATCH TOPICAL at 11:27

## 2022-01-01 RX ADMIN — FUROSEMIDE 20 MG: 10 INJECTION, SOLUTION INTRAMUSCULAR; INTRAVENOUS at 16:12

## 2022-01-01 RX ADMIN — HALOPERIDOL 2 MG: 2 SOLUTION ORAL at 19:38

## 2022-01-01 RX ADMIN — HALOPERIDOL 2 MG: 2 SOLUTION ORAL at 09:03

## 2022-01-01 RX ADMIN — MORPHINE SULFATE 10 MG: 100 SOLUTION ORAL at 21:21

## 2022-01-01 RX ADMIN — HALOPERIDOL 2 MG: 2 SOLUTION ORAL at 19:10

## 2022-01-01 RX ADMIN — HYDROCODONE BITARTRATE AND ACETAMINOPHEN 1 TABLET: 5; 325 TABLET ORAL at 17:20

## 2022-01-01 RX ADMIN — CARVEDILOL 25 MG: 25 TABLET, FILM COATED ORAL at 09:11

## 2022-01-01 RX ADMIN — MORPHINE SULFATE 10 MG: 100 SOLUTION ORAL at 15:08

## 2022-01-01 RX ADMIN — HYDROCODONE BITARTRATE AND ACETAMINOPHEN 1 TABLET: 5; 325 TABLET ORAL at 00:15

## 2022-01-01 RX ADMIN — INSULIN ASPART 1 UNITS: 100 INJECTION, SOLUTION INTRAVENOUS; SUBCUTANEOUS at 17:42

## 2022-01-01 RX ADMIN — FUROSEMIDE 20 MG: 20 TABLET ORAL at 09:00

## 2022-01-01 RX ADMIN — MORPHINE SULFATE 10 MG: 100 SOLUTION ORAL at 16:02

## 2022-01-01 RX ADMIN — HALOPERIDOL 2 MG: 2 SOLUTION ORAL at 07:47

## 2022-01-01 RX ADMIN — Medication 10 MG: at 18:43

## 2022-01-01 RX ADMIN — HYDROCODONE BITARTRATE AND ACETAMINOPHEN 1 TABLET: 5; 325 TABLET ORAL at 01:55

## 2022-01-01 RX ADMIN — HALOPERIDOL 2 MG: 2 SOLUTION ORAL at 04:32

## 2022-01-01 RX ADMIN — MORPHINE SULFATE 10 MG: 100 SOLUTION ORAL at 02:31

## 2022-01-01 RX ADMIN — MORPHINE SULFATE 10 MG: 100 SOLUTION ORAL at 07:02

## 2022-01-01 RX ADMIN — HYDROCODONE BITARTRATE AND ACETAMINOPHEN 1 TABLET: 5; 325 TABLET ORAL at 09:12

## 2022-01-01 RX ADMIN — Medication 40 MG: at 08:06

## 2022-01-01 RX ADMIN — OXYCODONE HYDROCHLORIDE 5 MG: 5 TABLET ORAL at 16:38

## 2022-01-01 RX ADMIN — MICONAZOLE NITRATE: 20 CREAM TOPICAL at 10:44

## 2022-01-01 RX ADMIN — POTASSIUM CHLORIDE 10 MEQ: 7.46 INJECTION, SOLUTION INTRAVENOUS at 18:00

## 2022-01-01 RX ADMIN — HYDROCODONE BITARTRATE AND ACETAMINOPHEN 1 TABLET: 5; 325 TABLET ORAL at 13:12

## 2022-01-01 RX ADMIN — Medication 2.5 MG: at 13:14

## 2022-01-01 RX ADMIN — TRAZODONE HYDROCHLORIDE 50 MG: 50 TABLET ORAL at 21:42

## 2022-01-01 ASSESSMENT — ACTIVITIES OF DAILY LIVING (ADL)
ADLS_ACUITY_SCORE: 48
ADLS_ACUITY_SCORE: 48
ADLS_ACUITY_SCORE: 47
ADLS_ACUITY_SCORE: 47
CONCENTRATING,_REMEMBERING_OR_MAKING_DECISIONS_DIFFICULTY: YES
ADLS_ACUITY_SCORE: 44
ADLS_ACUITY_SCORE: 42
ADLS_ACUITY_SCORE: 49
ADLS_ACUITY_SCORE: 47
ADLS_ACUITY_SCORE: 35
ADLS_ACUITY_SCORE: 47
ADLS_ACUITY_SCORE: 35
ADLS_ACUITY_SCORE: 48
ADLS_ACUITY_SCORE: 47
ADLS_ACUITY_SCORE: 48
ADLS_ACUITY_SCORE: 44
ADLS_ACUITY_SCORE: 45
ADLS_ACUITY_SCORE: 50
ADLS_ACUITY_SCORE: 44
ADLS_ACUITY_SCORE: 47
ADLS_ACUITY_SCORE: 43
ADLS_ACUITY_SCORE: 44
ADLS_ACUITY_SCORE: 46
ADLS_ACUITY_SCORE: 49
ADLS_ACUITY_SCORE: 51
ADLS_ACUITY_SCORE: 47
ADLS_ACUITY_SCORE: 46
ADLS_ACUITY_SCORE: 47
ADLS_ACUITY_SCORE: 56
ADLS_ACUITY_SCORE: 47
ADLS_ACUITY_SCORE: 56
ADLS_ACUITY_SCORE: 44
ADLS_ACUITY_SCORE: 35
ADLS_ACUITY_SCORE: 48
DRESS: 1-->ASSISTANCE (EQUIPMENT/PERSON) NEEDED (NOT DEVELOPMENTALLY APPROPRIATE)
ADLS_ACUITY_SCORE: 41
ADLS_ACUITY_SCORE: 46
ADLS_ACUITY_SCORE: 54
ADLS_ACUITY_SCORE: 47
WALKING_OR_CLIMBING_STAIRS: AMBULATION DIFFICULTY, ASSISTANCE 1 PERSON
ADLS_ACUITY_SCORE: 52
ADLS_ACUITY_SCORE: 52
FALL_HISTORY_WITHIN_LAST_SIX_MONTHS: NO
ADLS_ACUITY_SCORE: 52
ADLS_ACUITY_SCORE: 42
ADLS_ACUITY_SCORE: 46
ADLS_ACUITY_SCORE: 50
ADLS_ACUITY_SCORE: 52
DIFFICULTY_EATING/SWALLOWING: NO
EQUIPMENT_CURRENTLY_USED_AT_HOME: WALKER, STANDARD
ADLS_ACUITY_SCORE: 47
ADLS_ACUITY_SCORE: 48
ADLS_ACUITY_SCORE: 48
ADLS_ACUITY_SCORE: 35
ADLS_ACUITY_SCORE: 47
ADLS_ACUITY_SCORE: 51
TOILETING_ISSUES: YES
ADLS_ACUITY_SCORE: 41
CHANGE_IN_FUNCTIONAL_STATUS_SINCE_ONSET_OF_CURRENT_ILLNESS/INJURY: YES
ADLS_ACUITY_SCORE: 45
ADLS_ACUITY_SCORE: 47
ADLS_ACUITY_SCORE: 42
CONCENTRATING,_REMEMBERING_OR_MAKING_DECISIONS_DIFFICULTY: OTHER (SEE COMMENTS)
ADLS_ACUITY_SCORE: 47
BATHING: 1-->ASSISTANCE NEEDED
ADLS_ACUITY_SCORE: 47
ADLS_ACUITY_SCORE: 43
ADLS_ACUITY_SCORE: 48
ADLS_ACUITY_SCORE: 47
ADLS_ACUITY_SCORE: 46
DIFFICULTY_COMMUNICATING: NO
CONCENTRATING,_REMEMBERING_OR_MAKING_DECISIONS_DIFFICULTY: OTHER (SEE COMMENTS)
ADLS_ACUITY_SCORE: 46
ADLS_ACUITY_SCORE: 41
ADLS_ACUITY_SCORE: 46
ADLS_ACUITY_SCORE: 49
ADLS_ACUITY_SCORE: 56
ADLS_ACUITY_SCORE: 35
ADLS_ACUITY_SCORE: 49
ADLS_ACUITY_SCORE: 48
TOILETING: 1-->ASSISTANCE (EQUIPMENT/PERSON) NEEDED
DRESSING/BATHING: BATHING DIFFICULTY, REQUIRES EQUIPMENT
ADLS_ACUITY_SCORE: 47
DIFFICULTY_EATING/SWALLOWING: NO
ADLS_ACUITY_SCORE: 56
ADLS_ACUITY_SCORE: 47
ADLS_ACUITY_SCORE: 47
ADLS_ACUITY_SCORE: 52
DOING_ERRANDS_INDEPENDENTLY_DIFFICULTY: YES
ADLS_ACUITY_SCORE: 46
ADLS_ACUITY_SCORE: 35
ADLS_ACUITY_SCORE: 47
WALKING_OR_CLIMBING_STAIRS: AMBULATION DIFFICULTY, REQUIRES EQUIPMENT;AMBULATION DIFFICULTY, ASSISTANCE 1 PERSON
ADLS_ACUITY_SCORE: 49
ADLS_ACUITY_SCORE: 52
ADLS_ACUITY_SCORE: 51
ADLS_ACUITY_SCORE: 47
ADLS_ACUITY_SCORE: 41
WALKING_OR_CLIMBING_STAIRS_DIFFICULTY: YES
ADLS_ACUITY_SCORE: 43
ADLS_ACUITY_SCORE: 48
ADLS_ACUITY_SCORE: 52
WALKING_OR_CLIMBING_STAIRS_DIFFICULTY: YES
ADLS_ACUITY_SCORE: 48
ADLS_ACUITY_SCORE: 35
ADLS_ACUITY_SCORE: 49
ADLS_ACUITY_SCORE: 47
ADLS_ACUITY_SCORE: 46
DRESSING/BATHING: BATHING DIFFICULTY, REQUIRES EQUIPMENT
ADLS_ACUITY_SCORE: 47
ADLS_ACUITY_SCORE: 47
ADLS_ACUITY_SCORE: 49
ADLS_ACUITY_SCORE: 48
ADLS_ACUITY_SCORE: 47
ADLS_ACUITY_SCORE: 50
WEAR_GLASSES_OR_BLIND: NO
DRESS: 1-->ASSISTANCE (EQUIPMENT/PERSON) NEEDED (NOT DEVELOPMENTALLY APPROPRIATE)
ADLS_ACUITY_SCORE: 41
ADLS_ACUITY_SCORE: 46
WALKING_OR_CLIMBING_STAIRS_DIFFICULTY: YES
ADLS_ACUITY_SCORE: 42
ADLS_ACUITY_SCORE: 47
DRESS: 1-->ASSISTANCE (EQUIPMENT/PERSON) NEEDED
EQUIPMENT_CURRENTLY_USED_AT_HOME: WALKER, STANDARD
ADLS_ACUITY_SCORE: 52
ADLS_ACUITY_SCORE: 35
BATHING: 1-->ASSISTANCE NEEDED
ADLS_ACUITY_SCORE: 56
TOILETING_ISSUES: OTHER (SEE COMMENTS)
ADLS_ACUITY_SCORE: 35
ADLS_ACUITY_SCORE: 56
ADLS_ACUITY_SCORE: 47
ADLS_ACUITY_SCORE: 48
ADLS_ACUITY_SCORE: 48
DRESSING/BATHING_DIFFICULTY: YES
ADLS_ACUITY_SCORE: 41
ADLS_ACUITY_SCORE: 54
ADLS_ACUITY_SCORE: 48
ADLS_ACUITY_SCORE: 35
TRANSFERRING: 1-->ASSISTANCE (EQUIPMENT/PERSON) NEEDED (NOT DEVELOPMENTALLY APPROPRIATE)
DRESSING/BATHING_DIFFICULTY: YES
ADLS_ACUITY_SCORE: 46
WEAR_GLASSES_OR_BLIND: NO
ADLS_ACUITY_SCORE: 41
ADLS_ACUITY_SCORE: 48
TOILETING_ISSUES: YES
ADLS_ACUITY_SCORE: 50
ADLS_ACUITY_SCORE: 47
ADLS_ACUITY_SCORE: 49
ADLS_ACUITY_SCORE: 44
CHANGE_IN_FUNCTIONAL_STATUS_SINCE_ONSET_OF_CURRENT_ILLNESS/INJURY: YES
ADLS_ACUITY_SCORE: 49
ADLS_ACUITY_SCORE: 47
ADLS_ACUITY_SCORE: 47
ADLS_ACUITY_SCORE: 46
ADLS_ACUITY_SCORE: 46
TRANSFERRING: 1-->ASSISTANCE (EQUIPMENT/PERSON) NEEDED (NOT DEVELOPMENTALLY APPROPRIATE)
ADLS_ACUITY_SCORE: 56
ADLS_ACUITY_SCORE: 45
FALL_HISTORY_WITHIN_LAST_SIX_MONTHS: NO
ADLS_ACUITY_SCORE: 41
ADLS_ACUITY_SCORE: 46
ADLS_ACUITY_SCORE: 37
ADLS_ACUITY_SCORE: 44
ADLS_ACUITY_SCORE: 52
ADLS_ACUITY_SCORE: 48
ADLS_ACUITY_SCORE: 49
DIFFICULTY_EATING/SWALLOWING: OTHER (SEE COMMENTS)
ADLS_ACUITY_SCORE: 41
ADLS_ACUITY_SCORE: 56
ADLS_ACUITY_SCORE: 49
ADLS_ACUITY_SCORE: 47
ADLS_ACUITY_SCORE: 41
ADLS_ACUITY_SCORE: 47
TOILETING_ASSISTANCE: TOILETING DIFFICULTY, ASSISTANCE 1 PERSON
ADLS_ACUITY_SCORE: 47
ADLS_ACUITY_SCORE: 45
ADLS_ACUITY_SCORE: 48
ADLS_ACUITY_SCORE: 47
ADLS_ACUITY_SCORE: 42
ADLS_ACUITY_SCORE: 50
ADLS_ACUITY_SCORE: 42
ADLS_ACUITY_SCORE: 56
ADLS_ACUITY_SCORE: 46
ADLS_ACUITY_SCORE: 46
ADLS_ACUITY_SCORE: 41
ADLS_ACUITY_SCORE: 48
ADLS_ACUITY_SCORE: 51
ADLS_ACUITY_SCORE: 47
ADLS_ACUITY_SCORE: 56
ADLS_ACUITY_SCORE: 56
ADLS_ACUITY_SCORE: 41
ADLS_ACUITY_SCORE: 47
ADLS_ACUITY_SCORE: 48
ADLS_ACUITY_SCORE: 47
ADLS_ACUITY_SCORE: 49
ADLS_ACUITY_SCORE: 56
ADLS_ACUITY_SCORE: 56
ADLS_ACUITY_SCORE: 47
ADLS_ACUITY_SCORE: 44
ADLS_ACUITY_SCORE: 41
ADLS_ACUITY_SCORE: 42
ADLS_ACUITY_SCORE: 56
ADLS_ACUITY_SCORE: 41
ADLS_ACUITY_SCORE: 50
ADLS_ACUITY_SCORE: 46
ADLS_ACUITY_SCORE: 50
ADLS_ACUITY_SCORE: 46
ADLS_ACUITY_SCORE: 56
ADLS_ACUITY_SCORE: 47
ADLS_ACUITY_SCORE: 48
ADLS_ACUITY_SCORE: 41
ADLS_ACUITY_SCORE: 54
ADLS_ACUITY_SCORE: 35
ADLS_ACUITY_SCORE: 47
ADLS_ACUITY_SCORE: 45
ADLS_ACUITY_SCORE: 42
ADLS_ACUITY_SCORE: 45
ADLS_ACUITY_SCORE: 56
DRESSING/BATHING: BATHING DIFFICULTY, REQUIRES EQUIPMENT
ADLS_ACUITY_SCORE: 35
ADLS_ACUITY_SCORE: 35
ADLS_ACUITY_SCORE: 47
ADLS_ACUITY_SCORE: 50
ADLS_ACUITY_SCORE: 54
ADLS_ACUITY_SCORE: 46
ADLS_ACUITY_SCORE: 35
ADLS_ACUITY_SCORE: 41
ADLS_ACUITY_SCORE: 48
ADLS_ACUITY_SCORE: 46
ADLS_ACUITY_SCORE: 49
ADLS_ACUITY_SCORE: 49
DOING_ERRANDS_INDEPENDENTLY_DIFFICULTY: YES
ADLS_ACUITY_SCORE: 56
ADLS_ACUITY_SCORE: 50
ADLS_ACUITY_SCORE: 41
ADLS_ACUITY_SCORE: 49
TOILETING_ASSISTANCE: TOILETING DIFFICULTY, ASSISTANCE 1 PERSON
ADLS_ACUITY_SCORE: 33
ADLS_ACUITY_SCORE: 56
ADLS_ACUITY_SCORE: 51
DRESS: 1-->ASSISTANCE (EQUIPMENT/PERSON) NEEDED
ADLS_ACUITY_SCORE: 35
ADLS_ACUITY_SCORE: 41
ADLS_ACUITY_SCORE: 47
ADLS_ACUITY_SCORE: 47
ADLS_ACUITY_SCORE: 42
ADLS_ACUITY_SCORE: 46
ADLS_ACUITY_SCORE: 41
ADLS_ACUITY_SCORE: 41
ADLS_ACUITY_SCORE: 35
HEARING_DIFFICULTY_OR_DEAF: NO
ADLS_ACUITY_SCORE: 56
ADLS_ACUITY_SCORE: 41
ADLS_ACUITY_SCORE: 35
ADLS_ACUITY_SCORE: 47
ADLS_ACUITY_SCORE: 47
ADLS_ACUITY_SCORE: 50
TRANSFERRING: 1-->ASSISTANCE (EQUIPMENT/PERSON) NEEDED
ADLS_ACUITY_SCORE: 52
ADLS_ACUITY_SCORE: 41
EQUIPMENT_CURRENTLY_USED_AT_HOME: WALKER, STANDARD
ADLS_ACUITY_SCORE: 43
ADLS_ACUITY_SCORE: 49
ADLS_ACUITY_SCORE: 48
DOING_ERRANDS_INDEPENDENTLY_DIFFICULTY: YES
WEAR_GLASSES_OR_BLIND: NO
ADLS_ACUITY_SCORE: 49
ADLS_ACUITY_SCORE: 46
WALKING_OR_CLIMBING_STAIRS: AMBULATION DIFFICULTY, ASSISTANCE 1 PERSON;AMBULATION DIFFICULTY, REQUIRES EQUIPMENT
ADLS_ACUITY_SCORE: 41
TOILETING: 1-->ASSISTANCE (EQUIPMENT/PERSON) NEEDED (NOT DEVELOPMENTALLY APPROPRIATE)
ADLS_ACUITY_SCORE: 47
ADLS_ACUITY_SCORE: 44
ADLS_ACUITY_SCORE: 50
ADLS_ACUITY_SCORE: 50
ADLS_ACUITY_SCORE: 35
ADLS_ACUITY_SCORE: 47
ADLS_ACUITY_SCORE: 42
ADLS_ACUITY_SCORE: 47
ADLS_ACUITY_SCORE: 46
ADLS_ACUITY_SCORE: 47
ADLS_ACUITY_SCORE: 52
ADLS_ACUITY_SCORE: 37
ADLS_ACUITY_SCORE: 47
ADLS_ACUITY_SCORE: 35
ADLS_ACUITY_SCORE: 47
TRANSFERRING: 1-->ASSISTANCE (EQUIPMENT/PERSON) NEEDED
ADLS_ACUITY_SCORE: 47
ADLS_ACUITY_SCORE: 49
ADLS_ACUITY_SCORE: 46
ADLS_ACUITY_SCORE: 46
ADLS_ACUITY_SCORE: 54
ADLS_ACUITY_SCORE: 54
ADLS_ACUITY_SCORE: 48
DRESSING/BATHING_DIFFICULTY: YES
ADLS_ACUITY_SCORE: 56
ADLS_ACUITY_SCORE: 49
ADLS_ACUITY_SCORE: 41
ADLS_ACUITY_SCORE: 35
ADLS_ACUITY_SCORE: 46
ADLS_ACUITY_SCORE: 47
ADLS_ACUITY_SCORE: 41
ADLS_ACUITY_SCORE: 54
ADLS_ACUITY_SCORE: 45
ADLS_ACUITY_SCORE: 50
ADLS_ACUITY_SCORE: 47
ADLS_ACUITY_SCORE: 41
ADLS_ACUITY_SCORE: 45
ADLS_ACUITY_SCORE: 42
ADLS_ACUITY_SCORE: 47
ADLS_ACUITY_SCORE: 46
ADLS_ACUITY_SCORE: 47
CHANGE_IN_FUNCTIONAL_STATUS_SINCE_ONSET_OF_CURRENT_ILLNESS/INJURY: YES
ADLS_ACUITY_SCORE: 41
ADLS_ACUITY_SCORE: 47

## 2022-10-26 PROBLEM — K74.60 CIRRHOSIS OF LIVER WITH ASCITES, UNSPECIFIED HEPATIC CIRRHOSIS TYPE (H): Status: ACTIVE | Noted: 2022-01-01

## 2022-10-26 PROBLEM — R18.8 CIRRHOSIS OF LIVER WITH ASCITES, UNSPECIFIED HEPATIC CIRRHOSIS TYPE (H): Status: ACTIVE | Noted: 2022-01-01

## 2022-10-26 NOTE — ED PROVIDER NOTES
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     HPI  Maria Guadalupe Betancourt is a 67 year old female with a past medical history significant for cirrhosis liver, DM II insulin dependent, schizophrenia, stroke, hepatitis C, poly substance abuse, severe malnutrition and left femoral neck fracture due to mechanical fall status post intramedullary nailing on 6/13 s/p replacement of the elan on 7/22 and s/p total hip replacement on 7/22/21 who presents to the Emergency Department for evaluation of leg swelling. Patient is present with family. Granddaughter states that patient has history with gallbladder cancer April 2022, liver cirrhosis, hepatitis C and was placed on Hospice. She states that patient wants to be off hospice and be treated for Jaundice and leg swelling. She states that patient was placed on hospice due to her overall history combined is likely that her overall performance will worsen postsurgery. Patient granddaughter states patient's yellow jaundice has been getting worse since a week ago. She states patient has been having swollen legs since 2 weeks ago. Patient also reports abdominal pain from past cholecystectomy. Granddaughter reports loss stool since three days ago. No fever or vomiting.    Per chart review, Patient was found to have cholecystitis and underwent laparoscopic cholecystectomy. 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     Past Medical History  Past Medical History:   Diagnosis Date     Cirrhosis (H)      CVA (cerebral vascular accident) (H)      Encounter for  "other orthopedic aftercare     Multiple bone fractures in the past     Hepatitis C      HSV (herpes simplex virus) anogenital infection     Ocular     Lung abscess (H)      Schizophrenia (H)      Seizure (H)      Syncope and collapse      Type 2 diabetes mellitus (H)      Past Surgical History:   Procedure Laterality Date     ------------OTHER-------------      Left wrist surgery     insulin glargine (LANTUS SOLOSTAR) 100 UNIT/ML pen  metFORMIN (GLUCOPHAGE) 500 MG tablet  OLANZapine zydis (ZYPREXA) 5 MG ODT      Allergies   Allergen Reactions     Sulfur      Family History  No family history on file.  Social History   Social History     Tobacco Use     Smoking status: Some Days   Substance Use Topics     Alcohol use: Not Currently      Past medical history, past surgical history, medications, allergies, family history, and social history were reviewed with the patient. No additional pertinent items.       Review of Systems ROS: 14 point ROS neg other than the symptoms noted above in the HPI.  A complete review of systems was performed with pertinent positives and negatives noted in the HPI, and all other systems negative.    Physical Exam   BP: (!) 176/98  Pulse: 94  Temp: 99.1  F (37.3  C)  Resp: 16  Height: 162.6 cm (5' 4\")  Weight: 51.6 kg (113 lb 12.8 oz)  SpO2: 99 %  Physical Exam  Physical Exam   Constitutional: Oriented to person place, jaundice.  HENT:   Head: Normocephalic and atraumatic. Icteric eyes.  Neck: Normal range of motion. No nuchal rigidity.  Pulmonary/Chest: Effort normal. No respiratory distress. Clear lungs bilaterally.  Cardiac: No murmurs, rubs, gallops. RRR.  Abdominal: Protuberant abdomen without abdominal tenderness..  MSK: Long bones without deformity or evidence of trauma.  Bilateral lower extremity pitting edema.  Neurological: alert and oriented to person.  No asterixis.  Moving all extremities.  Pupils equal and reactive bilaterally.  Skin: Skin is warm and dry. " Jaundiced  Psychiatric:  normal mood and affect.  behavior is normal. Thought content normal.     ED Course      Procedures                No results found for any visits on 10/25/22.  Medications - No data to display     Assessments & Plan (with Medical Decision Making)   MDM   patient presenting with leg swelling.  She is on hospice but they just revoked this today to come here due to the leg swelling mainly at seems.  Had a long discussion regarding goals of care with patient and family, discussed likely extensive time to be spent in hospital if they are not in hospice due to overall patient poor condition and prognosis however they elected to go forth with revoking hospice.  Suspect will need further goals of care discussion.  No signs or symptoms of SBP.  Abdominal ultrasound without acute process.  Labs show decompensated cirrhosis.  Patient mated to medicine for further care.    I have reviewed the nursing notes. I have reviewed the findings, diagnosis, plan and need for follow up with the patient.    New Prescriptions    No medications on file       Final diagnoses:   Cirrhosis of liver with ascites, unspecified hepatic cirrhosis type (H)   I, Jessica Thao am serving as a trained medical scribe to document services personally performed by Dutch Crane MD, based on the provider's statements to me.      I,  Dutch Crane MD, was physically present and have reviewed and verified the accuracy of this note documented by Jessica Thao.      --  Dutch Crane MD  [facility] EMERGENCY DEPARTMENT  10/25/2022     Dutch Crane MD  10/26/22 0609

## 2022-10-26 NOTE — PROGRESS NOTES
Brief progress note     Patient was admitted earlier today, I reviewed the chart and examined the patient.   She is waiting for a transfer to [place], she needs to be seen by GI/Hepatology.   Pt is having very significant juandice and peripheral edema. MELD score 28.   Continue following the patient. I confirmed with the patient that she doesn't want to be on Hospice and wants to get treatment for decompensated cirrhosis.

## 2022-10-26 NOTE — PROGRESS NOTES
Care Management Follow Up    Length of Stay (days): 0    Expected Discharge Date: 10/28/2022     Concerns to be Addressed:    F/u with [place] JUANJO MOSES    Patient plan of care discussed at interdisciplinary rounds: No    Anticipated Discharge Disposition:  TBD     Anticipated Discharge Services:  TBD  Anticipated Discharge DME:  TBD    Patient/family educated on Medicare website which has current facility and service quality ratings:  N/A  Education Provided on the Discharge Plan:  N/A  Patient/Family in Agreement with the Plan:  N/A    Referrals Placed by CM/SW:    Private pay costs discussed: Not applicable    Additional Information:  SW received message from CM/SW team that pt's [facility]  Cathryn Perez (ph: [phone]) left message requesting f/u from SW working with pt and requested SW f/u.     JOSE MIGUEL called JUANJO Lockett and spoke with her. Cathryn shared that pt is on CADI wavier and came from [facility] in [place] and pt was on hospice PTA. Cathryn reported that pt has two HCD Agents who she believes is pt's granddaughter and niece but reported that they do not have the best communication with [facility] or Cathryn. Cathryn reported that pt's family took her from [facility] over the weekend and did not bring pt back to [facility] reporting that they do not want pt on hospice or at that the [facility] anymore. Cathryn noted that family believes [place] is too far away from pt's family. Cathryn reported that per [facility], pt has been on hospice and requires total cares right now. Pt does have a bed hold at [facility] at this time and can go back there as of now. TBD on what discharge plan will be. Cathryn would like to work with SW on discharge plan, especially if pt needs higher level of care or new [facility] as CADI wavier pays for services at [facility]. SW provided Cathryn with [facility] SW phone as it appears pt will be transferring there.     SW to continue to follow and assist with discharge plan as appropriate.      Marla Kessler JOSE MIGUEL  [facility]   [place]  [facility]

## 2022-10-26 NOTE — ED NOTES
Shari Patel ([phone]) with [facility] calling for information about client. Client states she doesn't know a shari and does not want to speak with them.

## 2022-10-26 NOTE — H&P
[facility], [place]    History and Physical - Hospitalist Service, GOLD TEAM 17       Date of Admission:  10/25/2022    Assessment & Plan      Maria Guadalupe Betancourt is a 67 year old female admitted on 10/25/2022 for LE swelling in setting of cirrhosis and known gallbladder cancer, not currently on treatment. She was on hospice prior to arrival to ED, but per conversation with ED provider and family, family would like to revoke hospice status to pursue goal-directed therapy for her underlying conditions.     Goals of care  - Will need to further discuss with patient's family today to determine next steps in treatment plan  - Consider palliative care consult    LE swelling  Cirrhosis  MELD-Na score: 28 at 10/25/2022 11:39 PM  MELD score: 28 at 10/25/2022 11:39 PM  Calculated from:  Serum Creatinine: 0.63 mg/dL (Using min of 1 mg/dL) at 10/25/2022 11:39 PM  Serum Sodium: 139 mmol/L (Using max of 137 mmol/L) at 10/25/2022 11:39 PM  Total Bilirubin: 21.0 mg/dL at 10/25/2022 11:39 PM  INR(ratio): 2.42 at 10/25/2022 11:39 PM  Age: 67 years  - Can consider diuresis if K improved pending discussion with family  - Consider diagnostic para if concern for SBP  - Daily MELD labs    Gallbladder cancer  Intrahepatic and extrahepatic biliary duct dilatation  - Can consider oncology consult pending discussion with family, but concerned that patient's comorbidities and performance status may limit treatment options  - Consider palliative care consult   - Consider MRCP with potential GI consult pending GOC discussions    T2DM  - Will need to confirm home meds with family/pharmacy  - Sliding scale insulin ordered for now    Schizophrenia  - Will need to confirm home meds with family/pharmacy       Diet: Combination Diet Regular Diet Adult    DVT Prophylaxis: Pneumatic Compression Devices  Hunter Catheter: Not present  Central Lines: None  Cardiac Monitoring: None  Code Status:  Unknown, will need to  confirm with patient and family    Clinically Significant Risk Factors Present on Admission        # Hypokalemia: Lowest K = 3.2 mmol/L (Ref range: 3.4-5.3) in last 2 days, will replace as needed       # Hypoalbuminemia: Lowest albumin = 1.9 g/dL (Ref range: 3.5-5.2) at 10/25/2022 11:39 PM, will monitor as appropriate  # Coagulation Defect: INR = 2.42 (Ref range: 0.85 - 1.15) and/or PTT = N/A, will monitor for bleeding                Disposition Plan      Expected Discharge Date: 10/28/2022                The patient's care was discussed with the Patient.    Jessa Worley MD  Hospitalist Service, [address]  Securely message with the Vocera Web Console (learn more here)  Text page via [facility] Paging/Directory   Please see signed in provider for up to date coverage information      ______________________________________________________________________    Chief Complaint   LE swelling     History is obtained mostly from the electronic health record. No family at bedside during encounter and patient would not answer questions.     History of Present Illness   Maria Guadalupe Betancourt is a 67 year old female with PMHx of cirrhosis liver, DM II insulin dependent, schizophrenia, stroke, hepatitis C, poly substance abuse, severe malnutrition and left femoral neck fracture due to mechanical fall status post intramedullary nailing on 6/13 s/p replacement of the elan on 7/22 and s/p total hip replacement on 7/22/21 who presents for LE swelling and jaundice.     Patient was diagnosed with gallbladder cancer in April 2022 after laparoscopic cholecystectomy for acute cholecystitis. At that time, she was also found to have cirrhosis of liver. Final pathology noted T3 gallbladder cancer - margins were negative, but no nodes were resected and tumor came close to gallbladder fossa without any obvious invasion into hepatic parenchyma (per oncology note dated 7/25/2022). She was  seen by Dr. Sean Rocha (hepatobiliary and pancreatic surgery) on 7/25/2022. At that visit, Dr. Rocha noted that ideally she would get compllete radical cholecystectomy and lymphadenectomy with reevaluation of cystic duct margin. However, given her multiple comorbidities, surgical intervention was not offered and she was advised to seek out chemoradiation vs systemic chemotherapy under direction of her primary oncologist.     Per chart review and ED provider (who spoke to family), patient was placed on hospice due to overall condition and poor performance status. However, she has had worsening jaundice and LE swelling, so family would like patient taken off of hospice and treatment pursued.     Family was not at bedside during my visit with patient and patient was not amenable to answering questions during encounter.     In the ED, patient's vitals were stable. Her labs were significant for K 3.2, Cr 0.63 (recent baseline seems to be 0.5-0.8), alk phos 448, total bili 21, BNP 2,551, INR 2.42, U/A with moderate blood, trace LE, positive nitrite. Urine culture pending. Bilateral LE doppler USG negative for DVT. RUQ USG revealed intrahepatic and extrahepatic biliary dilatation. CXR revealed low lung volumes and findings likely consistent with atelectasis.     Patient was admitted for ongoing management with plan for transfer to [place] for further subspecialty care.     Review of Systems    Review of systems not obtained due to patient factors - lack of cooperation and patient's refusal and no family at bedside.    Past Medical History    I have reviewed this patient's medical history and updated it with pertinent information if needed.   Past Medical History:   Diagnosis Date     Cirrhosis (H)      CVA (cerebral vascular accident) (H)      Encounter for other orthopedic aftercare     Multiple bone fractures in the past     Hepatitis C      HSV (herpes simplex virus) anogenital infection     Ocular     Lung  abscess (H)      Schizophrenia (H)      Seizure (H)      Syncope and collapse      Type 2 diabetes mellitus (H)      Past Surgical History   I have reviewed this patient's surgical history and updated it with pertinent information if needed.  Past Surgical History:   Procedure Laterality Date     ------------OTHER-------------      Left wrist surgery     Social History   I have reviewed this patient's social history and updated it with pertinent information if needed.  Social History     Tobacco Use     Smoking status: Some Days   Substance Use Topics     Alcohol use: Not Currently       Family History     Unable to obtain due to: patient not answering questions, no family present at bedside    Prior to Admission Medications   Prior to Admission Medications   Prescriptions Last Dose Informant Patient Reported? Taking?   OLANZapine zydis (ZYPREXA) 5 MG ODT   No No   Sig: Take 1 tablet (5 mg) by mouth 2 times daily   insulin glargine (LANTUS SOLOSTAR) 100 UNIT/ML pen   No No   Sig: Inject 20 Units Subcutaneous At Bedtime Lantus Solostar Pen   metFORMIN (GLUCOPHAGE) 500 MG tablet   No No   Sig: Take 1 tablet (500 mg) by mouth 2 times daily (with meals)      Facility-Administered Medications: None     Allergies   Allergies   Allergen Reactions     Sulfur      Physical Exam   Vital Signs: Temp: 98  F (36.7  C) Temp src: Axillary BP: (!) 170/87 Pulse: 88   Resp: 16 SpO2: 100 % O2 Device: None (Room air)    Weight: 113 lbs 12.8 oz   Physical Exam  Constitutional:       General: She is not in acute distress.     Appearance: She is ill-appearing (chronically ill-appearing). She is not toxic-appearing.      Comments: Sleeping, occasionally opened eyes to name/voice, did not reliably answer questions with words, just grunted   HENT:      Head: Normocephalic and atraumatic.      Right Ear: External ear normal.      Left Ear: External ear normal.      Nose: Nose normal.      Mouth/Throat:      Mouth: Mucous membranes are moist.    Eyes:      General: Scleral icterus present.         Right eye: No discharge.         Left eye: No discharge.      Extraocular Movements: Extraocular movements intact.      Conjunctiva/sclera: Conjunctivae normal.   Cardiovascular:      Rate and Rhythm: Normal rate and regular rhythm.      Heart sounds: Normal heart sounds.   Pulmonary:      Effort: Pulmonary effort is normal.      Breath sounds: Normal breath sounds.   Abdominal:      General: Bowel sounds are normal. There is distension.      Palpations: Abdomen is soft.      Tenderness: There is no abdominal tenderness.   Musculoskeletal:         General: Normal range of motion.      Cervical back: Normal range of motion and neck supple.      Right lower leg: Edema present.      Left lower leg: Edema present.   Lymphadenopathy:      Cervical: No cervical adenopathy.   Skin:     General: Skin is warm and dry.      Capillary Refill: Capillary refill takes less than 2 seconds.      Findings: No rash.   Neurological:      Comments: Unable to assess orientation due to patient not answering questions       Data   Data reviewed today: I reviewed all medications, new labs and imaging results over the last 24 hours.

## 2022-10-26 NOTE — ED TRIAGE NOTES
Pt has been dx with Gallbladder cancer March 2022, liver cirrhosis, Hep C, pt was placed at an Assisted living in [place], pt was placed on Hospice, but per her POA pt does not want to be on hospice anymore, wants to be treated on her jaundice and leg swelling.     Triage Assessment     Row Name 10/25/22 2120       Triage Assessment (Adult)    Airway WDL WDL       Respiratory WDL    Respiratory WDL WDL       Skin Circulation/Temperature WDL    Skin Circulation/Temperature WDL X  jaundiced       Cardiac WDL    Cardiac WDL WDL       Peripheral/Neurovascular WDL    Peripheral Neurovascular WDL WDL

## 2022-10-27 NOTE — PROGRESS NOTES
Admission/Transfer from: ED  2 RN skin assessment completed. NO; Pt refused  Significant findings include: NA  WOC Nurse Consult Ordered? NO

## 2022-10-27 NOTE — CONSULTS
Patient is on IR schedule 10/27/2022 for a diagnostic paracentesis.  Patient has decompensated cirrhosis with concern for spontaneous bacterial peritonitis.  Patient has small volume ascites more appropriate for interventional radiology sampling.  Diagnostic orders to be placed by the medicine team.   Labs WNL for procedure.    No NPO required.  Medications to be held include: none  Consent will be done prior to procedure.      Please contact IR charge nurse at [phone] for estimated time of procedure.     Case discussed with IR staff Dr. Jo and requesting provider Dr. Perez.    Mamadou Canas PA-C  Interventional Radiology  Phone: [phone]  Pager: [phone]

## 2022-10-27 NOTE — PROGRESS NOTES
"[facility]    Medicine Progress Note - Hospitalist Service, GOLD TEAM 4    Date of Admission:  10/25/2022    Assessment & Plan    Maria Guadalupe Betancourt is a 67 year old female with PMHx of gallbladder cancer, hepatitis C cirrhosis, cognitive impairment, anxiety and schizophrenia. Patient reportedly on hospice since 2 weeks ago, however, was brought to the ED by her granddaughter reporting the patient no longer wanted to be on hospice cares.     # Goals of care   She was brought to the ED the evening of 10/26 by her granddaughter and Jamal CARRIZALES. Today, 10/27, I made multiple attempts to contact patient's support including Jamal (x3 w/out response), Son Joseph (did reach him but he is not actively involved with the patient) and listed , Kalpana (who claims to have no knowledge of the patient. On my 4th call this evening ~1700 I was able to contact Jamal and discussed case for about 30 minutes.    Maria Guadalupe was reportedly placed into hospice about 2 weeks prior to arrival and has been residing at [facility]. POA decided to bring the patient to her house 4 days PTA so that the patient could be \"closer to family\" while the POA has been looking into AL facilities closer to home. During this time, the pt developed further abdominal distension, LE swelling, and abdominal pain so POA brought the patient to the ED. The pt has reportedly been making statements of wanting to come off of hospice and thus her POA revoked hospice on arrival to the ER. Per Jamal (SOFIE), she knows her grandmother is declining and does not expect restorative treatment. However, she was hopeful to have pain controlled, anxiety controlled, jaundice, and LE swelling addressed and thus brought he to the hospital. Jamal reports she works full time and cannot provide care for the pt at home and neither can any other family members.   - Care was discussed today with multiple " specialties including oncology, IR, GI, palliative, and social work   - Due to us having limited records of the patient, including no med list, and patient reportedly being on hospice, care team was lacking guidance on course of care throughout majority of the day.   - Consulted pharmacy for med rec   - I did order anxiolytics per POAs reported med list  - Ordered IV Dialudid 0.5 mg q2 PRN for pain control   - POA plans to come to the hospital tomorrow (10/28) to discuss are options    - Hopeful to arrange care conference on 10/28  - SW consulted to assist with care conference and eventual discharge planning   - Overall, patient likely to be difficult discharge if restorative cares are sought, regardless of degree. Will discus further options with care team tomorrow and     # Acute confusion/Delerium   # Hepatic encephalopathy, suspected    # Gallbladder cancer  # Intrahepatic and extrahepatic biliary duct dilatation  # Ascities   See goals of care discussion above. Overall, pt has been confused throughout the day and declining cares from multiple specialty teams.    - IR consulted for paracentesis - patient refused this    - Will try to repeat tomorrow pending GOC discussion to r/o SBP   - Oncology input appreciated    - No further diagnostics or therapeutics recommended    - Will be available for care conference   - Palliative care consulted   - GI consulted    - I ordered CT abdomen/Pelvis w/ contrast    - Consider ERCP pending results and if pt consentable   - Continue 1:1 sitter due to confusion     # Hypertensive urgency   Likely acute pain contributing. BP up to 200/95  - IV Hydralazine 10 mg q 4 PRN for SBP > 180 or DBP >110  - Starting Anxiolytics and pain control hopefully will help curb BP   - Consider starting anti-hypertensive agent tomorrow pending goals of care     # UTI  UA positive in the ED. Treating UTI may help confusion   - UCx with Gram(-) Bacilli  - IV Ceftriaxone 1x daily x 5 days. Will  de-escalate pending cultures     # Cirrhosis   # Hepatitis C   Cirrhosis  MELD-Na score: 28 at 10/25/2022 11:39 PM  MELD score: 28 at 10/25/2022 11:39 PM  Calculated from:  Serum Creatinine: 0.63 mg/dL (Using min of 1 mg/dL) at 10/25/2022 11:39 PM  Serum Sodium: 139 mmol/L (Using max of 137 mmol/L) at 10/25/2022 11:39 PM  Total Bilirubin: 21.0 mg/dL at 10/25/2022 11:39 PM  INR(ratio): 2.42 at 10/25/2022 11:39 PM  Age: 67 years   - Pt refused para today   - Consider diagnostic para if concern for SBP  - Daily MELD labs      # Anxiety   Pt reports anxiety as being a main determent to the patient's comfort. Was reportedly on Trazadone, gabapentin, clonazepam and ativan prior to arrival, per POA  - Pharmacy for med rec   - Ordered Trazadone 50 mg at bedtime   - Ordered PO ativan 0.5 mg q 4 PRN for anxiety     #T2DM  Per POA was previously on insulin but this was stopped with transition to hospice   - Sliding scale insulin   - Pharmacy consult ordered for med rec  - Would favor against starting a new long-term diabetic medication until goals of care determined      # Schizophrenia  - pharmacy consulted for prior med rec         Diet: Combination Diet Regular Diet Adult  NPO for Medical/Clinical Reasons Except for: Meds, Ice Chips    DVT Prophylaxis: Pneumatic Compression Devices  Hunter Catheter: Not present  Central Lines: None  Cardiac Monitoring: None  Code Status: Full Code      Disposition Plan     Expected Discharge Date: 10/28/2022                The patient's care was discussed with the Attending Physician, Dr. Perez, Bedside Nurse, Care Coordinator/, Patient, Patient's Family and IR, GI, Palliative, oncology, and social work Consultant          Total Visit Time: 120 minutes  o For Outpatient, 'Total Visit Time' = Face-to-Face time only.  o For Inpatient, 'Total Visit Time' = Unit Floor + Face-to-Face time.    Total Face-to-Face Prolonged Service Time: 20 minutes with patient and 30+ minutes  "discussing case with patient's family. 40 minutes + in discussion with multiple specialties. 30+ minutes in chart review, orders and documentation     Content of the Prolonged Time: Extensive discussion of care with family deciphering care to date, discussion with multiple specialties, orders and documentation          Camilo Steinberg PA-C  Hospitalist Service, GOLD TEAM [address] [place] [address]  Securely message with the Vocera Web Console (learn more here)  Text page via [facility] Paging/Directory   Please see signed in provider for up to date coverage information      Clinically Significant Risk Factors        # Hypokalemia: Lowest K = 3.2 mmol/L (Ref range: 3.4-5.3) in last 2 days, will replace as needed       # Hypoalbuminemia: Lowest albumin = 1.9 g/dL (Ref range: 3.5-5.2) at 10/25/2022 11:39 PM, will monitor as appropriate           # Moderate Malnutrition: based on nutrition assessment, PRESENT ON ADMISSION       ______________________________________________________________________    Interval History   Patient acutely confused on my exam. States \"no\" to most questions asked. Says \"yes\" to being able to talk to her granddaughter     Data reviewed today: I reviewed all medications, new labs and imaging results over the last 24 hours. Please see discussion of these results in the A/P.    Physical Exam     Vital Signs: Temp: 98.1  F (36.7  C) Temp src: Oral BP: (!) 200/95 Pulse: 90     Resp: 18 SpO2: 97 % O2 Device: None (Room air)    Weight: 113 lbs 12.8 oz    Constitutional: Delirious. Confused.   Eyes: EOM, PERRLA. + icterus   HENT: Normocephalic, without obvious abnormality, atraumatic.   Respiratory: No increased work of breathing.    Cardiovascular: RRR. No murmur or friction rub. No edema. No chest wall tenderness.  GI: Soft, somewhat distended and with mild tenderness Bowel sounds are active.   Skin: Diffuse jaundice   Musculoskeletal:  Full range of motion noted.  "   Neurologic: Cranial nerves II-XII are grossly intact.     Data   Recent Labs   Lab 10/26/22  2133 10/26/22  1726 10/26/22  1148 10/26/22  0830 10/25/22  2339   WBC  --   --   --   --  6.5   HGB  --   --   --   --  9.5*   MCV  --   --   --   --  95   PLT  --   --   --   --  320   INR  --   --   --   --  2.42*   NA  --   --   --   --  139   POTASSIUM  --   --   --   --  3.2*   CHLORIDE  --   --   --   --  105   CO2  --   --   --   --  25   BUN  --   --   --   --  12   CR  --   --   --   --  0.63   ANIONGAP  --   --   --   --  9   LAKESHA  --   --   --   --  8.4*   * 185* 192*   < > 195*   ALBUMIN  --   --   --   --  1.9*   PROTTOTAL  --   --   --   --  7.0   BILITOTAL  --   --   --   --  21.0*   ALKPHOS  --   --   --   --  448*   ALT  --   --   --   --  25    < > = values in this interval not displayed.       No results found for this or any previous visit (from the past 24 hour(s)).

## 2022-10-27 NOTE — CONSULTS
Medical Oncology  Consult Note   Date of Service: 10/27/2022    Patient: Maria Guadalupe Betancourt  MRN: [id number]  Admission Date: 10/25/2022  Hospital Day # 1    Reason for Consult: Consideration of systemic treatment for Gall Bladder cancer     HISTORY OF PRESENTING ILLNESS:      Maria Guadalupe Betancourt is a 67 year old female with a history of hep C cirrhosis and multiple medical comorbidities who presented to the hospital yesterday from her nursing home with symptoms of worsening abdominal distention, pain and worsening lower extremity swelling as well as jaundice.    History is difficult to obtain, as patient has inconsistent affect, unclear comprehension and not able to provide a history.  No surrogate medical decision makers or family available at bedside or over phone.  Of history obtained from chart review and from other moderate medical providers.    In brief, Ms. Hinton was found to have cholecystitis in April 2022 underwent a laparoscopic cholecystectomy at [facility].  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  However no nodes were resected or sampled during surgery.  It is reported that due to logistical challenges, patient finally followed up with surgical oncologist in July 22 (Dr. Rocha at [facility]). CA 19-9 at that time was 18, CT Abd (7/25/22) did not show obvious signs of metastatic disease. At that time she was noted to be in a wheelchair and with functional status ECOG 2.  From his note (see care everywhere, 7/25/2022)- they discussed the recommended surgical approach of radical cholecystectomy and lymphadenectomy with reevaluation of cystic duct margin.  However it was felt that given her significant comorbidity of decompensated liver cirrhosis with ascites and prior encephalopathy, and poor functional status-that aggressive surgery would not be in her best interest.  She subsequently reportedly discussed systemic treatment with  chemotherapy or chemo RT with medical oncologist Dr. Martini ([facility]) but eventually decided to go on hospice.  She has been on hospice at a skilled nursing facility for last 2 months.  It appears that yesterday, patient's family was alarmed by her symptoms of lower extremity swelling and jaundice and brought her into the ER, requesting symptom directed care.  Labs revealed ALP elevated at 448, total bilirubin of 21, albumin of 1.9.  Ultrasound abdomen showed CBD dilation at 17 mm.    Similar to encounters with other providers during this hospitalization, patient declined to engage with oncology team.  Did respond to a few objective, leading questions.  Reported pain in her abdomen.  Reported swelling in her legs.  Did not respond to much else.      Review of Systems:  A comprehensive ROS was performed and found to be negative or non-contributory with the exception of that noted in the HPI above.    PAST MEDICAL HISTORY      Past Medical History:   Diagnosis Date     Cirrhosis (H)      CVA (cerebral vascular accident) (H)      Encounter for other orthopedic aftercare     Multiple bone fractures in the past     Hepatitis C      HSV (herpes simplex virus) anogenital infection     Ocular     Lung abscess (H)      Schizophrenia (H)      Seizure (H)      Syncope and collapse      Type 2 diabetes mellitus (H)        PAST SURGICAL HISTORY:      Past Surgical History:   Procedure Laterality Date     ------------OTHER-------------      Left wrist surgery       SOCIAL HISTORY:      Social History     Socioeconomic History     Marital status: Single   Tobacco Use     Smoking status: Some Days   Substance and Sexual Activity     Alcohol use: Not Currently     Sexual activity: Not Currently        FAMILY HISTORY:      Unable to obtain    MEDICATIONS:      No current facility-administered medications on file prior to encounter.  insulin glargine (LANTUS SOLOSTAR) 100 UNIT/ML pen, Inject 20 Units Subcutaneous At Bedtime  "Harishsixtous Bacaostar Pen  metFORMIN (GLUCOPHAGE) 500 MG tablet, Take 1 tablet (500 mg) by mouth 2 times daily (with meals)  OLANZapine zydis (ZYPREXA) 5 MG ODT, Take 1 tablet (5 mg) by mouth 2 times daily         PHYSICAL EXAM:      Blood pressure (!) 190/92, pulse 90, temperature 98.1  F (36.7  C), temperature source Oral, resp. rate 15, height 1.626 m (5' 4\"), weight 51.6 kg (113 lb 12.8 oz), SpO2 99 %.    General: Frail-appearing, elderly female looking much older than stated age, sitting curled up in bed, occasionally moaning, awake but slightly drowsy, disoriented,  HEENT: Icteric sclera, pale conjunctiva  Neck: Not examined-patient refused  CV: Not examined-patient refused  Resp: Not examined-patient refused, grossly does not appear dyspneic, not seeing use of accessory muscles, somewhat symmetrical chest rise   GI: soft, distended, abdominal skin grossly icteric, no caput medusae, ascites+  MSK: Very little subcutaneous fat, significant diffuse muscle wasting  Skin: Grossly icteric      LABS:    I personally reviewed the following studies:  ROUTINE LABS (Last four results):  CBC  Recent Labs   Lab 10/25/22  2339   WBC 6.5   RBC 2.94*   HGB 9.5*   HCT 28.0*   MCV 95   MCH 32.3   MCHC 33.9   RDW 19.9*        CMP  Recent Labs   Lab 10/26/22  2133 10/26/22  1726 10/26/22  1148 10/26/22  0830 10/25/22  2339   NA  --   --   --   --  139   POTASSIUM  --   --   --   --  3.2*   CHLORIDE  --   --   --   --  105   CO2  --   --   --   --  25   ANIONGAP  --   --   --   --  9   * 185* 192* 200* 195*   BUN  --   --   --   --  12   CR  --   --   --   --  0.63   GFRESTIMATED  --   --   --   --  >90   LAKESHA  --   --   --   --  8.4*   PROTTOTAL  --   --   --   --  7.0   ALBUMIN  --   --   --   --  1.9*   BILITOTAL  --   --   --   --  21.0*   ALKPHOS  --   --   --   --  448*   ALT  --   --   --   --  25       IMAGING:   The following imaging results were reviewed by me -     US Abdomen (10/26/22):  IMPRESSION:  1.  Mild " coarsened hepatic echotexture which can be seen with chronic underlying hepatic parenchymal disease. Recommend correlation with liver function studies.  2.  Patent portal vein.  3.  Intrahepatic and extrahepatic biliary dilatation. Recommend correlation for biliary obstruction consideration of MRCP to further evaluate the common bile duct and the pancreatic head.  4.  Minimal ascites surrounding the liver and right upper quadrant.      PATHOLOGY:     Case Report  Anatomic Pathology Consultation                   Case: [id number]                                   Authorizing Provider:  Sean Rocha,   Collected:           07/06/2022 1057                                      [facility]                                                                         Ordering Location:     [facility]       Received:            07/06/2022 1058                                      [facility]                                                       Pathologist:           Geovanny Torres IV, MD                                                                       Specimen:    Other, [facility] [id number]                                             Final Diagnosis  A) GALLBLADDER, CHOLECYSTECTOMY ([id number] OF 4/20/22):   1. Moderately differentiated gallbladder adenocarcinoma, see synoptic section for details    Electronically signed by Geovanny Torres IV, MD on 7/6/2022 at  4:48 PM   Clinical Information  Ms. Betancourt is a 66 y.o. who underwent cholecystectomy on 4/20/2022 at [facility].    Gross Description  Received from [facility] are 10 glass slides labeled [id number].    Microscopic Description  The final diagnosis is based on microscopic examination of appropriate sections of all specimens.    SYNOPTIC REPORTING  GALLBLADDER    Gallbladder - All Specimens   8th Edition - Protocol posted: 2/26/2020     SPECIMEN      Procedure:    Simple cholecystectomy (laparoscopic or open)     TUMOR      Tumor Site:    Body      Histologic Type:    Adenocarcinoma, biliary type      Histologic Grade:    G2: Moderately differentiated      Tumor Size:    Greatest Dimension (Centimeters): 2.4 cm      Tumor Extension:    Tumor perforates serosa (visceral peritoneum)      Lymphovascular Invasion:    Not identified      Perineural Invasion:    Present     MARGINS      Cystic Duct Margin:    Uninvolved by invasive carcinoma and high-grade intraepithelial neoplasia        Distance of Invasive Carcinoma from Margin:    greater than 1 cm      Liver Parenchymal Margin:    Uninvolved by invasive carcinoma        Distance of Invasive Carcinoma from Margin:    less than 0.1 cm     LYMPH NODES      Regional Lymph Nodes:    No lymph nodes submitted or found     PATHOLOGIC STAGE CLASSIFICATION (pTNM, AJCC 8th Edition)           Primary Tumor (pT):    pT3      Regional Lymph Nodes (pN):    pNX     ADDITIONAL FINDINGS      Additional Findings:    Cholelithiasis      Additional Findings:    Chronic cholecystitis      Additional Findings:    Proximal nodule is a benign inflammatory mass            ASSESSMENT AND PLAN:      Maria Guadalupe Betancourt is a 67 year old female with history of Hep C cirrhosis of the liver (decompensated) and multiple medical comorbidities with a diagnosis of locally-advanced gall bladder cancer in Apr 2022, without further cancer-directed treatment (either surgical or chemotherapy or RT) who transitioned to SNF-based hospice care 2 months ago, who is now admitted with symptoms of worsening anasarca with request by family for further treatments.     This is a challenging situation. From review of prior records which is primarily office notes from Surg Onc at [facility] (in July) as we do not have access yet to her medical oncology notes (Dr. Martini, [facility]), it appears that Ms. Betancourt had locally advanced GB cancer atleast as of 7/25/22 with prior removal of GB for presumed cholecystitis in July. A CT in 7/25 was reported not have evidence of distant mets. She did not appear to have biliary obstruction at that time. Definitve surgery was felt to be not favorable in terms or risks/benfits given her poor functional status and burden of significant liver disease. It is likely she was not a candidate for systemic chemotherapy or radiation either for the same reasons.     Currently, patient does not appear, to us, to have medical decision making ability and is disoriented and confused. We are unable to reach her listed emergency contact. She appears to be quite frail, and with what is evidently decompensated liver cirrhosis (with ascites, coagulopathy INR 2.4, hypoalbuminemia with alb 1.9 and anasarca). US abdomen yesterday did not demonstrate any clear are of CBD obstruction that could benefit from stenting but will defer this decision to GI team. Any sort of systemic treatment likely would further worsen her quality of life, symptoms and decrease survival than confer any benefit at this time. We would advise against pursuit of any cancer-directed treatment and focus on palliative and comfort-based care. We are happy to discuss our assessment and provide input,answer questions with family with a care conference.     Recommendations:  - will be available for care conference. Will not recommend further staging diagnostics or therapy unless goals of care clearly established.       Patient was seen with and the plan of care was discussed with attending physician Dr. Fran Mobley who agrees with the above history, physical exam and assessment/plan.     Arnol Cui   PGY-5 Fellow, Hematology,Oncology and BMT  Pager No: [phone]

## 2022-10-27 NOTE — PLAN OF CARE
Goal Outcome Evaluation:           Overall Patient Progress: no changeOverall Patient Progress: no change    Outcome Evaluation: attempted to visit with pt this AM but pt was sleeping and did not wake. NPO today for procedure. will continue to watch for GOC discussion    Iris Sanders RD, LD

## 2022-10-27 NOTE — PROGRESS NOTES
Patient arrives in IR for diagnostic paracentesis    She refused to position herself for US of the abdomen and refused any cares. We sent her back to her room upstairs and updated primary team.    Was going to be a small window for diagnostic paracentesis attempt.     Afebrile, soft and non tender abdomen - low suspicion for peritonitis

## 2022-10-27 NOTE — PROGRESS NOTES
Brief Medicine Cross Cover Note:    Pt awaiting transfer to [facility], but unlikely tonight given no beds available. Spoke to GI fellow to start work-up given pt may be here overnight and likely tomorrow. In addition to MELD labs, diagnostic para in IR, anticipate MRCP tomorrow (not ordered). Will make NPO overnight. Also added Palliative Care consult for GOC discussion and Oncology given gallbladder cancer status. Attempted to see pt, but sleeping and angry that I woke her up. Unable to reach family  By phone. Given pt was just on hospice and overall performance status for surgery is poor per previous providers notes, I believe that a GOC discussion with her healthcare proxy is a priority. Pt remains a full code currently.       Lluvia Kaplan, CNP, APRN  Internal Medicine [facility] Hospitalist  [facility]

## 2022-10-27 NOTE — CONSULTS
GASTROENTEROLOGY CONSULTATION      Date of Admission:  10/25/2022          ASSESSMENT AND RECOMMENDATIONS:     67 year old female with a history of cirrhosis, gallbladder cancer, T2DM, schizophrenia, CVA, HCV, polysubstance abuse, prior left femoral fracture s/p total hip replacement who presented to the ED on 10/26 with jaundice and lower extremity swelling. GI consulted for management of biliary dilatation.     #Gallbladder cancer  #Intrahepatic and extrahepatic biliary duct dilatation  #Goals of care discussions    Patient was diagnosed with gallbladder cancer on 04/2022 after laparoscopic cholecystectomy for acute cholecystitis. Noted to have T3 gallbladder cancer with negative margins, but no nodes were resected, tumor close to gallbladder fossa but no obvious invasion into hepatic parenchyma. Seen by Dr. Rocha (hepatobiliary and pancreatic surgery) on 07/25, she would need complete radical cholecystectomy and lymphadenectomy with re-evaluation of cystic duct margin, but because of multiple comorbidities, surgery was not offered. Chemotherapy was not offered by Oncology, patient was placed on hospice due to overall condition.     Patient now with worsening symptoms, family would like patient off hospice.       Labs revealed , total bili 21, and ultrasound of the abdomen with CBD dilation at 17 mm, both intrahepatic and extrahepatic biliary dilatation.    #Cirrhosis    Current MELD-Na score 28, plan to undergo diagnostic paracentesis by IR today. Likely cause of lower extremity swelling. No plans for liver transplantation from hepatology service.        RECOMMENDATIONS    - Please obtain CT scan abdomen/pelvis for further evaluation of bile duct obstruction.  - If there is biliary obstruction, would be able to provide ERCP for palliative decompression.  - Please clarify family situation and goals of care. Patient not cooperating with hospital staff and procedures, unclear if she would be amenable to  an ERCP, if that is offered.  Thank you for involving us in this patient's care. Please do not hesitate to contact the GI service with any questions or concerns.     Patient care plan discussed with Dr. Swift, GI staff physician.    Werner Rees MD  Gastroenterology Fellow  Pager             Chief Complaint:   We were asked by Dr. Perez of Medicine service to evaluate this patient with jaundice and biliary dilation.    History is obtained from the patient and the medical record.          History of Present Illness:   Maria Guadalupe Betancourt is a 67 year old female with a history of cirrhosis, gallbladder cancer, T2DM, schizophrenia, CVA, HCV, polysubstance abuse, prior left femoral fracture s/p total hip replacement who presented to the ED on 10/26 with jaundice and lower extremity swelling. GI consulted for management of biliary dilatation.     Patient was diagnosed with gallbladder cancer on 04/2022 after laparoscopic cholecystectomy for acute cholecystitis. Noted to have T3 gallbladder cancer with negative margins, but no nodes were resected, tumor close to gallbladder fossa but no obvious invasion into hepatic parenchyma. Seen by Dr. Rocha (hepatobiliary and pancreatic surgery) on 07/25, she would need complete radical cholecystectomy and lymphadenectomy with re-evaluation of cystic duct margin, but because of multiple comorbidities, surgery was not offered. Chemotherapy was not offered by Oncology, patient was placed on hospice due to overall condition.     Patient now with worsening symptoms, family would like patient off hospice.     Labs revealed , total bili 21, and ultrasound of the abdomen with CBD dilation at 17 mm, both intrahepatic and extrahepatic biliary dilatation.    Patient seen this morning, did not want to talk to me. I asked her 'could you tell me your name?' and her answer was 'no'. Could not obtain any history.             Past Medical History:   Reviewed and edited  as appropriate  Past Medical History:   Diagnosis Date     Cirrhosis (H)      CVA (cerebral vascular accident) (H)      Encounter for other orthopedic aftercare     Multiple bone fractures in the past     Hepatitis C      HSV (herpes simplex virus) anogenital infection     Ocular     Lung abscess (H)      Schizophrenia (H)      Seizure (H)      Syncope and collapse      Type 2 diabetes mellitus (H)             Past Surgical History:   Reviewed and edited as appropriate   Past Surgical History:   Procedure Laterality Date     ------------OTHER-------------      Left wrist surgery            Previous Endoscopy:   No results found for this or any previous visit.         Social History:   Reviewed and edited as appropriate  Social History     Socioeconomic History     Marital status: Single     Spouse name: Not on file     Number of children: Not on file     Years of education: Not on file     Highest education level: Not on file   Occupational History     Not on file   Tobacco Use     Smoking status: Some Days     Smokeless tobacco: Not on file   Substance and Sexual Activity     Alcohol use: Not Currently     Drug use: Not on file     Sexual activity: Not Currently   Other Topics Concern     Not on file   Social History Narrative     Not on file     Social Determinants of Health     Financial Resource Strain: Not on file   Food Insecurity: Not on file   Transportation Needs: Not on file   Physical Activity: Not on file   Stress: Not on file   Social Connections: Not on file   Intimate Partner Violence: Not on file   Housing Stability: Not on file            Family History:   Reviewed and edited as appropriate  No known history of gastrointestinal/liver disease or  gastrointestinal malignancies       Allergies:   Reviewed and edited as appropriate     Allergies   Allergen Reactions     Sulfur             Medications:     Current Facility-Administered Medications   Medication     glucose gel 15-30 g    Or     dextrose  "50 % injection 25-50 mL    Or     glucagon injection 1 mg     glucose gel 15-30 g    Or     dextrose 50 % injection 25-50 mL    Or     glucagon injection 1 mg     insulin aspart (NovoLOG) injection (RAPID ACTING)     insulin aspart (NovoLOG) injection (RAPID ACTING)     insulin aspart (NovoLOG) injection (RAPID ACTING)     insulin aspart (NovoLOG) injection (RAPID ACTING)     insulin aspart (NovoLOG) injection (RAPID ACTING)     insulin aspart (NovoLOG) injection (RAPID ACTING)     lidocaine (LMX4) cream     lidocaine 1 % 0.1-1 mL     melatonin tablet 1 mg     oxyCODONE IR (ROXICODONE) half-tab 2.5 mg     sodium chloride (PF) 0.9% PF flush 3 mL     sodium chloride (PF) 0.9% PF flush 3 mL             Review of Systems:     A complete review of systems was performed and is negative except as noted in the HPI           Physical Exam:   BP (!) 185/72   Pulse 92   Temp 99.1  F (37.3  C) (Oral)   Resp 15   Ht 1.626 m (5' 4\")   Wt 51.6 kg (113 lb 12.8 oz)   SpO2 99%   BMI 19.53 kg/m    Wt:   Wt Readings from Last 2 Encounters:   10/25/22 51.6 kg (113 lb 12.8 oz)   08/21/20 (!) 25.8 kg (56 lb 14.4 oz)      Unable to assess, patient did not allow physical exam.          Data:   Labs and imaging below were independently reviewed and interpreted    BMP  Recent Labs   Lab 10/26/22  2133 10/26/22  1726 10/26/22  1148 10/26/22  0830 10/25/22  2339   NA  --   --   --   --  139   POTASSIUM  --   --   --   --  3.2*   CHLORIDE  --   --   --   --  105   LAKESHA  --   --   --   --  8.4*   CO2  --   --   --   --  25   BUN  --   --   --   --  12   CR  --   --   --   --  0.63   * 185* 192* 200* 195*     CBC  Recent Labs   Lab 10/25/22  2339   WBC 6.5   RBC 2.94*   HGB 9.5*   HCT 28.0*   MCV 95   MCH 32.3   MCHC 33.9   RDW 19.9*        INR  Recent Labs   Lab 10/25/22  2339   INR 2.42*     LFTs  Recent Labs   Lab 10/25/22  2339   ALKPHOS 448*   ALT 25   BILITOTAL 21.0*   PROTTOTAL 7.0   ALBUMIN 1.9*      PANCNo lab results " found in last 7 days.    Imaging:    US abdomen 10/26/2022    IMPRESSION:  1.  Mild coarsened hepatic echotexture which can be seen with chronic underlying hepatic parenchymal disease. Recommend correlation with liver function studies.     2.  Patent portal vein.     3.  Intrahepatic and extrahepatic biliary dilatation. Recommend correlation for biliary obstruction consideration of MRCP to further evaluate the common bile duct and the pancreatic head.     4.  Minimal ascites surrounding the liver and right upper quadrant.

## 2022-10-27 NOTE — CONSULTS
[facility]  Palliative Care Consultation Note    Patient: Maria Guadalupe Betancourt  Date of Admission:  10/25/2022    Requesting Clinician / Team: Lluvia Kaplan APRN CNP  Reason for consult: Goals of care    Recommendations:    DNR/DNI, no ICU    Continue current cares and pursuing imaging for more information    Family is aware goals are not to attempt to treat cancer and to focus on things that are palliative/comfort related treatments    Goals are not aligned with hospice at this time, would recommend LTC versus home with home care once safe medically to discharge    These recommendations have been discussed with primary team.      Thank you for the opportunity to participate in the care of this patient and family. Our team: will continue to follow.     During regular M-F work hours -- if you are not sure who specifically to contact -- please contact us by sending a text page to our team consult pager at [phone].    After regular work hours and on weekends/holidays, you can call our answering service at [phone]. Also, who's on call for us is available in [employer].       Assessments:  Maria Guadalupe Betancourt is a 67 year old female who presented with LE swelling in the setting of cirrhosis and known gallbladder cancer, not on treatment. Patient was on hospice prior to presentation to the ED.     Today, the patient was seen for:  Gallbladder cancer  Cirrhosis  AMS  UTI    Prognosis, Goals, & Planning:      Functional Status just prior to hospitalization: 2 (Ambulatory and capable of all selfcare but unable to carry out any work activities; may need help with IADLs up and about > 50% of waking hours)    3 (Capable of only limited self-care; needs help with ADLs; in bed/chair >50% of waking hours)      Prognosis, Goals, and/or Advance Care Planning were addressed today: Yes        Summary/Comments: met with patient, granddaughter, and sister today. Discussed  their understanding of her illness and seems that they have some appreciation of her illnesses and that time is shorter, they do not have a good sense of where things are at now given she hasnt had recent imaging or evaluation of her cirrhosis and cancer. They do wish to pursue imaging to get a better understanding of where things are at and may be helpful for guiding decision making some. Discussed how aggressive to be and patient and family agreed to DNR/DNI and no ICU should she get sicker. Family very much wants the end goal to get her closer to their home in a LTC versus taking her home. Initially granddaughter wanted hospice as well but discussed that home care would make the most sense if they are still planning on taking her to the hospital and to medical appointments.       Patient's decision making preferences: unable to assess          Patient has decision-making capacity today for complex decisions: No            I have concerns about the patient/family's health literacy today: Yes           Patient has a completed Health Care Directive: Yes, and on file.      Code status: No CPR / No Intubation    Coping, Meaning, & Spirituality:   Mood, coping, and/or meaning in the context of serious illness were addressed today: Yes  Summary/Comments: daughter and sister present at supportive. Understandably frustrated with events of the past and good advocates for patient.     Social:     Living situation: came from granddaguthers home but prior was at a LTC    Key family / caregivers: granddaugther and sister present today    History of Present Illness:  History gathered today from: medical chart    Maria Guadalupe Betancourt is a 67 year old female who presented with LE swelling in the setting of cirrhosis and known gallbladder cancer, not on treatment. Patient was on hospice prior to presentation to the ED. Upon meeting with granddaughter and sister is sounds like they have been working with the care faciltiy to attempt  "to get patient transferred to a LTC closer to home and finally were so frustrated they took her home because she was getting more sleepy and not eating as much. The few days that she was at home with family she apparently seemed better and was eating more, was able to mobilize around the home.     Palliative care consulted 10/26      Key Palliative Symptom Data:  We are not helping to manage these symptoms currently in this patient.        ROS:  Comprehensive ROS is reviewed and is negative except as here & per HPI:     Past Medical History:  Past Medical History:   Diagnosis Date     Cirrhosis (H)      CVA (cerebral vascular accident) (H)      Encounter for other orthopedic aftercare     Multiple bone fractures in the past     Hepatitis C      HSV (herpes simplex virus) anogenital infection     Ocular     Lung abscess (H)      Schizophrenia (H)      Seizure (H)      Syncope and collapse      Type 2 diabetes mellitus (H)         Past Surgical History:  Past Surgical History:   Procedure Laterality Date     ------------OTHER-------------      Left wrist surgery         Family History:  No family history on file.      Allergies:  Allergies   Allergen Reactions     Sulfur         Medications:  I have reviewed this patient's medication profile and medications from this hospitalization.   Noted:  Trazodone 50 mg at bedtime  hydromorphone IV PRN- x2  Oxycodone PRN- 7.5 mg over last 24 hours    Physical Exam:  Vital signs:  Temp: 98.7  F (37.1  C) Temp src: Axillary BP: (!) 163/90 Pulse: 83   Resp: 16 SpO2: 98 % O2 Device: None (Room air)   Height: 162.6 cm (5' 4\") Weight: 51.6 kg (113 lb 12.8 oz)  Estimated body mass index is 19.53 kg/m  as calculated from the following:    Height as of this encounter: 1.626 m (5' 4\").    Weight as of this encounter: 51.6 kg (113 lb 12.8 oz).    Constitutional: Awake, alert, cooperative, chronically ill appearing, jaundiced, no apparent distress  ENT: Normocephalic, without obvious " abnormality, atramatic  Lungs: No increased work of breathing, good air exchange  Musculoskeletal: No redness, warmth, or swelling of the joints.   Neurologic: Awake, alert, oriented to self  Neuropsychiatric: Normal affect, mood  Skin: No rashes, erythema, jaundiced      Data reviewed:  Recent imaging reviewed, my comments on pertinents:   Chest xray 10/26  IMPRESSION: Relatively low lung volumes which exaggerates the pulmonary vasculature. The heart size is normal. There are linear opacities of the left midlung favored to represent subsegmental atelectasis. Patchy opacity at the left lower lung which could    represent additional atelectasis however infectious infiltrate is also a consideration and attention on follow-up is recommended. There are atherosclerotic calcifications of the aortic arch. No acute osseous abnormality.    Abdomen CT 10/2  IMPRESSION:   1.  Mild coarsened hepatic echotexture which can be seen with chronic underlying hepatic parenchymal disease. Recommend correlation with liver function studies.     2.  Patent portal vein.     3.  Intrahepatic and extrahepatic biliary dilatation. Recommend correlation for biliary obstruction consideration of MRCP to further evaluate the common bile duct and the pancreatic head.     4.  Minimal ascites surrounding the liver and right upper quadrant.     Recent lab data reviewed, my comments on pertinents:   Creatinine 0.59  GFR > 90    On admission 10/25: sodium 139, potassium 3.2, bilirubin 21.0, WBC 6.5, hemoglobin 9.5, platelets 320      SYLVIA Garcia CNS  Palliative Care Consult Team  Pager: [phone]    85 minutes spent. This includes 55 ([phone]) minutes face to face with patient and family discussing current complex health conditions and prognosis, goals of care, symptom management. Coordination of care with the primary team, oncology, palliative  and SW, and RNCC regarding goals of care and symptom management.

## 2022-10-27 NOTE — PROGRESS NOTES
"CLINICAL NUTRITION SERVICES  -  ASSESSMENT NOTE      Future/Additional Recommendations:   - watch for diet adv post-op, consider adding snacks/supps if pt willing  - monitor for GOC discussion   Malnutrition:   at least Moderate malnutrition in the context of - Chronic illness or disease       REASON FOR ASSESSMENT  Maria Guadalupe Betancourt is a 67 year old female seen by Registered Dietitian for Admission Nutrition Risk Screen for positive (unsure wt loss, no decreased appetite)      NUTRITION HISTORY  - Information obtained from chart review  - Unable to obtain nutrition history from pt  - PMH of cirrhosis and known gallbladder cancer (not currently on treatment)  - per chart review, pt seen by RD 8/2020  - attempted to visit with pt this AM. Pt was sleeping and did not wake to voice. Per nurse in room, pt has been refusing cares. No family in room this AM  - per H&P, pt is not answering questions    CURRENT NUTRITION ORDERS  Diet Order: NPO - for procedure    Current Intake/Tolerance:  - per health touch, no meals ordered yet this admit  - per nursing flow sheet, no documented intakes this admit      NUTRITION FOCUSED PHYSICAL ASSESSMENT FOR DIAGNOSING MALNUTRITION)  Yes         Observed:    Muscle wasting (refer to documentation in Malnutrition section), Subcutaneous fat loss (refer to documentation in Malnutrition section) and Fluid retention (refer to documentation in Malnutrition section)  - pt appeared with fluid retention during visit this AM       Obtained from Chart/Interdisciplinary Team:  - admitted for LE swelling    ANTHROPOMETRICS  Height: 5' 4\"  Weight: 113 lbs 12.8 oz  Body mass index is 19.53 kg/m .   Weight Status:  Normal BMI - low end near underwt status  IBW: 54.5 kg  % IBW: 95%  Weight History: difficult to assess wt hx without adequate wts, suspect wt is trending up - may be fluid-related  10/25/22 : 51.6 kg (113 lb 12.8 oz)   07/25/22 : 49.6 kg (109 lb 6.4 oz) - per care everywhere  08/17/20 : " 45.4 kg (100 lb)    LABS  Labs reviewed (10/25): K+ 3.2 (L), alk phos 448 (H), -200  - 10/26: MELD score of 26    MEDICATIONS  Medications reviewed: insulin aspart (LSSI)    PER CHART REVIEW:  General:  - was on hospice prior to admit, pt/family would like restorative cares this admit    GI/Nutrition:  - last BM x1 today  - 10/27: pt to have diagnostic paracentesis.       ASSESSED NUTRITION NEEDS PER APPROVED PRACTICE GUIDELINES:  Dosing Weight: 51.6 kg (actual, admit wt)  Estimated Energy Needs: 4880-7663 kcals (30-35 Kcal/Kg)  Justification: per [employer] practice guidelines  Estimated Protein Needs:  grams protein (1.5-2 g pro/Kg)  Justification: per [employer] practice guidelines  Estimated Fluid Needs: 1 mL/Kcal  Justification: maintenance OR per provider pending fluid status    MALNUTRITION:  % Weight Loss:   Unable to assess - suspect true wt trends are hidden by fluid-shifts  % Intake:  Unable to assess - unable to obtain nutrition hx from pt  Subcutaneous Fat Loss:  Orbital region moderate depletion  Muscle Loss:  Temporal region moderate depletion and Clavicle bone region moderate depletion  Fluid Retention:  Unable to assess - admitted with LE swelling    Malnutrition Diagnosis: at least Moderate malnutrition in the context of -  Chronic illness or disease    NUTRITION DIAGNOSIS:  Inadequate oral intake related to current diet order and suspected poor intake prior to admit secondary to increased needs with ESLD as evidenced by NPO since admit, at least moderate fat and muscle loss      NUTRITION INTERVENTIONS  Recommendations / Nutrition Prescription  - Nutrition education: Not appropriate at this time due to patient condition      Implementation  Nutrition education   Collaboration and Referral of Nutrition care - spoke with nursing in pt's room       Nutrition Goals  Diet adv past NPO post-op within 2-3 days.  If diet adv, Patient to consume >75% of nutritionally adequate meals TID over the next 5-7  days.      MONITORING AND EVALUATION:  Progress towards goals will be monitored and evaluated per protocol and Practice Guidelines      Iris Sanders RD, LD

## 2022-10-27 NOTE — UTILIZATION REVIEW
"Admission Status; Secondary Review Determination     Admission Date: 10/25/2022 10:53 PM       Under the authority of the Utilization Management Committee, the utilization review process indicated a secondary review on the above patient.  The review outcome is based on review of the medical records, discussions with staff, and applying clinical experience noted on the date of the review.          (x) Observation Status Appropriate - This patient does not meet hospital inpatient criteria and is placed in observation status. If this patient's primary payer is Medicare and was admitted as an inpatient, Condition Code 44 should be used and patient status changed to \"observation\".       RATIONALE FOR DETERMINATION      Brief clinical presentation, information copied from the chart, abbreviated and edited for relevant content:     Paged team to change to OBS.      Patient is a 67 year old female with a history of cirrhosis, gallbladder cancer, T2DM, schizophrenia, CVA, HCV, polysubstance abuse, prior left femoral fracture s/p total hip replacement who presented to the ED on 10/26 with jaundice and lower extremity swelling. GI consulted for management of biliary dilatation.   Patient was diagnosed with gallbladder cancer on 04/2022 after laparoscopic cholecystectomy for acute cholecystitis. Noted to have T3 gallbladder cancer with negative margins, but no nodes were resected, tumor close to gallbladder fossa but no obvious invasion into hepatic parenchyma. Seen by Dr. Rocha (hepatobiliary and pancreatic surgery) on 07/25, she would need complete radical cholecystectomy and lymphadenectomy with re-evaluation of cystic duct margin, but because of multiple comorbidities, surgery was not offered. Chemotherapy was not offered by Oncology, patient was placed on hospice due to overall condition.Labs revealed , total bili 21, and ultrasound of the abdomen with CBD dilation at 17 mm, both intrahepatic and extrahepatic " biliary dilatation.  plan to undergo diagnostic paracentesis by IR today but patient refused at the visit.         The severity of illness, intensity of cares provided, risk for adverse outcome, and expected LOS make the care appropriate for observation.       The information on this document is developed by the utilization review team in order for the business office to ensure compliance.  This only denotes the appropriateness of proper admission status and does not reflect the quality of care rendered.         The definitions of Inpatient Status and Observation Status used in making the determination above are those provided in the CMS Coverage Manual, Chapter 1 and Chapter 6, section 70.4.      Sincerely,     Thao Dickey MD   Utilization Review/ Case Management  [facility].

## 2022-10-27 NOTE — PLAN OF CARE
"Status: Cirrhosis   Vitals: BP (!) 175/75   Pulse 85   Temp 98.1  F (36.7  C) (Oral)   Resp 18   Ht 1.626 m (5' 4\")   Wt 51.6 kg (113 lb 12.8 oz)   SpO2 97%   BMI 19.53 kg/m     Vs. , Prn hydralazine give current /75.   Neuros: unable to assess orientation, pt response no to orientation questions. agitated and verbally aggressive.   IV: PIV left. Infusing   Labs/Electrolytes: Pt refused lab draw and BS checks , MD notified.   Resp/trach: RA, no dyspnea or sob   Diet: Regular diet, pt refused to eat.   GI: Small bm formed. Denies nausea   : voiding adequately   Skin: Refused skin assessment. Visible skin Jaundice.    Pain: managed with oxycodone PRN x1   Activity: SBA with walker.   Plan: continue plan of care.     Goal Outcome Evaluation:      Plan of Care Reviewed With: patient    Overall Patient Progress: no changeOverall Patient Progress: no change           "

## 2022-10-27 NOTE — PLAN OF CARE
Goal Outcome Evaluation:      Plan of Care Reviewed With: patient    Overall Patient Progress: no changeOverall Patient Progress: no change    Outcome Evaluation: VSS ex HTN on room air. MIHAI orientation. Pt only verbalizes when saying yes or no. Pt uncooperative with cares and forgetful. Refusing VS and BS overnight. A1 with walker; bed alarm on. Frequently setting off bed alarm; order for 1:1 sitter this AM. Nonverbal indicators of pain; prn oxy x1. NPO since 0000 for diagnostic para in IR and MRCP. BM this AM with blood; team paged.

## 2022-10-28 NOTE — PROVIDER NOTIFICATION
Judit Domingo MD   FYI Has been hypertensive up to 160s systolic. No parameters are in for vital signs but just wanted to let you know even though I see the PRN for over 180.

## 2022-10-28 NOTE — PHARMACY-ADMISSION MEDICATION HISTORY
Admission Medication History Completed by Pharmacy    See [facility] Admission Navigator for allergy information, preferred outpatient pharmacy, prior to admission medications and immunization status.     Medication History Sources:     Fill history    [facility] to verify some directions for insulin    Changes made to PTA medication list (reason):    Added: all    Deleted:   o Metformin - , no recent fill history    Changed:   o Lantus from 20 units at bedtime to 35 units per pharmacy  o Olanzapine from 5 mg ODT to 10 mg tablet per fill records    Additional Information:    Information provided is solely based on fill records, do not have MAR from previous facility, but fill history is consistent and likely accurate.    Prior to Admission medications    Medication Sig Last Dose   carvedilol (COREG) 25 MG tablet Take 25 mg by mouth 2 times daily (with meals) Past Week   cholecalciferol (VITAMIN D3) 125 mcg (5000 units) capsule Take by mouth daily Past Week   famotidine (PEPCID) 20 MG tablet Take 20 mg by mouth 2 times daily Past Week   furosemide (LASIX) 20 MG tablet Take 20 mg by mouth daily Past Week   gabapentin (NEURONTIN) 100 MG capsule Take 200 mg by mouth 4 times daily Past Week   gabapentin (NEURONTIN) 100 MG capsule Take 100 mg by mouth every 4 hours as needed for other (anxiety, pain, restlessness) Past Week   HYDROcodone-acetaminophen (NORCO) 5-325 MG tablet Take 1-2 tablets by mouth every 4 hours as needed for severe pain Past Week   hyoscyamine (LEVSIN/SL) 0.125 MG sublingual tablet Place 0.125 mg under the tongue every 4 hours as needed (excessive oral secretions) Past Week   insulin glargine (LANTUS PEN) 100 UNIT/ML pen Inject 35 Units Subcutaneous At Bedtime    insulin lispro (HUMALOG KWIKPEN) 100 UNIT/ML (1 unit dial) KWIKPEN Inject Subcutaneous 3 times daily (before meals) 0-150: none  151-250: 4 units  251-350: 6 units  351-450: 8 units  451+: 10 units and call doctor if not coming  down within 2 hours Past Week   loperamide (IMODIUM) 2 MG capsule Take 4 mg by mouth every 4 hours as needed for diarrhea Unknown   LORazepam (ATIVAN) 0.5 MG tablet Take 0.5 mg by mouth every 4 hours as needed for anxiety or agitation Unknown   mirtazapine (REMERON) 15 MG tablet Take 15 mg by mouth At Bedtime Past Week   OLANZapine (ZYPREXA) 10 MG tablet Take 5 mg by mouth 2 times daily Past Week   ondansetron (ZOFRAN) 4 MG tablet Take 4 mg by mouth every 8 hours as needed for nausea Unknown   QUEtiapine (SEROQUEL) 100 MG tablet Take 100 mg by mouth At Bedtime Past Week   rosuvastatin (CRESTOR) 5 MG tablet Take 5 mg by mouth At Bedtime Past Week   senna-docusate (SENOKOT-S/PERICOLACE) 8.6-50 MG tablet Take 1 tablet by mouth daily Hold for loose stools Past Week   traZODone (DESYREL) 50 MG tablet Take 50 mg by mouth At Bedtime Past Week       Date completed: 10/28/22    Medication history completed by: Usha Polanco, PharmD

## 2022-10-28 NOTE — PLAN OF CARE
Mental status at baseline: not met  BP stable on oral medications: not met  Abx plan in place: met  Paracentesis completed: not met

## 2022-10-28 NOTE — PROGRESS NOTES
Care Management Follow Up    Length of Stay (days): 1    Expected Discharge Date: 10/31/2022     Care Conference today, Jamal granddaughter, will be here at 130pm  Paged Oncology Arnol Cui, Pager No: [phone]  Confirmed Gold 4 Camilo HIGGINS will attend, he will speak with Palliative      Concerns to be Addressed: Writer spoke with patients [facility] Nurse Andie for >30 minutes. Information contained in this note is from the conversation with Andie. She emailing writer BOY (send to [employer], via email, for verification and addition to EMR at 1420 today). BOY has two family members named: Jamal and Roselyn.    *Patient is not decisional (Andie reports slums of 7 or 8)    When patient first admitted to the [facility] she had a mental health  due to [facility] commitment (Nov 2021). [facility] tried to get granddaughter off due to difficulties with family dynamics, family behaviors, etc. They were unsuccessful. Patient is no longer on county commitment and mental health  no longer follows.    Patient was not in need of any medical care when she left the facility on 10/22 (jaundice is baseline). Andie repeatedly emhphasized that despite their efforts to educate them patients family does not understand she is in stages of dying due to liver disease. They want patient to get a transplant.    Family picked patient up from [facility] on Saturday 10/22 and never brought her back. They only had enough medications for 48 hours. Family wouldn't answer the phone when MASSIEL called. Family never returned phone calls to messages [facility] or Hospice agency left. There is an eighteen day bed hold at the [facility] (started when she was picked up on 10/22, ends 11/8)    *Patient had been sober for many years after a long history of drug abuse.  *Patient loves [employer] bar.   *Before signing onto Hospice patient received both long and short acting insulin.    Community Services still involved in patients care:  Jesus  Care Coordinator (through [facility]) Екатерина Berg  [facility]  Cathryn Perez (ph: [phone])      Patient plan of care discussed with Gold 4 Team: Yes    Anticipated Discharge Disposition:  Return to [facility]  [address] [place] [address]  Phone: [phone]    On day of discharge patient needs to arrive before 2pm, orders need to be faxed by 10am max on day of discharge. [facility] accepts patient only Monday through Friday (no nurse on weekends).    Anticipated Discharge Services:    [facility] has discharged patient due to lack of contact and participation by/from family. Initially signed onto hospice 9/2022. Patient was receiving sublingual dilaudid and ativan, for agitation and pain, prescribed by Hospice. [facility] does not have a prescriber for these medications unless patient is sign on with hospice.    Anticipated Discharge DME:  Maria Guadalupe had a hospital bed through hospice and they are picking it up today 10/28. She ll need a hospital bed at discharge.      Patient/family educated on Medicare website which has current facility and service quality ratings:  N/A    Education Provided on the Discharge Plan:  N/A    Patient/Family in Agreement with the Plan:  N/A    Referrals Placed by CM:  N/A    Private pay costs discussed: Not applicable, [facility]      Leelee Ritter RN, BSN, PHN  Nurse Care Coordinator  Unit 5A, Rooms 5211-1 to 5219  Unit 5C, Rooms 4494-5079  [facility]   Desk phone & confidential voicemail: [phone]  Mon-Fri Pager: [phone]    To contact the On-call Weekend RNCC  [place] (0800 - 1630) Saturday and Sunday    Units: 4A, 4C, 4E, 5A and 5B - Pager 1: [phone]    Units: 6A, 6B, 6C, and 6D - Pager 2: [phone]    Units: 7A, 7B, 7C, 7D, and 5C- Pager 3: [phone]    [facility] ([phone]) Saturday and [place]    Units: 5 Ortho, 8A, 10 ICU, & Pediatric  Units-Pager 4: [phone]          Granddaughter Jamal Pooja ROSA,   alterative agent is Cousin Roselyn

## 2022-10-28 NOTE — PLAN OF CARE
Goal Outcome Evaluation: 1900-0730      Plan of Care Reviewed With: patient    Overall Patient Progress: no change     Outcome Evaluation:     Respiratory: WDL RA, Denies SOB  Cardiac: hypertensive asymptomatic, PRN hydralazine available Denies Chest pain.  Neuro: Disoriented x 4 unable to assess well due to pt not responding to orientation questions. Aggressive at times, sitter at bedside  GI/: Voiding spontaneously and multiple loose BMs with commode   Diet: Regular  Skin: refused interventions slight redness on sacrum   Lines/drains: R PIV SL  Pain: PRN dilaudid and oxy given for pain  Labs: reviewed   Activity: SBA  Plan: refused para yesterday potential CT today

## 2022-10-28 NOTE — PROGRESS NOTES
[facility]    Medicine Progress Note - Hospitalist Service, GOLD TEAM 4    Date of Admission:  10/25/2022    Assessment & Plan    Maria Guadalupe Betancourt is a 67 year old female with PMHx of gallbladder cancer, hepatitis C cirrhosis, cognitive impairment, anxiety and schizophrenia. Patient reportedly on hospice since 2 weeks PTA, however, was brought to the ED by her granddaughter reporting the patient no longer wanted to be on hospice cares. On day following admission, unable to reach any family members until late in the afternoon despite multiple attempts. Currently, managing symptomatically while treating UTI, confusion and edema.      # Goals of care   Please see initial discussion in my note 10/27. Pt's POA is her granddaughter Jamal.    - Care was discussed today (10/28) with family, myself, oncology, and palliative care   - [facility] discussion today with   - family is interested in some restorative cares (abx, edema control, anxiety and pain management). They are aware of terminal state of her cholangiocarcinoma. Are not interested in chemo, radiation or invasive procedures.  - family is interested in further evaluation of disease state with CT scan   - Family is then interested in discharge to a care facility closer to their home in [place].   - Pharmacy able to complete med rec with records obtained from [facility].   - Patient IV line obstructed and pulled out. Will attempt to transition most meds to orals.    # Acute confusion/Delerium   # Hepatic encephalopathy, suspected    # Gallbladder cancer  # Intrahepatic and extrahepatic biliary duct dilatation  # Ascities   See goals of care discussion above. Overall, pt has been confused throughout the day and declining cares from multiple specialty teams.    - Palliative care consulted and assistance with care conference, symptom control is appreciated   - CT ordered for further evaluation of disease state   - IR consulted for  paracentesis - patient refused this 10/27   - will consider paracentesis based on CT results   - Oncology input appreciated    - No further diagnostics or therapeutics recommended   - GI consulted    - Ordered CT abdomen/Pelvis w/ contrast    - Consider ERCP pending CT-abd/pelvis and family wishes  - Continue 1:1 sitter due to confusion   - Convert IV Lasix to PTA PO 20 mg daily     # Hypertensive urgency   Likely acute pain contributing. BP up to 200/95 10/27 but somewhat improved today  - Restart Coreg 25 mg BID per med rec   - IV Hydralazine 10 mg q 4 PRN for SBP > 180 or DBP >110  - Starting Anxiolytics and pain control hopefully will help curb BP   - Consider starting anti-hypertensive agent tomorrow pending goals of care     # UTI   # Infectious work up  UA positive in the ED. Treating UTI may help confusion   - UCx growing E. Coli, resistent to ampiciling  - due to lost IV will transition abx to PO Keflex for another 4 days     # Cirrhosis 2/2 Hepatitis C and gallbladder cancer  MELD-Na score: 28 at 10/27/2022  8:08 PM  MELD score: 28 at 10/27/2022  8:08 PM  Calculated from:  Serum Creatinine: 0.59 mg/dL (Using min of 1 mg/dL) at 10/27/2022  8:08 PM  Serum Sodium: 139 mmol/L (Using max of 137 mmol/L) at 10/25/2022 11:39 PM  Total Bilirubin: 21.0 mg/dL at 10/25/2022 11:39 PM  INR(ratio): 2.42 at 10/25/2022 11:39 PM  Age: 67 years  - Consider diagnostic para if concern for SBP  - Daily MELD labs    # Anxiety   Pt reports anxiety as being a main determent to the patient's comfort.   - Appreciate pharmacy for completing med rec. Pt reportedly taking PO ativan 0.5 mg q 4 PRN, Olanzapine 10 mg BID, Mirtazapine 15 mg at night, Trazodone 50 mg at night, Seroquel 100 mg at night,   - These meds had all been discontinued in the setting of hospice so she has not been taking them for > 1 month  - Ordered Trazadone 50 mg at bedtime   - Ordered PO ativan 0.5 mg q 4 PRN for anxiety   - Ordered Norco 1 tablet q 4 PRN     #  Pain control   Med recs being on gabapentin 200 mg QID and 100 mg q 4 hours PRN and norco 5-325 1-2 tablets     #T2DM  Per POA was previously on insulin but this was stopped with transition to hospice. Per med rec was taking Lantuss 35 units plus medium ssi prior to being on hospice  - Will not restart basal inuslin  - BG ranging 150-200   - Medium intensity Sliding scale insulin   - Would favor against starting a new long-term diabetic medication     # Schizophrenia  - pharmacy consulted for prior med rec         Diet: Regular Diet Adult    DVT Prophylaxis: Pneumatic Compression Devices  Hunter Catheter: Not present  Central Lines: None  Cardiac Monitoring: None  Code Status: No CPR- Do NOT Intubate      Disposition Plan     Expected Discharge Date: 10/31/2022                The patient's care was discussed with the Attending Physician, Dr. Perez, Bedside Nurse, Care Coordinator/, Patient, Patient's Family and IR, GI, Palliative, oncology, and social work Consultant    Camilo Steinberg PA-C  Hospitalist Service, [address]  Securely message with the Vocera Web Console (learn more here)  Text page via [facility] Paging/Directory   Please see signed in provider for up to date coverage information      Clinically Significant Risk Factors              # Hypoalbuminemia: Lowest albumin = 1.9 g/dL (Ref range: 3.5-5.2) at 10/25/2022 11:39 PM, will monitor as appropriate           # Moderate Malnutrition: based on nutrition assessment, PRESENT ON ADMISSION       ______________________________________________________________________    Interval History   Care conference today as above.      Data reviewed today: I reviewed all medications, new labs and imaging results over the last 24 hours. Please see discussion of these results in the A/P.    Physical Exam     Vital Signs: Temp: 99  F (37.2  C) Temp src: Axillary BP: (!) 166/75 Pulse: 81     Resp: 16 SpO2:  100 % O2 Device: None (Room air)    Weight: 113 lbs 12.8 oz    Constitutional: Delirious. Confused.   Eyes: EOM, PERRLA. + icterus   HENT: Normocephalic, without obvious abnormality, atraumatic.   Respiratory: No increased work of breathing.    Cardiovascular: RRR. No murmur or friction rub. No edema. No chest wall tenderness.  GI: Soft, somewhat distended and with mild tenderness Bowel sounds are active.   Skin: Diffuse jaundice   Musculoskeletal:  Full range of motion noted.    Neurologic: Cranial nerves II-XII are grossly intact.     Data   Recent Labs   Lab 10/28/22  1312 10/28/22  0956 10/28/22  0844 10/28/22  0231 10/27/22  2147 10/27/22  2008 10/26/22  0830 10/25/22  2339   WBC  --  6.1  --   --   --   --   --  6.5   HGB  --  10.3*  --   --   --   --   --  9.5*   MCV  --  99  --   --   --   --   --  95   PLT  --  360  --   --   --   --   --  320   INR  --  4.48*  --   --   --   --   --  2.42*   NA  --   --   --   --   --   --   --  139   POTASSIUM  --   --   --   --   --   --   --  3.2*   CHLORIDE  --   --   --   --   --   --   --  105   CO2  --   --   --   --   --   --   --  25   BUN  --   --   --   --   --   --   --  12   CR  --   --   --   --   --  0.59  --  0.63   ANIONGAP  --   --   --   --   --   --   --  9   LAKESHA  --   --   --   --   --   --   --  8.4*   *  --  150* 147*   < >  --    < > 195*   ALBUMIN  --   --   --   --   --   --   --  1.9*   PROTTOTAL  --   --   --   --   --   --   --  7.0   BILITOTAL  --   --   --   --   --   --   --  21.0*   ALKPHOS  --   --   --   --   --   --   --  448*   ALT  --   --   --   --   --   --   --  25    < > = values in this interval not displayed.       No results found for this or any previous visit (from the past 24 hour(s)).

## 2022-10-28 NOTE — PLAN OF CARE
Goal Outcome Evaluation:      Plan of Care Reviewed With: patient, grandchild(augusto), family          Outcome Evaluation: Pt intermittently AOx4, fortful/confised at times, VSS on RA, c/o pain, dilaudid IV and oral oxycodone administered, PIV to right forearm occluded and pulled out, vascular team unable to place new PIV, reccomnds PICC, primary team had a family meeting and new PIV with ultrasound to be placed in, pt to have CT done later evening, pt code status changed to DNR.

## 2022-10-28 NOTE — PROGRESS NOTES
Medical Oncology   Progress Note   Date of Service: 10/28/2022  Patient: Maria Guadalupe Betancourt  MRN: [id number]  Admission Date: 10/25/2022  Hospital Day # 1        Assessment & Plan:   Maria Guadalupe Betancourt is a 67 year old female with history of Hep C cirrhosis of the liver (decompensated) and multiple medical comorbidities with a diagnosis of locally-advanced gall bladder cancer in Apr 2022, without further cancer-directed treatment (either surgical or chemotherapy or RT) who transitioned to SNF-based hospice care 2 months ago, who is now admitted with symptoms of worsening anasarca.    Oncology team participated in a  productive and meaningful care conference with primary medicine and palliative care teams at bedside today with patient's family members today- Jamal (oldest granddaughter) and cousin Roselyn both of whom are listed healthcare agents for her and empowered to make medical deicisions - they provided paperwork confirming this as well.     It appears that patient and family had decided in August following discussion with both surgical oncology(Dr. Rocha, [facility]) and medical oncology ([facility])-to not pursue further surgical or systemic treatment interventions for her cancer and to focus instead on comfort and palliation of her symptoms.  She was enrolled in hospice 2 months ago, and received hospice services whilst staying at her SNF in [place] (that she has been for the last year).  However, more recently, patient's family feel that the distance is proving a barrier in their ability to see her often which they feel is leading to more despondence on the part of the patient.  Towards this, a initially brought her home to [place] but were unable to manage her worsening symptoms and so brought into the ED for improvement of some of the symptoms.  They are hoping to eventually get her to a SNF closer to home i.e. in [facility].  Primary medicine team and palliative care team  "provided excellent input and potential planning towards this.  From oncology standpoint, we reiterated that her hyperbilirubinemia is most likely from cirrhosis but could also be from progressive cancer and possible biliary obstruction.  In the latter case-there could be a role for.  Stenting or other decompressive procedures with a palliative intent.  A CT abdomen and pelvis with contrast would be helpful towards this, if she is agreeable.  Patient was intermittently interactive and appeared to be listening during the session.  It is unclear how much she was comprehending. Jamal and Roselyn are still clear that they would not want to pursue any cancer-directed therapy at this time in concordance with what they feel would have been her wishes. We provided support and agreed with their understanding that any such interventions would likely do more harm than provide any benefit.     Recommendations:   -Reasonable to obtain CT Abdomen/pelvis with contrast to assess if any potentially correctable biliary obstruction and to assess current extent of disease.      Patient was seen and plan of care was discussed with attending physician Dr. Fran Cui   PGY-5 Fellow, Hematology,Oncology and BMT  Pager No: [phone]    ___________________________________________________________________    Subjective & Interval History:    No acute events overnight.  Patient was reportedly restless but not agitated.  Allowing some cares, but refusing others.  Declined paracentesis yesterday.  Declined IV placement for CT contrast today-so no CT done.    Was much more awake, interactive and comfortable and family was in the room and later in the afternoon.  See above for discussion of care conference.      Physical Exam:    Blood pressure (!) 166/75, pulse 81, temperature 99  F (37.2  C), resp. rate 16, height 1.626 m (5' 4\"), weight 51.6 kg (113 lb 12.8 oz), SpO2 100 %.    General: lying in bed, looks more awake " and alert today in presence of family, interacting with them, occasionally drowsy   HEENT: icteric   Neck: no JVD, sunken supraclavicular fossae  Resp: no visible resp distress, RR around 18  GI: remains slightly distended,   MSK: edema in extremities significantly improved today   Skin: diffusely icteric     Labs & Studies: I personally reviewed the following studies:      ROUTINE LABS (Last four results):    Labs reviewed, no BMP,  Stable hemoglobin and WBC count.  elevated INR 4.48    IMAGING (new, reviewed with patient)   none    PATHOLOGY - (new, reviewed with patient)   none

## 2022-10-29 NOTE — PLAN OF CARE
Goal Outcome Evaluation:      Plan of Care Reviewed With: patient          Outcome Evaluation: Pt intermittently confusaed otherwise A&O, VSS on RA, c/o pain, oral narco offered but declined, pt code status changed to DNR, pt was aggreeable to PIC placement but while down at CT refused, CT was done without contrast, pt with non verbal cues of pain but declines pain meds plus other scheduled meds. SBA with 1:1

## 2022-10-29 NOTE — PROGRESS NOTES
[facility]    Medicine Progress Note - Hospitalist Service, GOLD TEAM 4    Date of Admission:  10/25/2022    Assessment & Plan    Maria Guadalupe Betancourt is a 67 year old female with PMHx of gallbladder cancer, hepatitis C cirrhosis, cognitive impairment, anxiety and schizophrenia. Patient reportedly on hospice since 2 weeks PTA, however, was brought to the ED by her granddaughter reporting the patient no longer wanted to be on hospice cares. On day following admission, unable to reach any family members until late in the afternoon despite multiple attempts. Currently, managing symptomatically while treating UTI, confusion and edema.      # Goals of care   Please see initial discussion in my note 10/27 & Goals of care discussion from 10/28. Pt's POA is her granddaughter Jamal. Secondary contact is her 1st cousin Marine. Appreciate pharmacy for completing med rec.  - Care was discussed 10/28 with family, myself, oncology, and palliative care. Patient Made DNR/DNI   - Family is interested in some restorative cares (abx, edema control, anxiety and pain management). They are aware of terminal state of her cholangiocarcinoma. Are not interested in chemo, radiation or invasive procedures.  - Family is then interested in discharge to a care facility closer to their home in [place]. Per conversation with SW this is likely to be very difficult 2/2 to her needs for memory care and need for 24 hour care   - Patient has repeatedly refused IV and PICC placement      - Will defer on re-ordering as pt has been resistent to overall cares. Likely PICC to provide more harm than benefit given current cognitive state        # Hepatic encephalopathy, suspected    # Gallbladder cancer  # Intrahepatic and extrahepatic biliary duct dilatation  # Ascities   See goals of care discussion above. Overall, pt has been confused since admission and declining cares from multiple specialty teams.    -  Ordered OT consult for cognitive eval   - CT ordered for further evaluation of disease state 10/28. Unfortunately, d/t pt refusing peripheral line this was done w/o contrast   - Prominent intrahepatic biliary dilation suspicious for obstructing tumors, new compared to 7/25   - recommend f/u with contrast CT or MRCP   - Concern for cranial rotation of L hip arthroplasty  - Concern for possible sigmoid diverticulitis    - IR consulted for paracentesis - patient refused this 10/27   - will consider paracentesis based on CT results   - Oncology input appreciated    - No further diagnostics or therapeutics recommended    - Attended   - GI consulted    - CT abdomen/pelvis w/out contrast as above   - Consider further intervention pending input from family  - Convert IV Lasix to PTA PO 20 mg daily   - 1:1 sitter discontinued today as medications have been converted to orals       # Dementia  Pt previously residing at memory care unit. reportedly had a slums < 10/30.  - Ordered OT consult for cognitive eval       # Hypertensive urgency   Likely acute pain contributing. BP up to 200/95 10/27 but somewhat improved today  - Restart Coreg 25 mg BID per med rec   - IV Hydralazine 10 mg q 4 PRN for SBP > 180 or DBP >110  - Starting Anxiolytics and pain control hopefully will help curb BP   - Consider starting anti-hypertensive agent tomorrow pending goals of care     # UTI   # Infectious work up  UA positive in the ED. Treating UTI may help confusion   - UCx growing E. Coli, resistent to ampiciling  - due to lost IV will transition abx to PO Keflex for another 4 days (tx started 10/27)    #T2DM  Per POA was previously on insulin but this was stopped with transition to hospice. Per med rec was taking Lantuss 35 units plus medium ssi prior to being on hospice.   - BG ranging 150-200   - Will restart lantuss at 6 units today  - Medium intensity Sliding scale insulin   - Hypoglycemia protocol     # Cirrhosis 2/2 Hepatitis C and  Cholangiocarcinoma   MELD-Na score: 28 at 10/27/2022  8:08 PM  MELD score: 28 at 10/27/2022  8:08 PM  Calculated from:  Serum Creatinine: 0.59 mg/dL (Using min of 1 mg/dL) at 10/27/2022  8:08 PM  Serum Sodium: 139 mmol/L (Using max of 137 mmol/L) at 10/25/2022 11:39 PM  Total Bilirubin: 21.0 mg/dL at 10/25/2022 11:39 PM  INR(ratio): 2.42 at 10/25/2022 11:39 PM  Age: 67 years  - Consider diagnostic para if concern for SBP  - Daily MELD labs    # Anxiety   Pt reports anxiety as being a main determent to the patient's comfort.   - Appreciate pharmacy for completing med rec. Pt reportedly taking PO ativan 0.5 mg q 4 PRN, Olanzapine 10 mg BID, Mirtazapine 15 mg at night, Trazodone 50 mg at night, Seroquel 100 mg at night,   - These meds had all been discontinued in the setting of hospice so she has not been taking them for > 1 month  - Ordered Trazadone 50 mg at bedtime   - Ordered PO ativan 0.5 mg q 4 PRN for anxiety   - Ordered Norco 1 tablet q 4 PRN     # Pain control   Med recs being on gabapentin 200 mg QID and 100 mg q 4 hours PRN and norco 5-325 1-2 tablets     # Schizophrenia  - pharmacy consulted for prior med rec         Diet: Regular Diet Adult    DVT Prophylaxis: Pneumatic Compression Devices  Hunter Catheter: Not present  Central Lines: None  Cardiac Monitoring: None  Code Status: No CPR- Do NOT Intubate      Disposition Plan     Expected Discharge Date: 10/31/2022                The patient's care was discussed with the Attending Physician, Dr. Perez, Bedside Nurse, Care Coordinator/, Patient, Patient's Family and IR, GI, Palliative, oncology, and social work Consultant    Camilo Steinberg PA-C  Hospitalist Service, GOLD TEAM [address] [place] [address]  Securely message with the Vocera Web Console (learn more here)  Text page via [facility] Paging/Directory   Please see signed in provider for up to date coverage information      Clinically Significant Risk Factors               # Hypoalbuminemia: Lowest albumin = 1.9 g/dL (Ref range: 3.5-5.2) at 10/25/2022 11:39 PM, will monitor as appropriate           # Moderate Malnutrition: based on nutrition assessment, PRESENT ON ADMISSION       ______________________________________________________________________    Interval History   Patient remains confused and unable to provide meaningful history     Data reviewed today: I reviewed all medications, new labs and imaging results over the last 24 hours. Please see discussion of these results in the A/P.    Physical Exam     Vital Signs: Temp: 96.8  F (36  C) Temp src: Axillary BP: (!) 153/64 Pulse: 68     Resp: 16 SpO2: 98 % O2 Device: None (Room air)    Weight: 113 lbs 12.8 oz    Constitutional: Delirious. Confused.   Eyes: EOM, PERRLA. + icterus   HENT: Normocephalic, without obvious abnormality, atraumatic.   Respiratory: No increased work of breathing.    Cardiovascular: RRR. No murmur or friction rub. No edema. No chest wall tenderness.  GI: Soft, somewhat distended and with mild tenderness Bowel sounds are active.   Skin: Diffuse jaundice   Musculoskeletal:  Full range of motion noted.    Neurologic: Cranial nerves II-XII are grossly intact.     Data   Recent Labs   Lab 10/29/22  0731 10/28/22  2037 10/28/22  1809 10/28/22  1312 10/28/22  0956 10/27/22  2147 10/27/22  2008 10/26/22  0830 10/25/22  2339   WBC  --   --   --   --  6.1  --   --   --  6.5   HGB  --   --   --   --  10.3*  --   --   --  9.5*   MCV  --   --   --   --  99  --   --   --  95   PLT  --   --   --   --  360  --   --   --  320   INR  --   --   --   --  4.48*  --   --   --  2.42*   NA  --   --   --   --   --   --   --   --  139   POTASSIUM  --   --   --   --   --   --   --   --  3.2*   CHLORIDE  --   --   --   --   --   --   --   --  105   CO2  --   --   --   --   --   --   --   --  25   BUN  --   --   --   --   --   --   --   --  12   CR  --   --   --   --   --   --  0.59  --  0.63   ANIONGAP  --   --   --   --    --   --   --   --  9   LAKESHA  --   --   --   --   --   --   --   --  8.4*   * 161* 163*   < >  --    < >  --    < > 195*   ALBUMIN  --   --   --   --   --   --   --   --  1.9*   PROTTOTAL  --   --   --   --   --   --   --   --  7.0   BILITOTAL  --   --   --   --   --   --   --   --  21.0*   ALKPHOS  --   --   --   --   --   --   --   --  448*   ALT  --   --   --   --   --   --   --   --  25    < > = values in this interval not displayed.       Recent Results (from the past 24 hour(s))   CT Abdomen Pelvis w/o Contrast    Narrative    EXAMINATION: CT ABDOMEN PELVIS W/O CONTRAST, 10/28/2022 4:35 PM    TECHNIQUE:  Helical CT images from the lung bases through the  symphysis pubis were obtained without IV contrast. Contrast dose:  none. Contrast was not administered as patient declined IV contrast.    COMPARISON: The report from a CT chest abdomen and pelvis 7/25/2022 is  reviewed however images are not available for direct comparison.  Ultrasound abdomen 10/26/2022.    HISTORY: biliary obstruction r/o    FINDINGS: Exam is slightly limited by respiratory motion    Liver: Prominent left lobe of the liver. No visualized hepatic mass on  this noncontrast exam. Mildly irregular surface contours.  Biliary system: Cholecystectomy.. There is significant intrahepatic  biliary dilatation most prominent within the left lobe of the liver as  well as segments 1, 5 and 8 and to a lesser degree in segment 6 and 7  leading up to the extrahepatic biliary system. Common duct beyond this  is difficult to identify on this noncontrast CT, but  does not appear  significantly distended in the region of the pancreatic head/ampulla.  Pancreas: Normal noncontrast appearance of the pancreatic parenchyma,  no pancreatic ductal dilatation.  Stomach: Within normal limits.  Spleen: Within normal limits.  Adrenal glands: Within normal limits.  Kidneys: No focal mass, hydronephrosis, or stone.  Bladder: Within normal limits.  Reproductive organs:  Calcified uterine fibroids, surgical material in  the low pelvis likely related to prior tubal ligation.  Colon: Diverticular disease of the sigmoid colon with a mild amount of  pericolonic fat stranding and mild wall thickening.  Appendix: Not definitely visualized.  Small Bowel: Normal in caliber  Lymph nodes: No intra-abdominal or pelvic lymphadenopathy.  Vasculature: . A bandlike atherosclerosis, normal caliber of the  abdominal aorta.  Peritoneum: Small intra-abdominal ascites. No intraperitoneal free  air.     Lung bases: Small amount of basilar and right middle lobe atelectasis.  Coronary artery calcification.    Bones and soft tissues: No suspicious soft tissue mass or fluid  collection. No suspicious osseous lesion. Cranial migration of the  left total hip arthroplasty acetabular cup. Heterotopic ossification.      Impression    IMPRESSION:   1. Prominent intrahepatic biliary dilatation leading up to the  confluence of the central and left bile ducts and to a lesser degree  the right bile duct which is suspicious for obstructing tumor such as  the patient's known history of locally advanced gallbladder carcinoma.  This appears to be new from CT report 7/25/2022. Images not available  for direct comparison. If the patient is agreeable to an intravenous  contrast study, recommend follow-up CT or MRI/MRCP study.  2. Mild perihepatic and low pelvic ascites.  3. Concern for cranial rotation of the left total hip arthroplasty  acetabular cup, comparison with outside exams if they can be obtained  would be helpful to determine chronicity   4. Small amount of fat stranding about the sigmoid colon with possible  wall thickening which may be related to generalized mesenteric edema  or superimposed uncomplicated diverticulitis. Correlation with any GI  symptoms are focal tenderness in this region.  5. Limited evaluation for metastatic disease on noncontrast exam which  can be reevaluated on any follow-up contrast  study.    I have personally reviewed the examination and initial interpretation  and I agree with the findings.    DAVID LUCAS MD         SYSTEM ID:  [id number]

## 2022-10-29 NOTE — UTILIZATION REVIEW
"  [facility] Utilization Review  Admission Status; Secondary Review Determination     Admission Date: 10/25/2022 10:53 PM      Under the authority of the Utilization Management Committee, the utilization review process indicated a secondary review on the above patient.  The review outcome is based on review of the medical records, discussions with staff, and applying clinical experience noted on the date of the review.        (X) Observation Status Appropriate - This patient does not meet hospital inpatient criteria and is placed in observation status. If this patient's primary payer is Medicare and was admitted as an inpatient, Condition Code 44 should be used and patient status changed to \"observation\".   () Observation Status concurrent Review           RATIONALE FOR DETERMINATION   67-year-old female with history of hepatitis C cirrhosis of the liver, locally advanced gallbladder cancer, was transitioned to SNF based hospice care, now admitted as he no longer wanted to be on hospice cares.  Currently on oral antibiotics for E. coli UTI, confusion and edema.  Patient has refused IV and PICC line placement, CT abdomen shows prominent intra hepatic biliary dilatation, due to obstructing tumor with known history, mild perihepatic and low pelvic ascites.  Oncology team recommend no further diagnostics or therapeutics.  Family is trying to get SNF closer to home in the [facility].  Patient is on antianxiety and pain medications, which are oral.  Complex patient who has hyperbilirubinemia, related to advanced gallbladder cancer, oncology team does not recommend further work-up, mental status appears to be at baseline, now looking into SNF, does not meet criteria for inpatient stay, recommend continue observation status      The severity of illness, intensity of service provided, expected LOS make the care appropriate for observation status at this time.        The information on this document is developed " by the utilization review team in order for the business office to ensure compliance.  This only denotes the appropriateness of proper admission status and does not reflect the quality of care rendered.         The definitions of Inpatient Status and Observation Status used in making the determination above are those provided in the CMS Coverage Manual, Chapter 1 and Chapter 6, section 70.4.      Sincerely,       Josse Rutledge MD  Physician Advisor  Utilization Review-[place]    Phone: [phone]

## 2022-10-29 NOTE — PLAN OF CARE
Goal Outcome Evaluation:      Plan of Care Reviewed With: patient    Overall Patient Progress: no changeOverall Patient Progress: no change     Attendant at bedside for safety.  A&O to self, pt withdrawn.  Frequently refusing cares including VS.  Up Ax1 to commode.  No IV access.  C/o pain in abd, tolerable w/ PRN Norco.   Pt slept most of the night.     Mental status at baseline: not met  BP stable on oral medications: not met  Abx plan in place: met  Paracentesis completed: not met

## 2022-10-29 NOTE — CARE PLAN
Observation goals:     Mental status at baseline: not met  BP stable on oral medications: not met  Abx plan in place: met  Paracentesis completed: not met

## 2022-10-30 NOTE — PROVIDER NOTIFICATION
Verbally notified provider on Pt refusing labs this morning and refusing breakfast, held insulin. Provider will discontinue Labs

## 2022-10-30 NOTE — PROGRESS NOTES
Occupational Therapy Discharge Summary    Reason for therapy discharge:    All goals and outcomes met, no further needs identified.    Progress towards therapy goal(s). See goals on Care Plan in Epic electronic health record for goal details.  Goals met    Therapy recommendation(s):    Pt scored 1/30 on SLUMS, which categorizes pt's cognition in dementia category. Difficult to obtain pt's history, however should pt return home, recommend 24/7 supervision for safety and assistance for all ADLs/IADLs.        10/30/22 1000   Appointment Info   Signing Clinician's Name / Credentials (OT) Raysa Bunn OTR/L   Rehab Comments (OT) OT eval only       Present no   Language English   Living Environment   Living Environment Comments Difficult to obtain as pt is a poor historian   Self-Care   Activity/Exercise/Self-Care Comment Difficult to obtain as pt is a poor historian   Instrumental Activities of Daily Living (IADL)   IADL Comments Difficult to obtain as pt is a poor historian   General Information   Onset of Illness/Injury or Date of Surgery 10/25/22   Referring Physician Camilo Steinberg PA-C   Patient/Family Therapy Goal Statement (OT) Did not state   Additional Occupational Profile Info/Pertinent History of Current Problem Maria Guadalupe Betancourt is a 67 year old female admitted on 10/25/2022 for LE swelling in setting of cirrhosis and known gallbladder cancer, not currently on treatment. She was on hospice prior to arrival to ED, but per conversation with ED provider and family, family would like to revoke hospice status to pursue goal-directed therapy for her underlying conditions   Existing Precautions/Restrictions fall   Cognitive Status Examination   Orientation Status person  (oriented to month/date of birthday only, not year)   Affect/Mental Status (Cognitive) agitated  (when given cog assessment)   Cognitive Screens/Assessments   Cognitive Assessments Completed [facility]  "Mental Status Exam (SLUMS):  Total Score out of /30 1/30   SLUMS Norms 1-20 equals dementia   SLUMS Domains assessed: orientation, memory, attention, executive functions   SLUMS Interpretation Often stated, \"I don't know\" and \"I don't remember\"   Pain Assessment   Patient Currently in Pain No   Range of Motion Comprehensive   Comment, General Range of Motion Th observed liz sh flex >100 deg   Clinical Impression   Criteria for Skilled Therapeutic Interventions Met (OT) Evaluation only   OT Diagnosis decreased cognition   OT Problem List-Impairments impacting ADL cognition   Assessment of Occupational Performance 1-3 Performance Deficits   Clinical Decision Making Complexity (OT) low complexity   Risk & Benefits of therapy have been explained evaluation/treatment results reviewed;patient   OT Total Evaluation Time   OT Eval, Low Complexity Minutes (80385) 2   OT Goals   Therapy Frequency (OT) One time eval and treatment   OT Predicted Duration/Target Date for Goal Attainment 10/31/22   OT Goals Cognition   OT: Cognitive Patient/caregiver will verbalize understanding of cognitive assessment results/recommendations as needed for safe discharge planning   Interventions   Interventions Quick Adds Cognitive Performance Testing   OT Cognitive Perf Testing (req report)   Standardized Cognitive Testing Minutes (09110) 9   Treatment Detail/Skilled Intervention OT: Therapist administered SLUMS to assess cog skills for IND living. Pt scored 1/30, which categorizes pt's cognition in dementia category. Pt often stated, \"I don't know\" or \"I don't remember\" after prompts were read. Pt oriented to birth month/date, however disoriented to year. Pt able to respond appropriately to place using yes/no format. Pt became agitated as assessment progressed, yelling out, \"I don't want to do this anymore.\" Th provided emotional support and changed the subject to personal questions to build rapport. Pt left supine w/call light within reach and " all needs met .   OT Discharge Planning   OT Plan OT eval only   OT Rationale for DC Rec Per chart review, pt previously was in hospice but family took pt home. Difficult to obtain additional information for discharge purposes, however should pt discharge home, recommend 24/7 supervision for safety.   OT Brief overview of current status SLUMS 1/30   Total Session Time   Timed Code Treatment Minutes 9   Total Session Time (sum of timed and untimed services) 11

## 2022-10-30 NOTE — PROGRESS NOTES
"[facility]    Medicine Progress Note - Hospitalist Service, GOLD TEAM 4    Date of Admission:  10/25/2022    Assessment & Plan    Maria Guadalupe Betancourt is a 67 year old female with PMHx of gallbladder cancer, hepatitis C cirrhosis, cognitive impairment, anxiety and schizophrenia. Patient reportedly on hospice since 2 weeks PTA, however, was brought to the ED by her granddaughter reporting the patient no longer wanted to be on hospice cares. On day following admission, unable to reach any family members until late in the afternoon despite multiple attempts. Currently, managing symptomatically while treating UTI, confusion and edema.      10/30:  - Remains confused but is more verbal today than prior days. She has continued to refuse lab draws and any other interventions  - She did verbalize \"no\" when asked if she would want any any procedures performed or if she would want to be resuscitated. When asked if she has pain she says \"yes\" and points to her abdomen. When asks if she just wants us to help keep her comfortable she says \"yes\"  - OT performed cognitive eval and pt score 1/30 on SLUMS  - diagnostic for dementia  - Unable to reach family via phone yesterday or today  - Have discontinued lab draws for now as causing pt is refusing, they are causing her discomfort and are not providing significant, actionable insight   - Continue to attempt GOC conversations with family.  - SW will continue to work on placement closer to pt's family but this is likely to be difficult given her level of care with demential     # Goals of care   Please see initial discussion in my note 10/27 & Goals of care discussion from 10/28. In summary, Pt came to us while on hospice cares from her care home. These were discontinued by family as they desired some restorative cares for her abdominal pain and edema. Pt's POA is her granddaughter Jamal. Secondary contact is her 1st cousin Leena. Appreciate " pharmacy for completing [employer] rec.  - Care was discussed 10/28 with family, myself, oncology, and palliative care. Patient Made DNR/DNI   - Family is interested in some restorative cares (abx, edema control, anxiety and pain management). They are aware of terminal state of her cholangiocarcinoma. Are not interested in chemo, radiation or invasive procedures.  - Family is then interested in discharge to a care facility closer to their home in [place]. Per conversation with SW this is likely to be very difficult 2/2 to her needs for memory care and need for 24 hour care   - Patient has repeatedly refused IV and PICC placement      - Will defer on re-ordering as pt has been resistent to overall cares. Likely PICC to provide more harm than benefit given current cognitive state      # Dementia  Pt previously residing at memory care unit. reportedly had a slums < 10/30.  - Appreciate OT consult for cognitive eval - pt scored a 1/30  - Notably, this eval occurred minutes after my discussion with the patient and today has been her most lucid she has appeared in the past 3 days.  - Based upon this patient has dementia and minimal insight into her own cares  - Will continue to pursue placement with family insight.     # Gallbladder cancer  # Intrahepatic and extrahepatic biliary duct dilatation  # Ascities   See goals of care discussion above. Overall, pt has been confused since admission and declining cares from multiple specialty teams.    - Ordered OT consult for cognitive eval   - CT ordered for further evaluation of disease state 10/28. Unfortunately, d/t pt refusing peripheral line this was done w/o contrast   - Prominent intrahepatic biliary dilation suspicious for obstructing tumors, new compared to 7/25   - recommend f/u with contrast CT or MRCP   - Concern for cranial rotation of L hip arthroplasty  - Concern for possible sigmoid diverticulitis    - IR consulted for paracentesis - patient refused this 10/27   -  will consider paracentesis based on CT results   - Oncology input appreciated    - No further diagnostics or therapeutics recommended    - Attended   - GI consulted    - CT abdomen/pelvis w/out contrast as above   - Consider further intervention pending input from family  - Convert IV Lasix to PTA PO 20 mg daily   - 1:1 sitter discontinued today as medications have been converted to orals     # Hypertensive urgency   Likely acute pain contributing. BP up to 200/95 10/27 but somewhat improved today  - Restart Coreg 25 mg BID per med rec   - IV Hydralazine 10 mg q 4 PRN for SBP > 180 or DBP >110  - Starting Anxiolytics and pain control hopefully will help curb BP   - Consider starting anti-hypertensive agent tomorrow pending goals of care     # UTI   # Infectious work up  UA positive in the ED. Treating UTI may help confusion   - UCx growing E. Coli, resistent to ampiciling  - due to lost IV will transition abx to PO Keflex for another 4 days (tx started 10/27)    #T2DM  Per POA was previously on insulin but this was stopped with transition to hospice. Per med rec was taking Lantuss 35 units plus medium ssi prior to being on hospice.   - BG ranging 120-200  - Increase lantuss from 6-> 8 units   - Medium intensity Sliding scale insulin   - Hypoglycemia protocol     # Cirrhosis 2/2 Hepatitis C and Cholangiocarcinoma   MELD-Na score: 28 at 10/27/2022  8:08 PM  MELD score: 28 at 10/27/2022  8:08 PM  Calculated from:  Serum Creatinine: 0.59 mg/dL (Using min of 1 mg/dL) at 10/27/2022  8:08 PM  Serum Sodium: 139 mmol/L (Using max of 137 mmol/L) at 10/25/2022 11:39 PM  Total Bilirubin: 21.0 mg/dL at 10/25/2022 11:39 PM  INR(ratio): 2.42 at 10/25/2022 11:39 PM  Age: 67 years  - Consider diagnostic para if concern for SBP  - Daily MELD labs    # Anxiety   Pt reports anxiety as being a main determent to the patient's comfort.   - Appreciate pharmacy for completing med rec. Pt reportedly taking PO ativan 0.5 mg q 4 PRN,  Olanzapine 10 mg BID, Mirtazapine 15 mg at night, Trazodone 50 mg at night, Seroquel 100 mg at night,   - These meds had all been discontinued in the setting of hospice so she has not been taking them for > 1 month  - Ordered Trazadone 50 mg at bedtime   - Ordered PO ativan 0.5 mg q 4 PRN for anxiety   - Ordered Norco 1 tablet q 4 PRN     # Pain control   Med recs being on gabapentin 200 mg QID and 100 mg q 4 hours PRN and norco 5-325 1-2 tablets     # Schizophrenia  - pharmacy consulted for prior med rec       Diet: Regular Diet Adult    DVT Prophylaxis: Pneumatic Compression Devices  Hunter Catheter: Not present  Central Lines: None  Cardiac Monitoring: None  Code Status: No CPR- Do NOT Intubate      Disposition Plan     Expected Discharge Date: 10/31/2022                The patient's care was discussed with the Attending Physician, Dr. Perez, Bedside Nurse, Care Coordinator/, Patient, Patient's Family and IR, GI, Palliative, oncology, and social work Consultant    Camilo Steinberg PA-C  Hospitalist Service, [address]  Securely message with the Vocera Web Console (learn more here)  Text page via [facility] Paging/Directory   Please see signed in provider for up to date coverage information      Clinically Significant Risk Factors              # Hypoalbuminemia: Lowest albumin = 1.9 g/dL (Ref range: 3.5-5.2) at 10/25/2022 11:39 PM, will monitor as appropriate           # Moderate Malnutrition: based on nutrition assessment, PRESENT ON ADMISSION       ______________________________________________________________________    Interval History   Patient remains confused and unable to provide meaningful history     Data reviewed today: I reviewed all medications, new labs and imaging results over the last 24 hours. Please see discussion of these results in the A/P.    Physical Exam     Vital Signs:                     Weight: 113 lbs 12.8  oz    Constitutional: confused/demented but appears more lucid today.   Eyes: EOM, PERRLA. + icterus   HENT: Normocephalic, without obvious abnormality, atraumatic.   Respiratory: No increased work of breathing.    Cardiovascular: RRR. No murmur or friction rub. No edema. No chest wall tenderness.  GI: Soft, somewhat distended and with mild tenderness Bowel sounds are active.   Skin: Diffuse jaundice   Musculoskeletal:  Full range of motion noted.    Neurologic: Cranial nerves II-XII are grossly intact.     Data   Recent Labs   Lab 10/30/22  0803 10/30/22  0209 10/29/22  1652 10/28/22  1312 10/28/22  0956 10/27/22  2147 10/27/22  2008 10/26/22  0830 10/25/22  2339   WBC  --   --   --   --  6.1  --   --   --  6.5   HGB  --   --   --   --  10.3*  --   --   --  9.5*   MCV  --   --   --   --  99  --   --   --  95   PLT  --   --   --   --  360  --   --   --  320   INR  --   --   --   --  4.48*  --   --   --  2.42*   NA  --   --   --   --   --   --   --   --  139   POTASSIUM  --   --   --   --   --   --   --   --  3.2*   CHLORIDE  --   --   --   --   --   --   --   --  105   CO2  --   --   --   --   --   --   --   --  25   BUN  --   --   --   --   --   --   --   --  12   CR  --   --   --   --   --   --  0.59  --  0.63   ANIONGAP  --   --   --   --   --   --   --   --  9   LAKESHA  --   --   --   --   --   --   --   --  8.4*   * 124* 153*   < >  --    < >  --    < > 195*   ALBUMIN  --   --   --   --   --   --   --   --  1.9*   PROTTOTAL  --   --   --   --   --   --   --   --  7.0   BILITOTAL  --   --   --   --   --   --   --   --  21.0*   ALKPHOS  --   --   --   --   --   --   --   --  448*   ALT  --   --   --   --   --   --   --   --  25    < > = values in this interval not displayed.       No results found for this or any previous visit (from the past 24 hour(s)).

## 2022-10-30 NOTE — PROGRESS NOTES
Patient is alert and confused, multiple attempt to self transfers, bed alarm on to alert staff members due to high fall risk, denied pain or discomfort, re-oriented, encouraged to use the call light when she needs help, patient is lying in bed at this time, continue to monitor.

## 2022-10-30 NOTE — PROVIDER NOTIFICATION
Pt has been refusing meals including fluids all day with few meds. Any nutrition interventions? if possible please give me  call for a quick conversation.

## 2022-10-30 NOTE — PROGRESS NOTES
Patient is alert and forgetful, sleeping fomfortably, denied pain or discomfort, cooperative with cares, stable.

## 2022-10-30 NOTE — PROVIDER NOTIFICATION
Pt refused INR lab draw 1600, refused AM lab draws as well and providers were verbally notified during patient visit/rounding

## 2022-10-30 NOTE — PLAN OF CARE
Goal Outcome Evaluation:      Plan of Care Reviewed With: patient          Outcome Evaluation: Pt intermittently confusaed otherwise A&O, VSS on RA  refused 10pm VS and BG check, c/o pain, oral narco given x1,  DNR, urgency with voiding, pt poor apettite, bed alarm on,

## 2022-10-30 NOTE — PLAN OF CARE
[facility]      OUTPATIENT OCCUPATIONAL THERAPY  EVALUATION  PLAN OF TREATMENT FOR OUTPATIENT REHABILITATION  (COMPLETE FOR INITIAL CLAIMS ONLY)  Patient's Last Name, First Name, M.I.  YOB: 1955  Maria Guadalupe Betancourt                          Provider's Name  [facility] Medical Record No.  [id number]                               Onset Date:  10/25/22   Start of Care Date:        Type:     ___PT   _X_OT   ___SLP Medical Diagnosis:                           OT Diagnosis:  decreased cognition   Visits from SOC:  1   _________________________________________________________________________________  Plan of Treatment/Functional Goals    Planned Interventions:     Goals: See Occupational Therapy Goals on Care Plan in [employer] electronic health record.    Therapy Frequency: One time eval and treatment  Predicted Duration of Therapy Intervention: 10/31/22  _________________________________________________________________________________    I CERTIFY THE NEED FOR THESE SERVICES FURNISHED UNDER        THIS PLAN OF TREATMENT AND WHILE UNDER MY CARE     (Physician co-signature of this document indicates review and certification of the therapy plan).               ,      Referring Physician: Camilo Steinberg, SHYLA            Initial Assessment        See Occupational Therapy evaluation dated   in Epic electronic health record.

## 2022-10-30 NOTE — PLAN OF CARE
Goal Outcome Evaluation:      Plan of Care Reviewed With: patient          Outcome Evaluation: Pt disoriented x4, VSS on RA refused breakfast and lunch meals, held insulin and verbally updated provider, non verbal indicators of pain present , pain meds offered but pt declines and refused to take her noon abx, jaundiced, DNR, urgency with voiding, bed alarm on, increased weakness today.

## 2022-10-30 NOTE — PLAN OF CARE
"BP (!) 153/64 (BP Location: Right arm)   Pulse 68   Temp 96.8  F (36  C) (Axillary)   Resp 16   Ht 1.626 m (5' 4\")   Wt 51.6 kg (113 lb 12.8 oz)   SpO2 98%   BMI 19.53 kg/m       Time: 1190-8088    Reason for admission: Cirrhosis of liver with ascites.   Activity: Assist of one person.   Pain: no c/o pain or discomfort.   Neuro: alert and forgetful.   Cardiac: WDL  Resp: denied SOB, lung sounds diminished bilaterally.   GI/: Regular diet, voids without difficulties.   Lines: no IV access.   Skin: WDL X jaundiced.     Labs/Imaging: BG at 0200, 124  New changes to shift: No change.   Plan: Continue with current plan of cares.                         "

## 2022-10-31 NOTE — PROGRESS NOTES
[facility]  Palliative Care Daily Progress Note       Recommendations & Counseling       DNR/DNI, patient refusing majority of cares and per family have requested restorative cares    Appreciate primary team attempts to update family and discuss next steps for her, seems that based on our testing we can give them a better picture of her cancer and cirrhosis state and how that affects prognosis, I worry she is in her final days-weeks. Further GOC discussions with family are appropriate.    Primary team able to reach daughter today, will attempt to connect with her again in the coming day as well.           Assessments          Maria Guadalupe Betancourt is a 67 year old female who presented with LE swelling in the setting of cirrhosis and known gallbladder cancer, not on treatment. Patient was on hospice prior to presentation to the ED.      Today, the patient was seen for:  Gallbladder cancer  Cirrhosis  AMS  UTI    Prognosis, Goals, or Advance Care Planning was addressed today with: Yes.    Family would benefit from an update on how she has changed in terms of her cancer and cirrhosis since their last impression was that these conditions were not that much progressed.     Mood, coping, and/or meaning in the context of serious illness were addressed today: Yes.  Summary/Comments: sleeping comfortably, refusing cares and challenging to get cares and therapies given her agitation at times.            Interval History:     Chart review/discussion with unit or clinical team members:   Notes reviewed, refusing lab draws, having abdominal pain.    Per patient or family/caregivers today:  Patient resting comfortably in bed during visit, today per nurse has refused all cares and does not want to eat.     Key Palliative Symptoms:  We are not helping to manage these symptoms currently in this patient.               Review of Systems:     Besides above, an additional ROS unable to be  completed as patient unable to participate in interview.          Medications:     I have reviewed this patient's medication profile and medications during this hospitalization.    Noted meds:    Lidocaine patch  Trazodone 50 mg daily  norco PRN- x2 over last 24 hours             Physical Exam:   Vitals were reviewed  Temp: 97.3  F (36.3  C) Temp src: Axillary BP: (!) 153/64 Pulse: 63   Resp: 16 SpO2: 100 % O2 Device: None (Room air)      Intake/Output Summary (Last 24 hours) at 10/31/2022 0918  Last data filed at 10/30/2022 1344  Gross per 24 hour   Intake 80 ml   Output --   Net 80 ml   Constitutional: Awake, alert, cooperative, chronically ill appearing, jaundiced, no apparent distress  ENT: Normocephalic, without obvious abnormality, atramatic  Lungs: No increased work of breathing, good air exchange  Musculoskeletal: No redness, warmth, or swelling of the joints.   Neurologic: Awake, alert, oriented to self  Neuropsychiatric: Normal affect, mood  Skin: No rashes, erythema, jaundiced           Data Reviewed:     Reviewed recent pertinent imaging, comments:   IMPRESSION:   1. Prominent intrahepatic biliary dilatation leading up to the   confluence of the central and left bile ducts and to a lesser degree   the right bile duct which is suspicious for obstructing tumor such as   the patient's known history of locally advanced gallbladder carcinoma.   This appears to be new from CT report 7/25/2022. Images not available   for direct comparison. If the patient is agreeable to an intravenous   contrast study, recommend follow-up CT or MRI/MRCP study.   2. Mild perihepatic and low pelvic ascites.   3. Concern for cranial rotation of the left total hip arthroplasty   acetabular cup, comparison with outside exams if they can be obtained   would be helpful to determine chronicity   4. Small amount of fat stranding about the sigmoid colon with possible   wall thickening which may be related to generalized mesenteric edema    or superimposed uncomplicated diverticulitis. Correlation with any GI   symptoms are focal tenderness in this region.   5. Limited evaluation for metastatic disease on noncontrast exam which   can be reevaluated on any follow-up contrast study.     I have personally reviewed the examination and initial interpretation   and I agree with the findings.     Reviewed recent labs, comments:   No recent labs, patient has been refusing.        SYLVIA Garcia CNS  Palliative Care Consult Team  Pager: [phone]    25 minutes spent. This includes 15 minutes face to face with patient and care team discussing current complex health conditions and prognosis, goals of care. Coordination of care with the primary team, palliative  and SW, and RN regarding goals of care and symptom management.

## 2022-10-31 NOTE — PROGRESS NOTES
SPIRITUAL HEALTH SERVICES  SPIRITUAL ASSESSMENT Progress Note (Palliative Focus)  [facility] ([place] 5A    REFERRAL SOURCE: Palliative care initial spiritual assessment.    Patient Maria Guadalupe Betancourt's family declines  visits at this time, on her behalf, per Palliative Care CNS Solomon.     Plan: No follow up planned unless spiritual or meaning-based support is requested; I am available for support as needed while Palliative Care is consulted.    Amanda Estrada  Palliative   Pager [phone]  [facility] Inpatient Team Consult pager [phone] (M-F 8-4:30)  After-hours Answering Service [phone]

## 2022-10-31 NOTE — PLAN OF CARE
"Goal Outcome Evaluation:      Plan of Care Reviewed With: patient          Outcome Evaluation: Pt alert, mildly confused, vss on ra, been refusing cares, providers aware, bed alarm on, SBA, denies pain, occassionally moans and repeatedly says \"help me\" but can't express self what she wants to be done.      "

## 2022-10-31 NOTE — PROGRESS NOTES
"Blood pressure (!) 167/71, pulse 67, temperature 97.5  F (36.4  C), temperature source Oral, resp. rate 18, height 1.626 m (5' 4\"), weight 51.6 kg (113 lb 12.8 oz), SpO2 97 %.    Goal Outcome Evaluation:     Plan of Care Reviewed With: patient   Overall Patient Progress: No change     Hypertensive in 160s, OH stable in 60s. No reports of headache. Satting >94% on RA. Pt only alert to self overnight. Pt on bed alarm. Up and moaning, repeating \"help me\" c/o abdominal pain. Norco given w/ relief in abdominal pain where pt is able to fall asleep. Pt w/ x2 BMs overnight. Continue POC     "

## 2022-10-31 NOTE — PROGRESS NOTES
Gastroenterology Inpatient Sign Off Note    67 year old female with a history of cirrhosis, gallbladder cancer, T2DM, schizophrenia, CVA, HCV, polysubstance abuse, prior left femoral fracture s/p total hip replacement who presented to the ED on 10/26 with jaundice and lower extremity swelling. GI consulted for management of biliary dilatation.      #Gallbladder cancer  #Intrahepatic and extrahepatic biliary duct dilatation  #Goals of care discussions     Patient was diagnosed with gallbladder cancer on 04/2022 after laparoscopic cholecystectomy for acute cholecystitis. Noted to have T3 gallbladder cancer with negative margins, but no nodes were resected, tumor close to gallbladder fossa but no obvious invasion into hepatic parenchyma. Seen by Dr. Rocha (hepatobiliary and pancreatic surgery) on 07/25, she would need complete radical cholecystectomy and lymphadenectomy with re-evaluation of cystic duct margin, but because of multiple comorbidities, surgery was not offered. Chemotherapy was not offered by Oncology, patient was placed on hospice due to overall condition.      Labs revealed , total bili 21, and ultrasound of the abdomen with CBD dilation at 17 mm, both intrahepatic and extrahepatic biliary dilatation.    Goals of care remain unclear, patient with advanced dementia, not allowing any healthcare provider offer meds or procedures, draw labs etc.      #Cirrhosis     Current MELD-Na score 28, plan to undergo diagnostic paracentesis by IR today. Likely cause of lower extremity swelling. No plans for liver transplantation from hepatology service.         RECOMMENDATIONS    - Goals of care remain unclear, patient with advanced dementia, not allowing any healthcare provider offer meds or procedures, draw labs etc. Unlikely patient would be amenable to getting an ERCP, but also family not open to invasive procedures per prior conversation (per chart review).     Thank you for involving us in this  patient's care. Please do not hesitate to contact the GI service with any questions or concerns.     Inpatient GI consults service will sign off. No further recommendations at this time. If primary team has addition questions, please page consult fellow listed in Eric.    Current GI Consult Staff: Dr. Swift     Follow up recommendations:   No outpatient GI clinic follow-up indicated. Follow-up with primary care provider at timing determined by discharge physician.      Werner Rees MD  Gastroenterology Fellow  Division of Gastroenterology, Hepatology and Nutrition  [facility]  [id number]  See ERIC/[facility] for GI on-call information

## 2022-10-31 NOTE — PLAN OF CARE
Goal Outcome Evaluation:      Plan of Care Reviewed With: patient          Outcome Evaluation: Pt confused but alert to sefl, vss on ra, been refusing cares, providers aware with few changes to pt orders, bed alarm on, SBA, denies pain

## 2022-10-31 NOTE — PROGRESS NOTES
[facility]    Medicine Progress Note - Hospitalist Service, GOLD TEAM 4    Date of Admission:  10/25/2022    Assessment & Plan    Maria Guadalupe Betancourt is a 66 yo F with PMHx of gallbladder cancer, HCV cirrhosis, IDDM2, Hx of CVA, anxiety, cognitive impairment, and schizophrenia, recently initiated on hospice, brought into the ED for LE edema and jaundice admitted on 10/25/2022 with family changes in GOC to pursue goal directed therapy.     # GOC  - Please see GOC discussion and care conference documentation on 10/28 for details. Patient is not decisional, and grand-daughter Flip assisting with medical decision making. Family is interested in some restorative cares, but aware of terminal state of cancer, and not interested in chemo/radiation, or invasive procedures.    - Appreciate palliative care assistance   - DNR/DNI, no ICU  - continue current treatments, with focus on palliative and comfort (abx, edema control, treatment of anxiety and pain)   - Family goals are not aligned with hospice at this time  - Patient refusing labs. Discussed with family, okay with no blood draws   - Appreciate  assistance with placement, needs memory care unit with 24 hours care     # Worsening jaundice and LE edema  # Gallbladder cancer   # Intrahepatic and extrahepatic biliary duct dilation   # HCV Cirrhosis  Patient dx with T3 gallbladder cancer after laparoscopic cholecystectomy for acute cholecystitis 4/2022. Patient previously evaluated by hepatobiliary and pancreatic surgery and oncology and no surgical or chemotherapy treatments were offered due to multiple co-morbidities and placed on hospice two months ago.  Patient brought in by family with worsening jaundice and LE edema and revoked hospice care. Elevated alk phos 400s, and T bili 21 concerning for progression of disease with worsening liver failure with MELD 28 and INR 2.42 Abdominal US with CBD dilation 17 mm concerning for  Pharmacy Note - Admission Medication History    Pertinent Provider Information:   [facility] on [facility] in [place].  On admission to ED patient reported had not taken medications for past 7 days. ______________________________________________________________________    Prior To Admission (PTA) med list completed and updated in EMR.       PTA Med List   Medication Sig Last Dose     divalproex sodium extended-release (DEPAKOTE ER) 500 MG 24 hr tablet Take 500 mg by mouth At Bedtime Unknown at Unknown time     docusate sodium (DSS) 100 MG capsule Take 100 mg by mouth daily as needed Unknown at Unknown time     hydrOXYzine (ATARAX) 50 MG tablet Take 50 mg by mouth every 6 hours as needed for anxiety Unknown at Unknown time     omeprazole (PRILOSEC) 40 MG DR capsule Take 40 mg by mouth daily Unknown at Unknown time     QUEtiapine (SEROQUEL) 50 MG tablet Take 150 mg by mouth At Bedtime Unknown at Unknown time     traZODone (DESYREL) 100 MG tablet Take 100 mg by mouth nightly as needed for sleep Unknown at Unknown time       [facility]  Method of interview communication: N/A        In the past week, patient estimated taking medication this percent of the time:  less than 50% due to other.    Allergies were reviewed, assessed.   Obtained from Hospitalization on 12/22/21 in [place].    Patient does not use any multi-dose medications prior to admission.    The information provided in this note is only as accurate as the sources available at the time of the update(s).    Thank you for the opportunity to participate in the care of this patient.    Helen Hernandez RPH  1/14/2022 8:00 AM       biliary obstruction from progressive cancer. Evaluated by GI who recommended CT abd/pelvis with intrahepatic biliary dilation suspicious for obstructing tumors and possible diverticulitis. GI considered palliative stenting but family not interested invasive procedures at this time. Oncology consulted would not offer any cancer directed therapy at this time. Attempted to get paracentesis to evaluate for SBP, but patient refused.   - Appreciate GI, oncology and IR consult  - Continue PTA Lasix 20 mg daily for edema  - Pain management: Norco 5-325 mg, 1-2 tablets Q4H PRN and gabapentin 200 mg QID    # UTI - UA with + nitrites, LE and bacteria. Patient unable to tell if she has symptoms. UCx + for E. Coli.   - Started on Ceftriaxone, transition to Keflex but borderline sensitive. Switch to Cefdinir 300 mg BID x3 days.      # Dementia - Patient previously residing in memory care unit with prior SLUMS score reportedly <10/30. OT consulted and repeat SLUM this admission 1/30. Patient does not have capacity to make her own decision. Family assisting with medical decision making. Patient requires 24/7 care.     # Anxiety, Schizophrenia - Patient previously on Ativan 0.5 mg Q4H PRN, olanzapein 10 mg BID, mirtazapine 15 mg at bedtime, Seroquel 100 mg at bedtime, and trazodone 50 mg at bedtime, but stopped when transitioned to hospice.   - Continue ativan 0.5 mg Q4H PRN anxiety, and trazodone 50 mg at bedtime     # HTN - Continue PTA coreg 25 mg BID and PO hydralazine 10 mg Q4H PRN for SBP >180 and DBP >110     # DM2 - previously on insulin (Lantus 35 Units daily with Novolog sliding scale insulin), but stopped when transitioned to hospice. Patient not eating and refusing meals.   - Stop Lantus and Novolog carb coverage   - Continue Novolog medium sliding scale insulin   - Hypoglycemia protocol        Diet: Regular Diet Adult    DVT Prophylaxis: Pneumatic Compression Devices  Hunter Catheter: Not present  Central Lines:  None  Cardiac Monitoring: None  Code Status: No CPR- Do NOT Intubate      Disposition Plan      Expected Discharge Date: 11/03/2022        Discharge Comments: Difficult discharge due to pt's cognitive state and progressive cancer plus family desires to have pt closer to home        The patient's care was discussed with the Attending Physician, Dr. Valencia Perez, Bedside Nurse, Patient and Palliative and GI Consultant.    Yani Callahan PA-C  Hospitalist Service, GOLD TEAM [address] [place] [address]  Securely message with the Vocera Web Console (learn more here)  Text page via [facility] Paging/Directory   Please see signed in provider for up to date coverage information      Clinically Significant Risk Factors              # Hypoalbuminemia: Lowest albumin = 1.9 g/dL (Ref range: 3.5-5.2) at 10/25/2022 11:39 PM, will monitor as appropriate           # Moderate Malnutrition: based on nutrition assessment, PRESENT ON ADMISSION       ______________________________________________________________________    Interval History   Patient is a poor historian. Denies any pain, fevers, chills, CP, SOB, or N/V. Patient refusing labs and cares. Patient refusing PO intake.     Data reviewed today: I reviewed all medications, new labs and imaging results over the last 24 hours.    Physical Exam   Vital Signs: Temp: 97.3  F (36.3  C) Temp src: Oral BP: (!) 150/62 Pulse: 67   Resp: 18 SpO2: 97 % O2 Device: None (Room air)    Weight: 113 lbs 12.8 oz   GENERAL: Sleepy, but awakes to voice. Answering occasional questions. NAD.   HEENT: Icteric sclera. Mucous membranes moist   CARDIOVASCULAR: RRR. S1, S2. No murmurs, rubs, or gallops.   RESPIRATORY: Effort normal on RA. Clear to auscultation bilaterally, no rales, rhonchi or wheezes, respirations unlabored   GI: Abdomen soft, TTP RUQ without any rebound tenderness or guarding. Mass palpable in RUQ.  hypoactive bowel sounds present  EXTREMITIES: No  peripheral edema. No calf asymmetry, erythema, or tenderness.   NEUROLOGICAL:  CN II-XII grossly intact. Moving all extremities symmetrically.   SKIN: Intact. Warm and dry. + Jaundice.     Data   Recent Labs   Lab 10/31/22  1417 10/31/22  1116 10/31/22  0058 10/28/22  1312 10/28/22  0956 10/27/22  2147 10/27/22  2008 10/26/22  0830 10/25/22  2339   WBC  --   --   --   --  6.1  --   --   --  6.5   HGB  --   --   --   --  10.3*  --   --   --  9.5*   MCV  --   --   --   --  99  --   --   --  95   PLT  --   --   --   --  360  --   --   --  320   INR  --   --   --   --  4.48*  --   --   --  2.42*   NA  --   --   --   --  137  --   --   --  139   POTASSIUM  --   --   --   --  2.9*  --   --   --  3.2*   CHLORIDE  --   --   --   --  97*  --   --   --  105   CO2  --   --   --   --  17*  --   --   --  25   BUN  --   --   --   --  11.8  --   --   --  12   CR  --   --   --   --  0.69  --  0.59  --  0.63   ANIONGAP  --   --   --   --  23*  --   --   --  9   LAKESHA  --   --   --   --  9.4  --   --   --  8.4*   * 121* 120*   < > 136*   < >  --    < > 195*   ALBUMIN  --   --   --   --  2.8*  --   --   --  1.9*   PROTTOTAL  --   --   --   --  6.8  --   --   --  7.0   BILITOTAL  --   --   --   --  23.1*  --   --   --  21.0*   ALKPHOS  --   --   --   --  428*  --   --   --  448*   ALT  --   --   --   --  23  --   --   --  25   AST  --   --   --   --  72*  --   --   --   --     < > = values in this interval not displayed.     No results found for this or any previous visit (from the past 24 hour(s)).  Medications       carvedilol  25 mg Oral BID w/meals     cefdinir  300 mg Oral Q12H ZAIRA (08/20)     furosemide  20 mg Oral Daily     insulin aspart  1-3 Units Subcutaneous TID AC     insulin aspart  1-3 Units Subcutaneous At Bedtime     lidocaine  1 patch Transdermal Q24H     lidocaine   Transdermal Q8H ZAIRA     sodium chloride (PF)  3 mL Intracatheter Q8H     sodium chloride (PF)  68 mL Intravenous Once     traZODone  50 mg Oral At  Bedtime

## 2022-11-01 NOTE — PLAN OF CARE
Goal Outcome Evaluation:      Plan of Care Reviewed With: patient      Pt A&O to self. Confused. Getting out of bed a lot. Bed alarm on at all times. Took all medictions this shift. SBA. C/o pain and PRN pain medication given. VSS on RA. no other changes this shift. Refuses cares.

## 2022-11-01 NOTE — PROGRESS NOTES
[facility]  Palliative Care Daily Progress Note       Recommendations & Counseling       DNR/DNI, reconsidering hospice    Unable to reach granddaughter today, does seem that they have overall goals of comfort focused treatment however it would be helpful to clarify their thoughts on bringing her back to the hospital and to the clinics for ongoing cares. If they are no longer interested in rehospitalization and further imaging/testing then would be very appropriate for hospice to be involved in her care.    Will attempt again tomorrow to connect with patient's granddaughter    Agree with use of high concentration SL options for her symptom management given more difficulty taking pills currently and her AMS          Assessments          Maria Guadalupe Betancourt is a 67 year old female who presented with LE swelling in the setting of cirrhosis and known gallbladder cancer, not on treatment. Patient was on hospice prior to presentation to the ED.      Today, the patient was seen for:  Gallbladder cancer  Cirrhosis  AMS  UTI    Prognosis, Goals, or Advance Care Planning was addressed today with: Yes.  Mood, coping, and/or meaning in the context of serious illness were addressed today: No.  Summary/Comments: discussed this case with primary team today. Reviewed that over the last two weeks patient has been on the decline and discussed that her prognosis is likely in the measure of days-weeks and not months. Discussed that family has been wanting to discharge either to LTC closer to family versus taking her home. Plans are currently underway to attempt to get her out of the hospital. Family also has agreed to no further labs and does not want to put her through a ERCP at this point. Discussed that previously their goals were to keep bringing her to the hospital and also to continue to get imaging to help assess how progressed her cancer is. Its unclear if these goals have changed  with the most recent assessment of prognosis. Patient would be very appropriate for hospice services should family be clear they are no longer wanting to take her back to the hospital and dont indent on pursuing continued medical management in the clinic with various doctors.            Interval History:     Chart review/discussion with unit or clinical team members:   Notes reviewed, no acute events, family agreeable to stopping labs and pursuing discharge.    Per patient or family/caregivers today:  Patient sleeping, family not present at this time of visit. Patient appears comfortable.    Key Palliative Symptoms:                 Review of Systems:     Besides above, an additional ROS unable to be completed as patient unable to participate in interview.             Medications:     I have reviewed this patient's medication profile and medications during this hospitalization.    Noted meds:    Lidocaine patch  Norco PRN- x2             Physical Exam:   Vitals were reviewed  Temp: 97.8  F (36.6  C) Temp src: Oral BP: (!) 151/71 Pulse: 61   Resp: 16 SpO2: 98 % O2 Device: None (Room air)      Intake/Output Summary (Last 24 hours) at 11/1/2022 1021  Last data filed at 10/31/2022 1330  Gross per 24 hour   Intake 240 ml   Output --   Net 240 ml     Constitutional: resting comfortably, chronically ill appearing, jaundiced, no apparent distress  ENT: Normocephalic, without obvious abnormality, atramatic  Lungs: No increased work of breathing, good air exchange  Musculoskeletal: No redness, warmth, or swelling of the joints.   Neurologic: patient sleeping, deferred  Skin: No rashes, erythema, jaundiced             Data Reviewed:     Reviewed recent pertinent imaging, comments:   CT abd/pelvis 10/28  IMPRESSION:   1. Prominent intrahepatic biliary dilatation leading up to the   confluence of the central and left bile ducts and to a lesser degree   the right bile duct which is suspicious for obstructing tumor such as   the  patient's known history of locally advanced gallbladder carcinoma.   This appears to be new from CT report 7/25/2022. Images not available   for direct comparison. If the patient is agreeable to an intravenous   contrast study, recommend follow-up CT or MRI/MRCP study.   2. Mild perihepatic and low pelvic ascites.   3. Concern for cranial rotation of the left total hip arthroplasty   acetabular cup, comparison with outside exams if they can be obtained   would be helpful to determine chronicity   4. Small amount of fat stranding about the sigmoid colon with possible   wall thickening which may be related to generalized mesenteric edema   or superimposed uncomplicated diverticulitis. Correlation with any GI   symptoms are focal tenderness in this region.   5. Limited evaluation for metastatic disease on noncontrast exam which   can be reevaluated on any follow-up contrast study.     I have personally reviewed the examination and initial interpretation   and I agree with the findings.     Reviewed recent labs, comments:   Glucose 133     SYLVIA Garcia CNS  Palliative Care Consult Team  Pager: [phone]    15 minutes spent. This includes 10 minutes face to face with patient and medical team discussing current complex health conditions and prognosis, goals of care. Coordination of care with the primary team regarding goals of care and symptom management.

## 2022-11-01 NOTE — PROGRESS NOTES
Care Management Follow Up    Length of Stay (days): 1    Expected Discharge Date: TBD     Concerns to be Addressed: Discharge Planning, writer spoke with patients [facility] Nurse Andie today (11/1) she stated, decision has been made that patient may not return to their facility unless she is DNR/DNI. Patient does not have to be signed onto hospice although they do feel that is most appropriate.     Education Provided on the Discharge Plan on Friday 10/28:  Granddaughter asked about finding placement closer ([facility] is 45 minutes away for them). Writer explained that without patient signed onto hospice she falls into a placement category of long term care (LTC), patients dementia diagnosis would require a locked memory care unit. Finding a LTC facility with an open bed in a memory care unit is extremely difficult and time consuming (typically taking months). Writer explained to granddaughter that it is not appropriate use of hospital resources for patient to remain here, when patient has a safe discharge plan with returning to [facility] (has bedhold, started 10/22, ends 11/8).     Patient/Family in Agreement with the Plan: not really, granddaughter continues to speak to staff about finding a new facility for patient to discharge to.    Referrals Placed by CM:  None    Patient plan of care discussed with Gold 4 Team: Yes    Anticipated Discharge Disposition:  Return to [facility] vs family members home    [facility]  [address] [place] [address]  Phone: [phone]  On day of discharge to [facility] patient needs to arrive before 2pm, orders need to be faxed by 10am max on day of discharge. [facility] accepts patient only Monday through Friday (no nurse on weekends).    Anticipated Discharge Services:  TBD  Community Services still involved in patients care:  [facility] Care Coordinator (through [employer]) Екатерина Berg  [facility]  Cathryn Perez (ph: [phone])      [facility] has  discharged patient due to lack of contact and participation by/from family. Initially signed onto hospice 9/2022. Patient was receiving sublingual dilaudid and ativan, for agitation and pain, prescribed by Hospice. [facility] does not have a prescriber for these medications unless patient is sign on with hospice.    Anticipated Discharge DME:  ELLIE Lerma had a hospital bed through hospice and they are picking it up today 10/28. She ll need a hospital bed (ordered through insurance) for [facility] at discharge.      Patient/family educated on Medicare website which has current facility and service quality ratings:  N/A      Private pay costs discussed: Not applicable, [facility]      Leelee Ritter RN, BSN, PHN  Nurse Care Coordinator  Unit 5A, Rooms 5211-1 to 5219  Unit 5C, Rooms 0912-0155  [facility]   Desk phone & confidential voicemail: [phone]  Mon-Fri Pager: [phone]    To contact the On-call Weekend RNCC  [place] (0800 - 1630) Saturday and Sunday    Units: 4A, 4C, 4E, 5A and 5B - Pager 1: [phone]    Units: 6A, 6B, 6C, and 6D - Pager 2: [phone]    Units: 7A, 7B, 7C, 7D, and 5C- Pager 3: [phone]    [facility] ([phone]) Saturday and Sunday    Units: 5 Ortho, 8A, 10 ICU, & Pediatric Units-Pager 4: [phone]          Granddaughter Jamal - [facility],   alterative agent is Cousin Roselyn

## 2022-11-01 NOTE — PLAN OF CARE
Goal Outcome Evaluation:  Patient alert, oriented to self but disoriented to time place and situation, poor appetite, ambulates to bathroom assist one one with walker, BM today.

## 2022-11-01 NOTE — PLAN OF CARE
Goal Outcome Evaluation:      Plan of Care Reviewed With: patient    Overall Patient Progress: no changeOverall Patient Progress: no change    Outcome Evaluation: VSS ex HTN on room air. Oriented to self; todd other orientation questions. Patient intermittently answers questions. Intermittently uncooperative and agitated. Incontient of bowel and bladder. Prn Norco given for pain. Bed alarm on for safety. A1 to bathroom. Refuses repositioning.

## 2022-11-01 NOTE — PROGRESS NOTES
[facility]    Medicine Progress Note - Hospitalist Service, GOLD TEAM 4    Date of Admission:  10/25/2022    Assessment & Plan    Maria Guadalupe Betancourt is a 66 yo F with PMHx of gallbladder cancer, HCV cirrhosis, IDDM2, Hx of CVA, anxiety, cognitive impairment, and schizophrenia, recently initiated on hospice, brought into the ED for LE edema and jaundice admitted on 10/25/2022 with family changes in GOC to pursue goal directed therapy.     # GOC  - Please see GOC discussion and care conference documentation on 10/28 for details. Patient is not decisional, and grand-daughter Flip assisting with medical decision making. Family is interested in some restorative cares, but aware of terminal state of cancer, and not interested in chemo/radiation, or invasive procedures.    - Appreciate palliative care assistance, to discuss hospice and further GOC   - DNR/DNI, no ICU  - continue current treatments, with focus on palliative and comfort (abx, edema control, treatment of anxiety and pain)   - Family considering hospice, and would like to discuss with palliative   - Patient refusing labs. Discussed with family, okay with no blood draws   - Appreciate  assistance with placement, needs memory care unit with 24 hours care or home with family who will provide 24/7 care     # Worsening jaundice and LE edema  # Gallbladder cancer   # Intrahepatic and extrahepatic biliary duct dilation   # HCV Cirrhosis  Patient dx with T3 gallbladder cancer after laparoscopic cholecystectomy for acute cholecystitis 4/2022. Patient previously evaluated by hepatobiliary and pancreatic surgery and oncology and no surgical or chemotherapy treatments were offered due to multiple co-morbidities and placed on hospice two months ago.  Patient brought in by family with worsening jaundice and LE edema and revoked hospice care. Elevated alk phos 400s, and T bili 21 concerning for progression of disease with  worsening liver failure with MELD 28 and INR 2.42 Abdominal US with CBD dilation 17 mm concerning for biliary obstruction from progressive cancer. Evaluated by GI who recommended CT abd/pelvis with intrahepatic biliary dilation suspicious for obstructing tumors and possible diverticulitis. GI considered palliative stenting but family not interested invasive procedures at this time. Oncology consulted would not offer any cancer directed therapy at this time. Attempted to get paracentesis to evaluate for SBP, but patient refused.   - Appreciate GI, oncology and IR consult  - Change Lasix 20 mg daily PRN for edema  - Pain management: Norco 5-325 mg, 1-2 tablets Q4H PRN and gabapentin 200 mg QID. If pain not controlled with Norco will switch to Roxanol     # UTI - UA with + nitrites, LE and bacteria. Patient unable to tell if she has symptoms. UCx + for E. Coli.   - Continue Cefdinir 300 mg BID x3 days.      # Dementia - Patient previously residing in memory care unit with prior SLUMS score reportedly <10/30. OT consulted and repeat SLUM this admission 1/30. Patient does not have capacity to make her own decision. Family assisting with medical decision making. Patient requires 24/7 care.     # Anxiety, Schizophrenia - Patient previously on Ativan 0.5 mg Q4H PRN, olanzapein 10 mg BID, mirtazapine 15 mg at bedtime, Seroquel 100 mg at bedtime, and trazodone 50 mg at bedtime, but stopped when transitioned to hospice.   - Continue ativan 0.5 mg Q4H PRN anxiety, and trazodone 50 mg at bedtime     # HTN - Continue PTA coreg 25 mg BID and PO hydralazine 10 mg Q4H PRN for SBP >180 and DBP >110     # DM2 - previously on insulin (Lantus 35 Units daily with Novolog sliding scale insulin), but stopped when transitioned to hospice. Patient not eating and refusing meals, so stop Lantus and Novolog carb coverage.   - Continue Novolog medium sliding scale insulin   - Hypoglycemia protocol        Diet: Regular Diet Adult    DVT  "Prophylaxis: Pneumatic Compression Devices  Hunter Catheter: Not present  Central Lines: None  Cardiac Monitoring: None  Code Status: No CPR- Do NOT Intubate      Disposition Plan    1-2 days, likely home with family     The patient's care was discussed with the Attending Physician, Dr. Elvi Gutiérrez, Bedside Nurse, Patient and Palliative care Consultant.    Yani Callahan PA-C  Hospitalist Service, GOLD TEAM [address] [place] [address]  Securely message with the Vocera Web Console (learn more here)  Text page via [facility] Paging/Directory   Please see signed in provider for up to date coverage information      Clinically Significant Risk Factors              # Hypoalbuminemia: Lowest albumin = 1.9 g/dL (Ref range: 3.5-5.2) at 10/25/2022 11:39 PM, will monitor as appropriate           # Moderate Malnutrition: based on nutrition assessment, PRESENT ON ADMISSION       ______________________________________________________________________    Interval History   Patient poor historian, unable to obtain history. Does not answer questions appropriately, just yells \"No!\" and \"Leave me alone!\".  Patient intermittently refusing cares. Patient refusing PO intake.     Updated family, Flip on the phone this morning. Updated on patient's condition, that patient is sleeping most of the day and not eating. Clarified with family, goal on further interventions like palliative stent with GI, and agreed that if patient could not tolerate PIV, she would likely not tolerate invasive procedure that would require an IV. Discussed that if patient continues to not eat, her life expectancy would likely be days to weeks. Family is understanding and very clear they want to place comfort as a priority. Discussed switching some mediations to concentrated liquid, so easier for her to take. Did discuss a PICC if needed, and would only pursue if patient could not take any oral medications. Discussed placement, and " family is not interested in patient returning to [facility], because of distance from family. They want family time a priority, and willing to take patient home and provide 24/7 care. Re-discussed hospice, and they said with patient's trajectory, they would consider hospice and would like to discuss with palliative care further.     Data reviewed today: I reviewed all medications, new labs and imaging results over the last 24 hours.    Physical Exam   Vital Signs: Temp: 97.8  F (36.6  C) Temp src: Oral BP: (!) 151/71 Pulse: 61   Resp: 16 SpO2: 98 % O2 Device: None (Room air)    Weight: 113 lbs 12.8 oz   GENERAL: Sleepy, but awakes to voice. NAD. Appears comfortable.    HEENT: Icteric sclera. Mucous membranes moist   CARDIOVASCULAR: RRR. S1, S2. No murmurs, rubs, or gallops.   RESPIRATORY: Effort normal on RA. Clear to auscultation bilaterally, no rales, rhonchi or wheezes, respirations unlabored   GI: Abdomen soft, non-tender, without any rebound tenderness or guarding. Mass palpable in RUQ.  hypoactive bowel sounds present  EXTREMITIES: No peripheral edema. No calf asymmetry, erythema, or tenderness.   NEUROLOGICAL:  CN II-XII grossly intact. Moving all extremities symmetrically.   SKIN: Intact. Warm and dry. + Jaundice.     Data   Recent Labs   Lab 11/01/22  1216 11/01/22  0849 11/01/22  0227 10/28/22  1312 10/28/22  0956 10/27/22  2147 10/27/22  2008 10/26/22  0830 10/25/22  2339   WBC  --   --   --   --  6.1  --   --   --  6.5   HGB  --   --   --   --  10.3*  --   --   --  9.5*   MCV  --   --   --   --  99  --   --   --  95   PLT  --   --   --   --  360  --   --   --  320   INR  --   --   --   --  4.48*  --   --   --  2.42*   NA  --   --   --   --  137  --   --   --  139   POTASSIUM  --   --   --   --  2.9*  --   --   --  3.2*   CHLORIDE  --   --   --   --  97*  --   --   --  105   CO2  --   --   --   --  17*  --   --   --  25   BUN  --   --   --   --  11.8  --   --   --  12   CR  --   --   --   --  0.69  --  0.59  --   0.63   ANIONGAP  --   --   --   --  23*  --   --   --  9   LAKESHA  --   --   --   --  9.4  --   --   --  8.4*   * 133* 113*   < > 136*   < >  --    < > 195*   ALBUMIN  --   --   --   --  2.8*  --   --   --  1.9*   PROTTOTAL  --   --   --   --  6.8  --   --   --  7.0   BILITOTAL  --   --   --   --  23.1*  --   --   --  21.0*   ALKPHOS  --   --   --   --  428*  --   --   --  448*   ALT  --   --   --   --  23  --   --   --  25   AST  --   --   --   --  72*  --   --   --   --     < > = values in this interval not displayed.     No results found for this or any previous visit (from the past 24 hour(s)).  Medications       carvedilol  25 mg Oral BID w/meals     cefdinir  300 mg Oral Q12H ZAIRA (08/20)     furosemide  20 mg Oral Daily     insulin aspart  1-3 Units Subcutaneous TID AC     insulin aspart  1-3 Units Subcutaneous At Bedtime     lidocaine  1 patch Transdermal Q24H     lidocaine   Transdermal Q8H ZAIRA     sodium chloride (PF)  3 mL Intracatheter Q8H     sodium chloride (PF)  68 mL Intravenous Once     traZODone  50 mg Oral At Bedtime

## 2022-11-02 NOTE — PLAN OF CARE
Goal Outcome Evaluation:      Plan of Care Reviewed With: patient          Outcome Evaluation: Pt alert, self oriented, VSS on RA, SBA, bed alarm on, occasionally agitated, non verbal pain indicators present , morphine offered.

## 2022-11-02 NOTE — PROGRESS NOTES
[facility] Hospitalist Team  History and Physical       Assessment/Plan:     #N/V, diarrhea  -likely viral gastroenteritis  -cdiff pending, no further diarrhea however    #weakness  -likely due to above  -blood cultures pending  -Viral panel negative  -PT eval OBS goals: 0300-0730AM 11/2/22  Mental status at baseline: met, alert to self  BP stable on oral medications: NOT met; HTN this AM, paged provider.  Abx plan in place: met, on oral abx and taking them daily.  Paracentesis completed: not met; no results    No acute changes overnight.   unspecified whether generalized or localized, unspecified site     bilateral hips, bone spurs left hip   • Other and unspecified hyperlipidemia    • Personal history of other musculoskeletal disorders(V13.59) 1970's    fractured both elbows   • Personal hi polyp.     • COLONOSCOPY  4/17/17= Hemorrhoids, Tortuous    Repeat 2022, needs MAC   • ESOPHAGOGASTRODUODENOSCOPY, POSSIBLE BIOPSY, POSSIBLE POLYPECTOMY 37992 N/A 8/23/2013    Performed by Yajaira Galvan MD at [address] [place] [address] CAD   • Diabetes Sister         Gestational DM   • Other (Other) Sister         MS   • Diabetes Daughter         Type 2 DM on metformin   • Other (Other) Daughter         polycystic ovaries   • Hypertension Son    • Obesity Son         Eric Girard over 300 lbs Data Review:    LABS:   Lab Results   Component Value Date    WBC 7.3 12/27/2019    HGB 12.3 12/27/2019    HCT 38.9 12/27/2019    .0 12/27/2019    CREATSERUM 1.94 12/27/2019    BUN 34 12/27/2019     12/27/2019    K 4.1 12/27/2019     or attenuation. ABDOMEN/PELVIS: LIVER:  Diffuse fatty infiltration of the liver. BILIARY:  Sludge in the gallbladder. No biliary ductal dilatation. PANCREAS:  Homogeneous enhancement. SPLEEN:  Splenomegaly. Spleen measures 15.5 cm. Felicita Spies  KIDNEYS:  Postsurg

## 2022-11-02 NOTE — PROVIDER NOTIFICATION
Provider notified on pt consistent refusal of meds and BG checks, Provider plans to change few meds to liquid.

## 2022-11-02 NOTE — PROGRESS NOTES
OBS goals: 6684-0210  Mental status at baseline: met, alert to self, disoriented to situation, time and place   BP stable on oral medications: NOT met  Abx plan in place: met  Paracentesis completed: not met; Pt refused

## 2022-11-02 NOTE — PROGRESS NOTES
CHW spoke with the pts grandlionel Castelan to set up care conference,she stated that she will be here tomorrow 11/3. We set a time for 11:00 for a CC paged Gold 4 and Palliative Care Pb to confirm attendance with RNCC     Carlos A Bowens   Inpatient Community Health Worker  [facility] 5A & 5B

## 2022-11-02 NOTE — PLAN OF CARE
Goal Outcome Evaluation:      Plan of Care Reviewed With: patient    Overall Patient Progress: no changeOverall Patient Progress: no change    Outcome Evaluation: Pt is confused to time, place, and situation; spoken words are intermittently illogical, at other times she can answer questions and make needs known. Pt is restless and can become agitated; frequently requiring incontinence cares for both GI/. Waiting to see what hospice facility will accept patient/proceed with what to do next.     OBS goals: 9729-8461   Mental status at baseline: met, alert to self  BP stable on oral medications: NOT met; HTN this AM, paged provider.  Abx plan in place: met, on oral abx and taking them daily.  Paracentesis completed: not met; no results

## 2022-11-02 NOTE — PROGRESS NOTES
[facility]    Medicine Progress Note - Hospitalist Service, GOLD TEAM 4    Date of Admission:  10/25/2022    Assessment & Plan   Maria Guadalupe Betancourt is a 68 yo F with PMHx of gallbladder cancer, HCV cirrhosis, IDDM2, Hx of CVA, anxiety, cognitive impairment, and schizophrenia, recently initiated on hospice, brought into the ED for LE edema and jaundice admitted on 10/25/2022 with family changes in GOC to pursue goal directed therapy.     # GOC  - Please see GOC discussion and care conference documentation on 10/28 for details. Patient is not decisional, and grand-daughter Flip assisting with medical decision making. Family is interested in some restorative cares, but aware of terminal state of cancer, and not interested in chemo/radiation, or invasive procedures.    - Appreciate palliative care assistance, family considering hospice and further GOC.   - Care conference planned for 11/3 at 11 AM with primary team, palliative care and family.   - DNR/DNI, no ICU  - continue current treatments, with focus on palliative and comfort (abx, edema control, treatment of anxiety and pain)   - Patient refusing labs. Discussed with family, okay with no blood draws    - Appreciate SW assistance with placement, needs memory care unit with 24 hours care or home with family who will provide 24/7 care   - Patient refusing pills, will switch to liquid formulations.     # Worsening jaundice and LE edema  # Gallbladder cancer   # Intrahepatic and extrahepatic biliary duct dilation   # HCV Cirrhosis  Patient dx with T3 gallbladder cancer after laparoscopic cholecystectomy for acute cholecystitis 4/2022. Patient previously evaluated by hepatobiliary and pancreatic surgery and oncology and no surgical or chemotherapy treatments were offered due to multiple co-morbidities and placed on hospice two months ago.  Patient brought in by family with worsening jaundice and LE edema and revoked  hospice care. Elevated alk phos 400s, and T bili 21 concerning for progression of disease with worsening liver failure with MELD 28 and INR 2.42 Abdominal US with CBD dilation 17 mm concerning for biliary obstruction from progressive cancer. Evaluated by GI who recommended CT abd/pelvis with intrahepatic biliary dilation suspicious for obstructing tumors and possible diverticulitis. GI considered palliative stenting but family not interested invasive procedures at this time. Oncology consulted would not offer any cancer directed therapy at this time. Attempted to get paracentesis to evaluate for SBP, but patient refused.   - Appreciate GI, oncology and IR consult  - Change Lasix 20 mg daily PRN for edema with poor PO intake and resolution of edema.  - Pain management: Roxanol 5 mg Q4H PRN, liquid tylenol 650 mg Q6H PRN and gabapentin 100 mg QID PRN    # UTI - UA with + nitrites, LE and bacteria. Patient unable to tell if she has symptoms. UCx + for E. Coli.   - Continue Cefdinir 300 mg BID x3 days, switched to liquid      # Dementia - Patient previously residing in memory care unit with prior SLUMS score reportedly <10/30. OT consulted and repeat SLUM this admission 1/30. Patient does not have capacity to make her own decision. Family assisting with medical decision making. Patient requires 24/7 care.     # Anxiety, Schizophrenia - Patient previously on Ativan 0.5 mg Q4H PRN, olanzapein 10 mg BID, mirtazapine 15 mg at bedtime, Seroquel 100 mg at bedtime, and trazodone 50 mg at bedtime, but stopped when transitioned to hospice.   - Continue ativan liquid 0.5 mg Q4H PRN anxiety, and trazodone 50 mg at bedtime     # HTN - Continue PTA coreg 25 mg BID and PO hydralazine 10 mg Q4H PRN for SBP >180 and DBP >110     # DM2 - previously on insulin (Lantus 35 Units daily with Novolog sliding scale insulin), but stopped when transitioned to hospice. Patient not eating and refusing meals, so stop Lantus and Novolog carb  "coverage. Blood sugars well controlled requiring minimal correctional sliding scale  - Decrease BG checks to BID  - Stop sliding scale insulin   - Hypoglycemia protocol        Diet: Regular Diet Adult    DVT Prophylaxis: Pneumatic Compression Devices  Hunter Catheter: Not present  Central Lines: None  Cardiac Monitoring: None  Code Status: No CPR- Do NOT Intubate      Disposition Plan      Expected Discharge Date: 11/05/2022    Discharge Delays: Healthcare Proxy/Guardian making decision  Social/Family Delay    Discharge Comments: 11/2: grandzuleykagter will likely take patient home. will need DME equipment ordered.        The patient's care was discussed with the Attending Physician, Dr. Elvi Gutiérrez, Bedside Nurse and Patient.    Yani Callahan PA-C  Hospitalist Service, GOLD TEAM [address] [place] [address]  Securely message with the Vocera Web Console (learn more here)  Text page via [facility] Paging/Directory   Please see signed in provider for up to date coverage information      Clinically Significant Risk Factors              # Hypoalbuminemia: Lowest albumin = 1.9 g/dL (Ref range: 3.5-5.2) at 10/25/2022 11:39 PM, will monitor as appropriate           # Moderate Malnutrition: based on nutrition assessment, PRESENT ON ADMISSION       ______________________________________________________________________    Interval History   Patient denies any pain, lying comfortably in bed. Unable to obtain further hx, as patient just yells \"No!\" when asked questions.    Data reviewed today: I reviewed all medications, new labs and imaging results over the last 24 hours.     Physical Exam   Vital Signs: Temp: 98.7  F (37.1  C) Temp src: Oral BP: (!) 166/69 Pulse: 63   Resp: 16 SpO2: 98 % O2 Device: None (Room air)    Weight: 113 lbs 12.8 oz  GENERAL: Sleepy but awakes to voice and light touch. Lying in bed comfortably.  HEENT: AT/NC. Icteric sclera.  CARDIOVASCULAR: RRR. S1, S2. No murmurs, rubs, " or gallops.   RESPIRATORY: Effort normal on RA. Clear to auscultation bilaterally, no rales, rhonchi or wheezes, respirations unlabored   GI: Abdomen soft, non-tender abdomen without rebound or guarding, normoactive bowel sounds present. Mass palpated in RUQ.   EXTREMITIES: No peripheral edema. No calf asymmetry, erythema, or tenderness.   NEUROLOGICAL: CN II-XII grossly intact. Moving all extremities symmetrically.   SKIN: Intact. Warm and dry. + Jaundice    Data   Recent Labs   Lab 11/02/22  1058 11/01/22  2214 11/01/22  1736 10/28/22  1312 10/28/22  0956 10/27/22  2147 10/27/22  2008   WBC  --   --   --   --  6.1  --   --    HGB  --   --   --   --  10.3*  --   --    MCV  --   --   --   --  99  --   --    PLT  --   --   --   --  360  --   --    INR  --   --   --   --  4.48*  --   --    NA  --   --   --   --  137  --   --    POTASSIUM  --   --   --   --  2.9*  --   --    CHLORIDE  --   --   --   --  97*  --   --    CO2  --   --   --   --  17*  --   --    BUN  --   --   --   --  11.8  --   --    CR  --   --   --   --  0.69  --  0.59   ANIONGAP  --   --   --   --  23*  --   --    LAKESHA  --   --   --   --  9.4  --   --    * 148* 153*   < > 136*   < >  --    ALBUMIN  --   --   --   --  2.8*  --   --    PROTTOTAL  --   --   --   --  6.8  --   --    BILITOTAL  --   --   --   --  23.1*  --   --    ALKPHOS  --   --   --   --  428*  --   --    ALT  --   --   --   --  23  --   --    AST  --   --   --   --  72*  --   --     < > = values in this interval not displayed.     No results found for this or any previous visit (from the past 24 hour(s)).  Medications       carvedilol  25 mg Oral BID w/meals     cefdinir  300 mg Oral BID     lidocaine  1 patch Transdermal Q24H     lidocaine   Transdermal Q8H ZAIRA     sodium chloride (PF)  3 mL Intracatheter Q8H     sodium chloride (PF)  68 mL Intravenous Once     traZODone  50 mg Oral At Bedtime

## 2022-11-02 NOTE — PLAN OF CARE
Goal Outcome Evaluation:  Observation goals  EFFECTIVE NOW        Comments: Mental status at baseline: Met, pt only oriented to self  BP stable on oral medications: BP stable, but pt intermittently refusing meds  Abx plan in place: Pt on Oral abx  Paracentesis completed: Not met, awaiting for plan

## 2022-11-02 NOTE — PLAN OF CARE
Goal Outcome Evaluation:      Plan of Care Reviewed With: patient          Outcome Evaluation: Pt. oriented to self, asnwering questions intermittently and sometimes uncooperative.  Pt Occasionally agitated and yellls out.  Nursing in her room frequently due to yelling after having BM.  Pt  having frequent stools and need for urination.  Reported pain, but declining usage of norco.  Warm packs and temperature pump utilized instead. Also declined Trazodone at bedtime. Occasionally answering yes or no questions, but quickly changes her answers no after saying yes. Pt unable to tell staff what she wants. Bed alarm currently on for safety.

## 2022-11-02 NOTE — PLAN OF CARE
Goal Outcome Evaluation:    Goal Outcome Evaluation:  Observation goals  EFFECTIVE NOW        Comments: Mental status at baseline: Met, pt only oriented to self  BP stable on oral medications: BP stable, but pt intermittently refusing meds  Abx plan in place: Pt on Oral abx  Paracentesis completed: Not met, awaiting for plan

## 2022-11-02 NOTE — PROGRESS NOTES
OBS goals: [id number]  Mental status at baseline: met, alert to self, disoriented to situation, time and place   BP stable on oral medications: NOT met  Abx plan in place: met, occasionally refusing   Paracentesis completed: not met; not completed, Pt refused

## 2022-11-03 NOTE — PROGRESS NOTES
CLINICAL NUTRITION SERVICES - BRIEF NOTE      A full Nutrition re-assessment will be deferred at this time as patient is currently observation status.      Pt can be referred to outpatient RD by primary care provider after discharge as appropriate.      Should patient's status change to Inpatient, we will be available for a full Nutrition Assessment with a consult.     Vale Telles, MS/RD/LD  5A (212-)/5B RD pager: [phone]  Weekend/Holiday RD pager: [phone]

## 2022-11-03 NOTE — PLAN OF CARE
Goal Outcome Evaluation:      Plan of Care Reviewed With: patient          Outcome Evaluation: Confused to place, time and situation, oriented to self, refusing most cares, started on phenazopyridine for c/o painful urination/virginal itching, watch for discolored urine, UA/UC need to be collected.

## 2022-11-03 NOTE — PROGRESS NOTES
OBS goals: 2663-7427 11/3    Mental status at baseline: met, only alert to self  BP stable on oral medications: NOT met, refused BP meds  Abx plan in place: not met, refused to take abx on evening shift  Paracentesis completed: not met; Pt refused

## 2022-11-03 NOTE — PROGRESS NOTES
OBS goals: 3905-4980 11/3     Mental status at baseline: met, only alert to self  BP stable on oral medications: NOT met, refused BP meds  Abx plan in place: not met, refuse refusing meds Paracentesis completed: not met; Pt refused

## 2022-11-03 NOTE — CONSULTS
Consult Note - [facility]     ______________________________________________________________________     [facility] Contact Number: [phone]    - Providers: Please contact [facility] with changes in orders or clinical plan of care   - Nursing: Please contact [facility] with significant changes in patient condition     Hospice will notify the care team (including the hospitalist) to confirm date of inpatient hospice (GIP) admission.    New Epic encounter will not be created until hospice completes admission.   ______________________________________________________________________          [facility] would like to thank you for the GIP -IP referral.       Referral is being verified for insurance coverage by [facility] hospice intake.      Review of eligibility with intake team and medical director is currently underway.         Maria Guadalupe Cheema RN  [phone]     Maria Guadalupe Cheema RN

## 2022-11-03 NOTE — PROGRESS NOTES
Care Management Follow Up    Length of Stay (days): 1    Expected Discharge Date: TBD     Concerns to be Addressed: Discharge Planning, granddaughter agreeable to GIP consult anf referral to [facility] (sent)    Patient/Family in Agreement with the Plan: granddaughter agreeable to GIP consult anf referral to [facility] (sent)    Referrals Placed by CM:  [facility] ([facility])    Patient plan of care discussed with Gold 4 Team: Yes    Anticipated Discharge Disposition:  GIP vs OLOP, vs family members home    Anticipated Discharge Services:  TBD  Community Services still involved in patients care:  [facility] Care Coordinator (through [employer]) Екатерина Berg  [facility]  Cathryn Perez (ph: [phone])      [facility] has discharged patient due to lack of contact and participation by/from family. Initially signed onto hospice 9/2022. Patient was receiving sublingual dilaudid and ativan, for agitation and pain, prescribed by Hospice. [facility] does not have a prescriber for these medications unless patient is sign on with hospice.    Anticipated Discharge DME:  TBD  Maria Guadalupe had a hospital bed through hospice and they are picking it up today 10/28. She ll need a hospital bed (ordered through insurance) for [facility] at discharge.    Education Provided on the Discharge Plan on Friday 10/28:  Granddaughter asked about finding placement closer ([facility] is 45 minutes away for them). Writer explained that without patient signed onto hospice she falls into a placement category of long term care (LTC), patients dementia diagnosis would require a locked memory care unit. Finding a LTC facility with an open bed in a memory care unit is extremely difficult and time consuming (typically taking months). Writer explained to granddaughter that it is not appropriate use of hospital resources for patient to remain here, when patient has a safe discharge plan with returning to [facility] (has bedhold, started  10/22, ends 11/8).     Private pay costs discussed: Not applicable, [facility]      Leelee Ritter RN, BSN, PHN  Nurse Care Coordinator  Unit 5A, Rooms 5211-1 to 5219  Unit 5C, Rooms 3783-0169  [facility]   Desk phone & confidential voicemail: [phone]  Mon-Fri Pager: [phone]    To contact the On-call Weekend RNCC  [place] (0800 - 1630) Saturday and Sunday    Units: 4A, 4C, 4E, 5A and 5B - Pager 1: [phone]    Units: 6A, 6B, 6C, and 6D - Pager 2: [phone]    Units: 7A, 7B, 7C, 7D, and 5C- Pager 3: [phone]    [facility] [place] ([phone]) Saturday and Sunday    Units: 5 Ortho, 8A, 10 ICU, & Pediatric Units-Pager 4: [phone]          Granddaughter Jamal - MOE,   alterative agent is Cousin Roselyn

## 2022-11-03 NOTE — PROGRESS NOTES
OBS goals: 9188-9323 11/3     Mental status at baseline: met, only alert to self  BP stable on oral medications: NOT met, refused meds   Abx plan in place: not met, refusing meds   Paracentesis completed: not met; Pt refused

## 2022-11-03 NOTE — CONSULTS
[facility]    Consult Note - [facility]    ______________________________________________________________________    [facility] 24/7 Contact Number: [phone]    ______________________________________________________________________        Maria Guadalupe is currently ineligible for inpatient hospice.    Rationale: No active symptoms such as dyspnea, terminal secretions, unmanaged pain, terminal agitation. Unable to contact family today. However, family is interested in hospice. Will re evaluate patient tomorrow to determine if she qualifies.    Ineligibility Status Discussed with the Following:   - Nurse: Rhea Greco RN (Tel. [phone])  - Hospitalist/Rounding Provider:   Yani Callahan PA-C    Hospitalist    Since 11/3/2022    [phone]         - Maria Guadalupe's Family/Preferred Contact: ZachKentFlip (Grandchild) [phone] (Mobile  - Hospice Provider: Dr. Usama Castro  - Hospice Social Worker: Kerri Olivarez  Is Maria Guadalupe eligible for hospice on Discharge? yes    Maria Guadalupe Cheema, STEPHANIE

## 2022-11-03 NOTE — PLAN OF CARE
"Goal Outcome Evaluation:      Plan of Care Reviewed With: patient    Overall Patient Progress: no change   OBS goals: 1042-2724 11/3  Mental status at baseline: met, only alert to self  BP stable on oral medications: NOT met, refused BP meds and vital signs in AM  Abx plan in place: not met, refused to take abx on evening shift  Paracentesis completed: not met; Pt refused      Outcome Evaluation: Pt confused to place and situation, oriented to herself. Pt is intermittently irritable, uncooperative, but at other times can be redirected. Pt is incontinent of GI/, having two BMs overnight. Did not eat any of her dinner, encouaring fluids but did not drink anything overnight. Applied lotion to back, arms and chest for dry and itchy skin. Using heating pad for back; pt exclaims \"help me!\" but cannot answer questions and becomes agitated. She denies pain most times, but appears uncomfortable at times, it's hard to assess pt with waxing and waning behaviors. Waiting for plan of care going forward, family and SW involved.      "

## 2022-11-03 NOTE — PROGRESS NOTES
[facility]    Medicine Progress Note - Hospitalist Service, GOLD TEAM 4    Date of Admission:  10/25/2022    Assessment & Plan   Maria Guadalupe Betancourt is a 68 yo F with PMHx of gallbladder cancer, HCV cirrhosis, IDDM2, Hx of CVA, anxiety, cognitive impairment, and schizophrenia, recently initiated on hospice, brought into the ED for LE edema and jaundice admitted on 10/25/2022 with family changes in GOC to pursue goal directed therapy. After multiple discussions, family now agreeable to focus on comfort and re-pursue hospice options close to home.     # GOC  - Please see GOC discussion and care conference documentation on 10/28 for details. Patient is not decisional, and grand-daughter Flip assisting with medical decision making. Family is aware of terminal state of cancer, and not interested in chemo/radiation, or invasive procedures.  Initially interested in some restorative cares, but after further discussion, family would like to re-pursue hospice.   - Appreciate palliative care assistance  - DNR/DNI, no ICU  - After discussion with family, okay to stop blood pressure medications, stop blood sugar checks, will focus of providing comfort as priority. No blood draws per prior discussion.Family is still okay with treating infections if it provides some comfort.  - Appreciate SW assistance with placement, either hospice facility or home with hospice. Will place GIP consult if patient     # Worsening jaundice and LE edema  # Gallbladder cancer   # Intrahepatic and extrahepatic biliary duct dilation   # HCV Cirrhosis  Patient dx with T3 gallbladder cancer after laparoscopic cholecystectomy for acute cholecystitis 4/2022. Patient previously evaluated by hepatobiliary and pancreatic surgery and oncology and no surgical or chemotherapy treatments were offered due to multiple co-morbidities and placed on hospice two months ago.  Patient brought in by family with worsening  jaundice and LE edema and revoked hospice care. Elevated alk phos 400s, and T bili 21 concerning for progression of disease with worsening liver failure with MELD 28 and INR 2.42 Abdominal US with CBD dilation 17 mm concerning for biliary obstruction from progressive cancer. Evaluated by GI who recommended CT abd/pelvis with intrahepatic biliary dilation suspicious for obstructing tumors and possible diverticulitis. GI considered palliative stenting but family not interested invasive procedures at this time. Oncology consulted would not offer any cancer directed therapy at this time. Attempted to get paracentesis to evaluate for SBP, but patient refused.   - Appreciate GI, oncology and IR consult  - Lasix 20 mg daily PRN for edema   - Pain management: Roxanol 5 mg TID (as patient does not seem to ask for pain meds or able to communicate at times that she is in pain) and 5-10 mg Q4H PRN    # UTI - UA with + nitrites, LE and bacteria. Patient unable to tell if she has symptoms. UCx + for E. Coli. Patient completed 5 days of antibiotics (Ceftriaxone --> Keflex --> cefdinir). Patient has refused last 2 days of abx. Now reporting dysuria again to nursing staff and vaginal itching. Possibly UTI vs vaginal candidiasis. Family wants to treat infections if helps   - Repeat clean catch UA if able  - Miconazole vaginal suppository and cream  - Pyridium 100 mg BID PRN      # Dementia - Patient previously residing in memory care unit with prior SLUMS score reportedly <10/30. OT consulted and repeat SLUM this admission 1/30. Patient does not have capacity to make her own decision. Family assisting with medical decision making. Patient requires 24/7 care.     # Anxiety, Schizophrenia - Patient previously on Ativan 0.5 mg Q4H PRN, olanzapein 10 mg BID, mirtazapine 15 mg at bedtime, Seroquel 100 mg at bedtime, and trazodone 50 mg at bedtime, but stopped when transitioned to hospice.   - Continue Ativan liquid 0.5 mg Q4H PRN and  trazodone 50 mg at bedtime     # HTN - Stop coreg after discussion with family as not providing comfort, patient is refusing, and blood pressure is only moderately elevated     # DM2 - previously on insulin (Lantus 35 Units daily with Novolog sliding scale insulin), but stopped when transitioned to hospice. Patient not eating and refusing meals, so stop Lantus and Novolog carb coverage.   - After discussion with family will stop blood sugar checks      Diet: Regular Diet Adult    DVT Prophylaxis: No indicated as focusing on comfort   Hunter Catheter: Not present  Central Lines: None  Cardiac Monitoring: None  Code Status: No CPR- Do NOT Intubate      Disposition Plan      11/4 or 11/5   The patient's care was discussed with the Attending Physician, Dr. Elvi Gutiérrez, Bedside Nurse, Patient, Patient's Family and Palliative care Consultant.    Yani Callahan PA-C  Hospitalist Service, GOLD TEAM [address] [place] [address]  Securely message with the Vocera Web Console (learn more here)  Text page via [facility] Paging/Directory   Please see signed in provider for up to date coverage information      Clinically Significant Risk Factors              # Hypoalbuminemia: Lowest albumin = 1.9 g/dL (Ref range: 3.5-5.2) at 10/25/2022 11:39 PM, will monitor as appropriate           # Moderate Malnutrition: based on nutrition assessment, PRESENT ON ADMISSION       ______________________________________________________________________    Interval History   Unable to obtain significant history from patient. Patient reports no when asked if she has any pain. Does not answer further questions appropriately. Per nursing aid, patient did report burning with urination when taken to the commode.  Per nursing, patient has been refusing most food and medications.      Data reviewed today: I reviewed all medications, new labs and imaging results over the last 24 hours.    Physical Exam   Vital Signs: Temp: 97.9   F (36.6  C) Temp src: Axillary BP: (!) 155/63 Pulse: 62   Resp: 18 SpO2: 96 % O2 Device: None (Room air)    Weight: 113 lbs 12.8 oz  GENERAL: Sleepy, but awakes to voice. Lying comfortably in bed.  NAD.  HEENT: AT/NC. Icteric sclera.   CARDIOVASCULAR: RRR. S1, S2. No murmurs, rubs, or gallops.   RESPIRATORY: Effort normal on RA. Clear to auscultation bilaterally, no rales, rhonchi or wheezes, respirations unlabored   GI: Abdomen soft, TTP in RUQ abdomen without rebound or guarding, normoactive bowel sounds present  EXTREMITIES: No peripheral edema.   NEUROLOGICAL:  CN II-XII grossly intact. Moving all extremities symmetrically.   SKIN: Intact. Warm and dry.  + Jaundice     Data   Recent Labs   Lab 11/03/22  0959 11/03/22  0910 11/02/22  2240 10/28/22  1312 10/28/22  0956 10/27/22  2147 10/27/22  2008   WBC  --   --   --   --  6.1  --   --    HGB  --   --   --   --  10.3*  --   --    MCV  --   --   --   --  99  --   --    PLT  --   --   --   --  360  --   --    INR  --   --   --   --  4.48*  --   --    NA  --   --   --   --  137  --   --    POTASSIUM  --   --   --   --  2.9*  --   --    CHLORIDE  --   --   --   --  97*  --   --    CO2  --   --   --   --  17*  --   --    BUN  --   --   --   --  11.8  --   --    CR  --   --   --   --  0.69  --  0.59   ANIONGAP  --   --   --   --  23*  --   --    LAKESHA  --   --   --   --  9.4  --   --    * 142* 147*   < > 136*   < >  --    ALBUMIN  --   --   --   --  2.8*  --   --    PROTTOTAL  --   --   --   --  6.8  --   --    BILITOTAL  --   --   --   --  23.1*  --   --    ALKPHOS  --   --   --   --  428*  --   --    ALT  --   --   --   --  23  --   --    AST  --   --   --   --  72*  --   --     < > = values in this interval not displayed.     No results found for this or any previous visit (from the past 24 hour(s)).  Medications       cefdinir  300 mg Oral BID     lidocaine  1 patch Transdermal Q24H     lidocaine   Transdermal Q8H ZAIRA     morphine sulfate  5 mg Oral TID      sodium chloride (PF)  3 mL Intracatheter Q8H     sodium chloride (PF)  68 mL Intravenous Once     traZODone  50 mg Oral At Bedtime

## 2022-11-03 NOTE — PLAN OF CARE
Goal Outcome Evaluation:      Plan of Care Reviewed With: patient    Overall Patient Progress: no changeOverall Patient Progress: no change     Pt alert, oriented to self. VSS on RA. Refusing medications this shift after multiple attempts. Bed alarm in place. Regular diet, not much appetite. Up with A1 and walker in room and to bathroom. BG this shift 144 and 147. No PIV. Continue with POC.    Observation Goals:  Mental status at baseline: met, alert to self  BP stable on oral medications: not met, Pt refused  Abx plan in place: met  Paracentesis completed: not met; Pt refused

## 2022-11-03 NOTE — PROGRESS NOTES
[facility]  Palliative Care Daily Progress Note       Recommendations & Counseling       DNR/DNI, pursuing hospice at discharge either to facility close to family or home    Would schedule Roxanol TID for comfort in addition to PRN doses    Would utilize PRN lorazepam and olanzapine for agitation/anxiety symptoms    Would consider de-escalating hospital measures that are uncomfortable and agitating to patient as much as possible    Appreciate RNCC assistance with disposition planning    Will need an updated POLST          Assessments          Maria Guadalupe Betancourt is a 67 year old female who presented with LE swelling in the setting of cirrhosis and known gallbladder cancer, not on treatment. Patient was on hospice prior to presentation to the ED.      Today, the patient was seen for:  Gallbladder cancer  Cirrhosis  AMS  UTI    Prognosis, Goals, or Advance Care Planning was addressed today with: Yes.    Mood, coping, and/or meaning in the context of serious illness were addressed today: Yes.  Summary/Comments: discussed with daughter via phone today goals of care, she is aware that time is shorter now then we thought previously. Reviewed their hopes to get her close to family so they can visit frequently. Discussed plans should she decline to taker her back to the hospital, and daughter does not think that would be helpful anymore given the lack of intervention patient has been accepting of while in the hospital this time. Discussed having a hospice team support her and family when she discharges as well as finding a place if possible close to them versus taking her home. I do worry about going home and the family not being able to sustain care giving should she live for a longer period of time then expected.              Interval History:     Chart review/discussion with unit or clinical team members:   Notes reviewed, no acute events, having anxiety and agitation.    Per  patient or family/caregivers today:  Patient is awake, does not communicate verbally but nods head. Seems comfortable. Is having burning pain with urination. Also intermittently agitated and mostly with cares she needs.    Key Palliative Symptoms:                 Review of Systems:     Besides above, ROS was reviewed and is unremarkable          Medications:     I have reviewed this patient's medication profile and medications during this hospitalization.    Noted meds:    Lidocaine patch  Trazodone at bedtime  Morphine PRN             Physical Exam:   Vitals were reviewed  Temp: 97.9  F (36.6  C) Temp src: Axillary BP: (!) 155/63 Pulse: 62   Resp: 18 SpO2: 96 % O2 Device: None (Room air)    No intake or output data in the 24 hours ending 11/03/22 0933    Constitutional: resting comfortably, chronically ill appearing, jaundiced, no apparent distress  ENT: Normocephalic, without obvious abnormality, atramatic  Lungs: No increased work of breathing, good air exchange  Musculoskeletal: No redness, warmth, or swelling of the joints.   Neurologic: patient sleeping, deferred  Skin: No rashes, erythema, jaundiced             Data Reviewed:     Reviewed recent pertinent imaging, comments:   CT abd/pelvis 10/28  IMPRESSION:   1. Prominent intrahepatic biliary dilatation leading up to the   confluence of the central and left bile ducts and to a lesser degree   the right bile duct which is suspicious for obstructing tumor such as   the patient's known history of locally advanced gallbladder carcinoma.   This appears to be new from CT report 7/25/2022. Images not available   for direct comparison. If the patient is agreeable to an intravenous   contrast study, recommend follow-up CT or MRI/MRCP study.   2. Mild perihepatic and low pelvic ascites.   3. Concern for cranial rotation of the left total hip arthroplasty   acetabular cup, comparison with outside exams if they can be obtained   would be helpful to determine chronicity    4. Small amount of fat stranding about the sigmoid colon with possible   wall thickening which may be related to generalized mesenteric edema   or superimposed uncomplicated diverticulitis. Correlation with any GI   symptoms are focal tenderness in this region.   5. Limited evaluation for metastatic disease on noncontrast exam which   can be reevaluated on any follow-up contrast study.     I have personally reviewed the examination and initial interpretation   and I agree with the findings.     Reviewed recent labs, comments:   Glucose 142      SYLVIA Garcia CNS  Palliative Care Consult Team  Pager: [phone]    35 minutes spent. This includes 20 minutes face to face with patient and daughter discussing current complex health conditions and prognosis, goals of care, symptom management. Coordination of care with the primary team and RNCC regarding goals of care and symptom management.

## 2022-11-04 NOTE — PLAN OF CARE
Goal Outcome Evaluation:      Plan of Care Reviewed With: patient    Overall Patient Progress: no changeOverall Patient Progress: no change    Outcome Evaluation: Pt only oriented to self.  Pt more lethargic towards the evening, and not eating/drinking.  Pt had NO urine output or BM this shift. Team notified. Refusing most cares, including oral medications.  However, pt agreeable to creams and topical items.  Attempted to give pt sublingual morphine and ativan today without sucess as pt combative and yells if SL syringe brought near her.  Palliative team notified, and IM as well as topical pain management options will be considered.  Pt declined to eat today.  1 Episode of loose BM today.  Pt also refused to have gown on and appears comfortable without clothes on currently.  Pt resting comfortably after recieving Sarna lotion for itching. Currently awaiting placement w/ outpatient hospice.

## 2022-11-04 NOTE — CONSULTS
Discharge Pharmacy Test Claim    Buprenorphine patches are considered non-formulary per patient's [facility] prepaid medical assistance plan. Please contact pharmacy liaison to initiate prior authorization.    Addendum 1:14 pm: prior authorization for buprenorphine 5mcg patches approved with $1 copay.    Екатерина Hernández  [facility] Liaison  Ph: [phone] Pager: [phone]

## 2022-11-04 NOTE — PLAN OF CARE
Observation Goals:  Mental status at baseline: met, alert and oriented to self.  BP stable on oral medications: not met, Pt refusing medications.  Abx plan in place: not met, Pt refusing medications.  Paracentesis completed: not met; Pt refused.

## 2022-11-04 NOTE — PROGRESS NOTES
Care Management Follow Up    Length of Stay (days): 1    Expected Discharge Date: TBD     Concerns to be Addressed: Discharge Planning,     Patient/Family in Agreement with the Plan: granddaughter agreeable to [facility] consult anf referral to [facility] (sent)    Referrals Placed by CM:    [facility] ([facility]) is reviewing - faxed updated physician progress notes stating family wants/agreeable hospice, at 1420 today. They will call writer when review is complete.    [facility] has declined - please see their note for details (11/3 Maria Guadalupe Cheema RN)    Patient plan of care discussed with Gold 4 Team: Yes    Anticipated Discharge Disposition:  [facility] vs [facility], vs family members home    Anticipated Discharge Services:  TBD  Community Services still involved in patients care:  [facility] Care Coordinator (through [employer]) Екатерина Berg  [facility]  Cathryn Perez (ph: [phone])      [facility] has discharged patient due to lack of contact and participation by/from family. Initially signed onto hospice 9/2022. Patient was receiving sublingual dilaudid and ativan, for agitation and pain, prescribed by Hospice. [facility] does not have a prescriber for these medications unless patient is sign on with hospice.    Anticipated Discharge DME:  TBD  Maria Guadalupe had a hospital bed through hospice and they are picking it up today 10/28. She ll need a hospital bed (ordered through insurance) for [facility] at discharge.    Education Provided on the Discharge Plan on Friday 10/28:  Granddaughter asked about finding placement closer ([facility] is 45 minutes away for them). Writer explained that without patient signed onto hospice she falls into a placement category of long term care (LTC), patients dementia diagnosis would require a locked memory care unit. Finding a LTC facility with an open bed in a memory care unit is extremely difficult and time consuming (typically taking months). Writer explained to granddaughter that  it is not appropriate use of hospital resources for patient to remain here, when patient has a safe discharge plan with returning to [facility] (has bedhold, started 10/22, ends 11/8).     Private pay costs discussed: Not applicable, Leon Ritter RN, BSN, PHN  Nurse Care Coordinator  Unit 5A, Rooms 5211-1 to 5219  Unit 5C, Rooms 1644-6287  [facility]   Desk phone & confidential voicemail: [phone]  Mon-Fri Pager: [phone]    To contact the On-call Weekend RNCC  [place] (0800 - 1630) Saturday and Sunday    Units: 4A, 4C, 4E, 5A and 5B - Pager 1: [phone]    Units: 6A, 6B, 6C, and 6D - Pager 2: [phone]    Units: 7A, 7B, 7C, 7D, and 5C- Pager 3: [phone]    [facility] ([phone]) Saturday and Sunday    Units: 5 Ortho, 8A, 10 ICU, & Pediatric Units-Pager 4: [phone]          Granddaughter Jamal - [facility],   alterative agent is Cousin Roselyn

## 2022-11-04 NOTE — PROGRESS NOTES
[facility]    Medicine Progress Note - Hospitalist Service, GOLD TEAM 4    Date of Admission:  10/25/2022    Assessment & Plan   Maria Guadalupe Betancourt is a 66 yo F with PMHx of gallbladder cancer, HCV cirrhosis, IDDM2, Hx of CVA, anxiety, cognitive impairment, and schizophrenia, recently initiated on hospice, brought into the ED for LE edema and jaundice admitted on 10/25/2022 with family changes in GOC to pursue goal directed therapy. After multiple discussions, family now agreeable to focus on comfort and re-pursue hospice options close to home.     # GOC  - Please see GOC discussion and care conference documentation on 10/28 for details. Patient is not decisional, and grand-daughter Flip assisting with medical decision making. Family is aware of terminal state of cancer, and not interested in chemo/radiation, or invasive procedures.  Initially interested in some restorative cares, but after further discussion, family would like to re-pursue hospice. After discussion with family, okay to stop blood pressure medications, stop blood sugar checks, and no blood draws. Family is still okay with treating infections if it provides some comfort.   - Appreciate palliative care assistance  - DNR/DNI, no ICU  - Butrans 5 mcg/hr, Roxanol 5-10 mg Q4H PRN pain, ativan 0.5 mg Q4H PRN  - Haldol 2 mg IV/IM q6H for agitation if unable to take PO   - Appreciate SW assistance with placement, either hospice facility or home with hospice. Not accepted to [facility] currently, but if worsens will have them reassess      # Worsening jaundice and LE edema  # Gallbladder cancer   # Intrahepatic and extrahepatic biliary duct dilation   # HCV Cirrhosis  Patient dx with T3 gallbladder cancer after laparoscopic cholecystectomy for acute cholecystitis 4/2022. Patient previously evaluated by hepatobiliary and pancreatic surgery and oncology and no surgical or chemotherapy treatments were offered due to  multiple co-morbidities and placed on hospice two months ago.  Patient brought in by family with worsening jaundice and LE edema and revoked hospice care. Elevated alk phos 400s, and T bili 21 concerning for progression of disease with worsening liver failure with MELD 28 and INR 2.42 Abdominal US with CBD dilation 17 mm concerning for biliary obstruction from progressive cancer. Evaluated by GI who recommended CT abd/pelvis with intrahepatic biliary dilation suspicious for obstructing tumors and possible diverticulitis. GI considered palliative stenting but family not interested invasive procedures at this time. Oncology consulted would not offer any cancer directed therapy at this time. Attempted to get paracentesis to evaluate for SBP, but patient refused.   - Appreciate GI, oncology and IR consult  - Lasix 20 mg daily PRN for edema   - Atarax PRN itching, Sarna cream     # UTI - UA with + nitrites, LE and bacteria. Patient unable to tell if she has symptoms. UCx + for E. Coli. Patient completed 5 days of antibiotics (Ceftriaxone --> Keflex --> cefdinir). Patient has refused last 2 days of abx. Now reporting dysuria again to nursing staff and vaginal itching. Possibly UTI vs vaginal candidiasis. Family wants to treat infections if helps   - Repeat UA appears possibly infected but with multiple squams, so suspect not a clean catch. Will follow up culture   - Miconazole vaginal suppository and cream  - Pyridium 100 mg BID PRN      # Dementia - Patient previously residing in memory care unit with prior SLUMS score reportedly <10/30. OT consulted and repeat SLUM this admission 1/30. Patient does not have capacity to make her own decision. Family assisting with medical decision making. Patient requires 24/7 care.     # Anxiety, Schizophrenia - Patient previously on Ativan 0.5 mg Q4H PRN, olanzapein 10 mg BID, mirtazapine 15 mg at bedtime, Seroquel 100 mg at bedtime, and trazodone 50 mg at bedtime, but stopped when  transitioned to hospice.   - Continue Ativan liquid 0.5 mg Q4H PRN and trazodone 50 mg at bedtime PRN    # HTN - Stop coreg after discussion with family as not providing comfort, patient is refusing, and blood pressure is only moderately elevated     # DM2 - previously on insulin (Lantus 35 Units daily with Novolog sliding scale insulin), but stopped when transitioned to hospice. Patient not eating and refusing meals, so stop Lantus and Novolog carb coverage.   - After discussion with family will stop blood sugar checks        Diet: Regular Diet Adult    DVT Prophylaxis: Hospice, comfort focused   Hunter Catheter: Not present  Central Lines: None  Cardiac Monitoring: None  Code Status: No CPR- Do NOT Intubate      Disposition Plan      Medications       buprenorphine  1 patch Transdermal Weekly     buprenorphine   Transdermal Q8H     miconazole   Topical BID     miconazole  200 mg Vaginal At Bedtime     sodium chloride (PF)  3 mL Intracatheter Q8H     sodium chloride (PF)  68 mL Intravenous Once        The patient's care was discussed with the Attending Physician, Dr. Elvi Gutiérrez, Bedside Nurse, Patient and Palliative care Consultant.    Yani Callahan PA-C  Hospitalist Service, [address]  Securely message with the Vocera Web Console (learn more here)  Text page via [facility] Paging/Directory   Please see signed in provider for up to date coverage information      Clinically Significant Risk Factors              # Hypoalbuminemia: Lowest albumin = 1.9 g/dL (Ref range: 3.5-5.2) at 10/25/2022 11:39 PM, will monitor as appropriate           # Moderate Malnutrition: based on nutrition assessment, PRESENT ON ADMISSION       ______________________________________________________________________    Interval History   Patient lying in bed, awake, appears comfortable. Does not answer questions appropriate, always responding no. Unable to obtain ROS due to mental  status.     Data reviewed today: I reviewed all medications, new labs and imaging results over the last 24 hours.     Physical Exam   Vital Signs: Temp: 97.8  F (36.6  C) Temp src: Axillary BP: (!) 160/63 Pulse: 68   Resp: 18 SpO2: 98 % O2 Device: None (Room air)    Weight: 113 lbs 12.8 oz   GENERAL: Alert and awake. Does not answer questions appropriately. NAD.   HEENT: Icteric sclera. Mucous membranes moist   CARDIOVASCULAR: RRR. S1, S2. No murmurs, rubs, or gallops.   RESPIRATORY: Effort normal on RA. Clear to auscultation bilaterally, no rales, rhonchi or wheezes, respirations unlabored   GI: Abdomen soft, TTP in RUQ abdomen without rebound or guarding, Mass palpated in RUQ, normoactive bowel sounds present   EXTREMITIES: No peripheral edema. No calf asymmetry, erythema, or tenderness.   NEUROLOGICAL: CN II-XII grossly intact. Moving all extremities symmetrically.   SKIN: Intact. Warm and dry. Jaundice.    Data   Medications       buprenorphine  1 patch Transdermal Weekly     buprenorphine   Transdermal Q8H     miconazole   Topical BID     miconazole  200 mg Vaginal At Bedtime     sodium chloride (PF)  3 mL Intracatheter Q8H     sodium chloride (PF)  68 mL Intravenous Once

## 2022-11-04 NOTE — PLAN OF CARE
No change in goal outcome  Patient oriented to self only. Unable to answer the orientation questions. Gave 10 of morphine for left sided abdominal pain. Patient slept well overnight. No acute changes. Continue plan of care.

## 2022-11-04 NOTE — PROGRESS NOTES
[facility]  Palliative Care Daily Progress Note       Recommendations & Counseling       DNR/DNI, comfort focused treatments as attempting to get her outside the hospital with hospice pending location either at a LTC or at home    Added butrans patch 5 mcg/hr    Added haldol 2 mg IV/IM q6h for agitation if unable to take PO successfully as a last resort    Discontinued scheduled morphine and would use PRN only given addition of butrans patch    Also continue to attempt to give lorazepam PRN for agitation    Continue non pharm anxiety/agitation interventions    Attempted to call family regarding these updates in medications, unable to reach          Assessments          Maria Guadalupe Betancourt is a 67 year old female who presented with LE swelling in the setting of cirrhosis and known gallbladder cancer, not on treatment. Patient was on hospice prior to presentation to the ED.      Today, the patient was seen for:  Gallbladder cancer  Cirrhosis  AMS  UTI    Prognosis, Goals, or Advance Care Planning was addressed today with: No.    Mood, coping, and/or meaning in the context of serious illness were addressed today: Yes.  Summary/Comments: agitated and confused, appeared in pain from UTI            Interval History:     Chart review/discussion with unit or clinical team members:   Notes reviewed, refusing care and medications, having abdominal pain and did take pain medicine over night, [facility] hospice consulted and not appropriate for services at this time.    Per patient or family/caregivers today:  Seen laying in bed, had episode of restlessness and moaning, agitated and confused, appeared in pain from UTI    Key Palliative Symptoms:  # Pain severity the last 12 hours: moderate  # Anxiety severity the last 12 hours: severe               Review of Systems:     Besides above, an additional ROS unable to be completed as patient unable to participate in interview.            Medications:     I have reviewed this patient's medication profile and medications during this hospitalization.    Noted meds:    Lidocaine patch  Morphine 5 mg TID- taking inconsistently  Trazodone at bedtime- not taking  Morphine PRN- x1             Physical Exam:   Vitals were reviewed  Temp: 97.8  F (36.6  C) Temp src: Axillary BP: (!) 160/63 Pulse: 68   Resp: 18 SpO2: 98 % O2 Device: None (Room air)      Intake/Output Summary (Last 24 hours) at 11/4/2022 0739  Last data filed at 11/3/2022 1800  Gross per 24 hour   Intake 120 ml   Output 200 ml   Net -80 ml     Constitutional: resting comfortably, chronically ill appearing, jaundiced, no apparent distress  ENT: Normocephalic, without obvious abnormality, atramatic  Lungs: No increased work of breathing, good air exchange  Musculoskeletal: No redness, warmth, or swelling of the joints.   Neurologic: patient sleeping, deferred  Skin: No rashes, erythema, jaundiced                Data Reviewed:     Reviewed recent pertinent imaging, comments:   CT abd/pelvis 10/28  IMPRESSION:   1. Prominent intrahepatic biliary dilatation leading up to the   confluence of the central and left bile ducts and to a lesser degree   the right bile duct which is suspicious for obstructing tumor such as   the patient's known history of locally advanced gallbladder carcinoma.   This appears to be new from CT report 7/25/2022. Images not available   for direct comparison. If the patient is agreeable to an intravenous   contrast study, recommend follow-up CT or MRI/MRCP study.   2. Mild perihepatic and low pelvic ascites.   3. Concern for cranial rotation of the left total hip arthroplasty   acetabular cup, comparison with outside exams if they can be obtained   would be helpful to determine chronicity   4. Small amount of fat stranding about the sigmoid colon with possible   wall thickening which may be related to generalized mesenteric edema   or superimposed uncomplicated diverticulitis.  Correlation with any GI   symptoms are focal tenderness in this region.   5. Limited evaluation for metastatic disease on noncontrast exam which   can be reevaluated on any follow-up contrast study.     I have personally reviewed the examination and initial interpretation   and I agree with the findings.     Reviewed recent labs, comments:   UA wbc 39, moderate nitrates, cultures pending      SYLVIA Garcia CNS  Palliative Care Consult Team  Pager: [phone]    35 minutes spent. This includes 20 minutes face to face with patient and care team discussing current complex health conditions and symptom management. Coordination of care with the primary team, palliative  and SW, and RN regarding symptom management and goals of care.

## 2022-11-04 NOTE — CONSULTS
[facility]    Progress Note - [facility]    ______________________________________________________________________    [facility] 24/7 Contact Number: [phone]    - Providers: Please contact [facility] with changes in orders or clinical plan of care   - Nursing: Please contact [facility] with significant changes in patient condition  ______________________________________________________________________        Summary of Visit (includes assessment, medications and any new orders):   Met w/ patient  At the bedside this afternoon. Patient appears GIP eligible and has EOL symptoms requiring monitoring. Attempted to call pt granddaughter Flip, no answer and VM box was full, sent text instead. Awaiting response at this time.        Alexandria Villanueva, RN  [phone]

## 2022-11-04 NOTE — PROGRESS NOTES
Prior Authorization Approval    Buprenorphine 5mcg/hr weekly patches  Date Initiated: 11/4/2022  Date Completed: 11/4/2022  Prior Auth Type: Non-Formulary                Status: Approved    Effective Date: 10/05/2022 - 11/04/2023  Copay: 1.00     Filling Pharmacy: [facility] - [address]    Insurance: [facility] - Phone [phone] Fax [phone]  ID: [id number]  Case Number: [id number] / [id number]   Submitted Via: Solo Hernández  [facility] Liaison  Ph: [phone] Pager: [phone]

## 2022-11-04 NOTE — PLAN OF CARE
Goal Outcome Evaluation:      Plan of Care Reviewed With: patient    Overall Patient Progress: no changeOverall Patient Progress: no change     Pt alert, oriented to self. VSS on RA- Hypertensive. Refusing medications intermittently. Miconazole cream given for vaginal itching. Pt accepted one dose of scheduled Morphine for pain. Bed alarm in place. Regular diet, not much appetite. Up with A1 and walker in room and to bathroom. No PIV. UA dirty, UC ordered. Hospice consult in place. Continue with POC.     Observation Goals:  Mental status at baseline: met, alert and oriented to self.  BP stable on oral medications: not met, Pt refusing medications.  Abx plan in place: not met, Pt refusing medications.  Paracentesis completed: not met; Pt refused.

## 2022-11-05 NOTE — PROGRESS NOTES
[facility]    Medicine Progress Note - Hospitalist Service, GOLD TEAM 4    Date of Admission:  10/25/2022    Assessment & Plan   Maria Guadalupe Betancourt is a 66 yo F with PMHx of gallbladder cancer, HCV cirrhosis, IDDM2, Hx of CVA, anxiety, cognitive impairment, and schizophrenia, recently initiated on hospice, brought into the ED for LE edema and jaundice admitted on 10/25/2022 with family changes in GOC to pursue goal directed therapy. After multiple discussions, family now agreeable to focus on comfort and re-pursue hospice options close to home.     # GOC  - Please see GOC discussion and care conference documentation on 10/28 for details. Patient is not decisional, and grand-daughter Flip assisting with medical decision making. Family is aware of terminal state of cancer, and not interested in chemo/radiation, or invasive procedures.  Initially interested in some restorative cares, but after further discussion, family would like to re-pursue hospice. After discussion with family, okay to stop blood pressure medications, stop blood sugar checks, and no blood draws. Family is still okay with treating infections if it provides some comfort.   - Appreciate palliative care assistance  - DNR/DNI, no ICU  - Butrans 5 mcg/hr, Roxanol 5-10 mg Q4H PRN pain, ativan 0.5 mg Q4H PRN  - Haldol 2 mg IV/IM q6H for agitation if unable to take PO   - Appreciate SW assistance with placement, either hospice facility or home with hospice. Now accepted to [facility], but they have not been able to contact family     # Worsening jaundice and LE edema  # Gallbladder cancer   # Intrahepatic and extrahepatic biliary duct dilation   # HCV Cirrhosis  Patient dx with T3 gallbladder cancer after laparoscopic cholecystectomy for acute cholecystitis 4/2022. Patient previously evaluated by hepatobiliary and pancreatic surgery and oncology and no surgical or chemotherapy treatments were offered due to  multiple co-morbidities and placed on hospice two months ago.  Patient brought in by family with worsening jaundice and LE edema and revoked hospice care. Elevated alk phos 400s, and T bili 21 concerning for progression of disease with worsening liver failure with MELD 28 and INR 2.42 Abdominal US with CBD dilation 17 mm concerning for biliary obstruction from progressive cancer. Evaluated by GI who recommended CT abd/pelvis with intrahepatic biliary dilation suspicious for obstructing tumors and possible diverticulitis. GI considered palliative stenting but family not interested invasive procedures at this time. Oncology consulted would not offer any cancer directed therapy at this time. Attempted to get paracentesis to evaluate for SBP, but patient refused.   - Appreciate GI, oncology and IR consult  - Lasix 20 mg daily PRN for edema   - Atarax PRN itching, Sarna cream     # UTI - UA with + nitrites, LE and bacteria. Patient unable to tell if she has symptoms. UCx + for E. Coli. Patient completed 5 days of antibiotics (Ceftriaxone --> Keflex --> cefdinir). Patient has refused last 2 days of abx. Now reporting dysuria again to nursing staff and vaginal itching. Repeat UA appears contaminated and culture with urogenital yehuda. Possibly vaginal candidiasis after recent antibiotics.   - Miconazole vaginal suppository and cream  - Pyridium 100 mg BID PRN      # Dementia - Patient previously residing in memory care unit with prior SLUMS score reportedly <10/30. OT consulted and repeat SLUM this admission 1/30. Patient does not have capacity to make her own decision. Family assisting with medical decision making. Patient requires 24/7 care.     # Anxiety, Schizophrenia - Patient previously on Ativan 0.5 mg Q4H PRN, olanzapein 10 mg BID, mirtazapine 15 mg at bedtime, Seroquel 100 mg at bedtime, and trazodone 50 mg at bedtime, but stopped when transitioned to hospice.   - Continue Ativan liquid 0.5 mg Q4H PRN and trazodone  50 mg at bedtime PRN    # HTN - Stop coreg after discussion with family as not providing comfort, patient is refusing, and blood pressure is only moderately elevated     # DM2 - previously on insulin (Lantus 35 Units daily with Novolog sliding scale insulin), but stopped when transitioned to hospice. Patient not eating and refusing meals, so stop Lantus and Novolog carb coverage.   - After discussion with family will stop blood sugar checks        Diet: Regular Diet Adult    DVT Prophylaxis: Hospice, comfort focused   Hunter Catheter: Not present  Central Lines: None  Cardiac Monitoring: None  Code Status: No CPR- Do NOT Intubate      Disposition Plan      Medications       buprenorphine  1 patch Transdermal Weekly     buprenorphine   Transdermal Q8H     miconazole   Topical BID     miconazole  200 mg Vaginal At Bedtime     sodium chloride (PF)  3 mL Intracatheter Q8H     sodium chloride (PF)  68 mL Intravenous Once        The patient's care was discussed with the Attending Physician, Dr. Elvi Gutiérrez, Bedside Nurse and Patient.    Yani Callahan PA-C  Hospitalist Service, GOLD TEAM [address] [place] [address]  Securely message with the Vocera Web Console (learn more here)  Text page via [facility] Paging/Directory   Please see signed in provider for up to date coverage information      Clinically Significant Risk Factors              # Hypoalbuminemia: Lowest albumin = 1.9 g/dL (Ref range: 3.5-5.2) at 10/25/2022 11:39 PM, will monitor as appropriate           # Moderate Malnutrition: based on nutrition assessment, PRESENT ON ADMISSION       ______________________________________________________________________    Interval History   Patient lying in bed, sleeping, but easily to awake, appears comfortable. Does not answer questions appropriate, always responding no. Unable to obtain ROS due to mental status.     Data reviewed today: I reviewed all medications, new labs and imaging results  over the last 24 hours.     Physical Exam   Vital Signs: Temp: 97.8  F (36.6  C) Temp src: Oral BP: (!) 158/73 Pulse: 68   Resp: 16 SpO2: 99 % O2 Device: None (Room air)    Weight: 113 lbs 12.8 oz   GENERAL: Alert and awake. Does not answer questions appropriately. NAD.   HEENT: Icteric sclera. Mucous membranes moist   CARDIOVASCULAR: RRR. S1, S2. No murmurs, rubs, or gallops.   RESPIRATORY: Effort normal on RA. Clear to auscultation bilaterally, no rales, rhonchi or wheezes, respirations unlabored   GI: Abdomen soft, TTP in RUQ abdomen without rebound or guarding, Mass palpated in RUQ, normoactive bowel sounds present   EXTREMITIES: No peripheral edema. No calf asymmetry, erythema, or tenderness.   NEUROLOGICAL: CN II-XII grossly intact. Moving all extremities symmetrically.   SKIN: Intact. Warm and dry. Jaundice.    Data   Medications       buprenorphine  1 patch Transdermal Weekly     buprenorphine   Transdermal Q8H     miconazole   Topical BID     miconazole  200 mg Vaginal At Bedtime     sodium chloride (PF)  3 mL Intracatheter Q8H     sodium chloride (PF)  68 mL Intravenous Once

## 2022-11-05 NOTE — PLAN OF CARE
Goal Outcome Evaluation:      Plan of Care Reviewed With: patient    Overall Patient Progress: no changeOverall Patient Progress: no change    Outcome Evaluation: Pt oriented to self. VSS on RA. Slept well overnight. T-pump in use. Refused RN assesments. Denies pain/Nausea overnight. Up ind to bathroom, reorient to environment. Bed alarm on.

## 2022-11-05 NOTE — PLAN OF CARE
Goal Outcome Evaluation:        Mental status at baseline Met  BP stable on oral medications Not met. Refusing meds  Abx plan in place not met refusing meds   Paracentesis completed Not met

## 2022-11-05 NOTE — PLAN OF CARE
Goal Outcome Evaluation:       Pt had been sleeping most of the day, appeared comfortable, hard to wake up. This writer had a hard time delivering cares to pt since pt refused most of everything. Inc of stools.  and 138. Still remain on Obs status for now, per primary team. Awaiting placement.

## 2022-11-05 NOTE — PROGRESS NOTES
Care Management Follow Up    Length of Stay (days): 1    Expected Discharge Date: TBD     Concerns to be Addressed: Discharge planning       Patient plan of care discussed at interdisciplinary rounds: No    Anticipated Discharge Disposition: GIP vs OLOP, vs family members home     Anticipated Discharge Services: TBD      Patient/family educated on Medicare website which has current facility and service quality ratings:    Education Provided on the Discharge Plan:  Yes  Patient/Family in Agreement with the Plan:      Referrals Placed by CM/SW:    Private pay costs discussed: Not applicable    Additional Information:    Update 11/5/22 @ 9:12 am  Phone in from Cesar at [facility]. Stating he assumed pt was at [facility], and will now have someone else reach out to  regarding referral. SW awaiting a call back.    JOSE MIGUEL called [facility] to follow up on referral. JOSE MIGUEL spoke to Zakiya at [facility] who stated she will pass SW contact information along to on-call RN and to wait for a call back.   JOSE MIGUEL received a call from  Zaki at [facility]. He confirmed referral and explained he will pass the information to Cesar, clinical liaison, to review and who is working this weekend. Zaki stated he Cesar will be in contact with SW and pt. Zaki mentioned it is likely an intake will not happen over the weekend.  SW will wait to hear from Cesar( [phone])      CHIDI Nunes   Weekend   Pager: [phone]

## 2022-11-05 NOTE — CONSULTS
[facility]    Progress Note - [facility]    ______________________________________________________________________    [facility] 24/7 Contact Number: [phone]    - Providers: Please contact [facility] with changes in orders or clinical plan of care   - Nursing: Please contact [facility] with significant changes in patient condition  ______________________________________________________________________        Summary of Visit (includes assessment, medications and any new orders):   Received call from triage requesting to f/u w/ hospital  today regarding patient GIP consult. Attempted to page MSW x2 this AM. Awaiting response.    Patient is eligible for GIP hospice. Writer attempted to contact family daily since 11/3 w/ no response. Will continue to try to contact family to discuss GIP hospice.        Alexandria Villanueva, RN  [phone]

## 2022-11-06 NOTE — PROGRESS NOTES
Observation Goals:    Mental status at baseline Met  BP stable on oral medications Not met. Refusing meds  Abx plan in place not met refusing meds   Paracentesis completed Not met

## 2022-11-06 NOTE — PLAN OF CARE
Goal Outcome Evaluation:      Plan of Care Reviewed With: patient      A&O to self only. VSS stable. Bed alarm on. Gets up to use bathroom, incont of bowel and bladder. 2 Bm this shift. 1 small black tarry stool. Team notified. Will continue to monitor BM for more. Refused all medications. No c/o pain. Patch free. C/o vaginal burning and itching.

## 2022-11-06 NOTE — PLAN OF CARE
Goal Outcome Evaluation:      Plan of Care Reviewed With: patient    Overall Patient Progress: no changeOverall Patient Progress: no change    Outcome Evaluation: Pt only oriented to self. AVSS on RA except HTN. Pt had 5 black loose stools with red streaks, provider notified. Pt agitated and combative throughout the night. Pt did not sleep and yelling out often. Pt does not use call light. Bed alarm on for safety.

## 2022-11-06 NOTE — PLAN OF CARE
Goal Outcome Evaluation:      Plan of Care Reviewed With: patient    Overall Patient Progress: no change    Outcome Evaluation: A&O x 1- oriented to person only.  VSS on room air except HTN at times.  c/o abdominal pain 8/10 and Morphine given x 1 with good relief.  Attempted to give morphine a second time for pain 8/10 but pt refused.  MD aware of abdominal pain.  No agitation and not combative this shift.  Very tired today.  Poor appetite noted.  Bed alarm for safety.  Electrolyte replacement protocols discontinued today as pt moving more toward comfort measures.

## 2022-11-06 NOTE — PROGRESS NOTES
[facility]    Medicine Progress Note - Hospitalist Service, GOLD TEAM 4    Date of Admission:  10/25/2022    Assessment & Plan   Maria Guadalupe Betancourt is a 68 yo F with PMHx of gallbladder cancer, HCV cirrhosis, IDDM2, Hx of CVA, anxiety, cognitive impairment, and schizophrenia, recently initiated on hospice, brought into the ED for LE edema and jaundice admitted on 10/25/2022 with family changes in GOC to pursue goal directed therapy. After multiple discussions, family now agreeable to focus on comfort and re-pursue hospice options close to home.     # GOC  - Please see GO discussion and care conference documentation on 10/28 for details. Patient is not decisional, and grand-daughter Flip assisting with medical decision making. Family is aware of terminal state of cancer, and not interested in chemo/radiation, or invasive procedures.  Initially interested in some restorative cares, but after further discussion, family would like to re-pursue hospice. After discussion with family, okay to stop blood pressure medications, stop blood sugar checks, and no blood draws. Family is still okay with treating infections if it provides some comfort.   - Appreciate palliative care assistance  - DNR/DNI, no ICU  - Butrans 5 mcg/hr, Roxanol 5-10 mg Q2H PRN pain, ativan 0.5 mg Q4H PRN  - Haldol 2 mg IV/IM q6H for agitation if unable to take PO   - Appreciate SW assistance with placement, either hospice facility or home with hospice. Now accepted to [facility], and family is interested. They want to talk to other family members first, and will likely decide tomorrow. Family will be in tomorrow around 9:30AM.     # Worsening jaundice and LE edema  # Gallbladder cancer   # Intrahepatic and extrahepatic biliary duct dilation   # HCV Cirrhosis  Patient dx with T3 gallbladder cancer after laparoscopic cholecystectomy for acute cholecystitis 4/2022. Patient previously evaluated by hepatobiliary and  pancreatic surgery and oncology and no surgical or chemotherapy treatments were offered due to multiple co-morbidities and placed on hospice two months ago.  Patient brought in by family with worsening jaundice and LE edema and revoked hospice care. Elevated alk phos 400s, and T bili 21 concerning for progression of disease with worsening liver failure with MELD 28 and INR 2.42 Abdominal US with CBD dilation 17 mm concerning for biliary obstruction from progressive cancer. Evaluated by GI who recommended CT abd/pelvis with intrahepatic biliary dilation suspicious for obstructing tumors and possible diverticulitis. GI considered palliative stenting but family not interested invasive procedures at this time. Oncology consulted would not offer any cancer directed therapy at this time. Attempted to get paracentesis to evaluate for SBP, but patient refused.   - Appreciate GI, oncology and IR consult  - Lasix 20 mg daily PRN for edema   - Atarax PRN itching, Sarna cream     # UTI - UA with + nitrites, LE and bacteria. Patient unable to tell if she has symptoms. UCx + for E. Coli. Patient completed 5 days of antibiotics (Ceftriaxone --> Keflex --> cefdinir). Patient has refused last 2 days of abx. Now reporting dysuria again to nursing staff and vaginal itching. Repeat UA appears contaminated and culture with urogenital yehuda. Possibly vaginal candidiasis after recent antibiotics.   - Miconazole vaginal suppository and cream  - Pyridium 100 mg BID PRN      # Dementia - Patient previously residing in memory care unit with prior SLUMS score reportedly <10/30. OT consulted and repeat SLUM this admission 1/30. Patient does not have capacity to make her own decision. Family assisting with medical decision making. Patient requires 24/7 care.     # Anxiety, Schizophrenia - Patient previously on Ativan 0.5 mg Q4H PRN, olanzapein 10 mg BID, mirtazapine 15 mg at bedtime, Seroquel 100 mg at bedtime, and trazodone 50 mg at bedtime,  but stopped when transitioned to hospice.   - Continue Ativan liquid 0.5 mg Q4H PRN and trazodone 50 mg at bedtime PRN    # HTN - Stop coreg after discussion with family as not providing comfort, patient is refusing, and blood pressure is only moderately elevated     # DM2 - previously on insulin (Lantus 35 Units daily with Novolog sliding scale insulin), but stopped when transitioned to hospice. Patient not eating and refusing meals, so stop Lantus and Novolog carb coverage.   - After discussion with family will stop blood sugar checks        Diet: Regular Diet Adult    DVT Prophylaxis: Not indicated as patient on comfort measures.   Hunter Catheter: Not present  Central Lines: None  Cardiac Monitoring: None  Code Status: No CPR- Do NOT Intubate      Disposition Plan   { TIP  Expected discharge date has passed or is blank! Click here to update expected discharge date and refresh note (tipsheet)     :[id number]}    The patient's care was discussed with the Attending Physician, Dr. Elvi Gutiérrez, Bedside Nurse, Care Coordinator/, Patient and Patient's Family.    Yani Callahan PA-C  Hospitalist Service, [address]  Securely message with the Vocera Web Console (learn more here)  Text page via [facility] Paging/Directory   Please see signed in provider for up to date coverage information      Clinically Significant Risk Factors              # Hypoalbuminemia: Lowest albumin = 1.9 g/dL (Ref range: 3.5-5.2) at 10/25/2022 11:39 PM, will monitor as appropriate           # Moderate Malnutrition: based on nutrition assessment, PRESENT ON ADMISSION       ______________________________________________________________________    Interval History   Patient awake this morning in bed, with pancakes for breakfast. Per nursing she had one bite. Patient moaning in pain, in her abdomen, asking for pain medications. Patient denies any N/V or anxiety. Unable to obtain  further history due to mental status. Per nursing, patient had 5 episodes of black stools overnight.     Data reviewed today: I reviewed all medications, new labs and imaging results over the last 24 hours.    Physical Exam   Vital Signs: Temp: 97.7  F (36.5  C) Temp src: Axillary BP: (!) 140/62 Pulse: 73   Resp: 16 SpO2: 98 % O2 Device: None (Room air)    Weight: 113 lbs 12.8 oz  GENERAL: Alert and awake. Answering occasional questions appropriately. Appears uncomfortable. Pleasant and conversational.  HEENT: AT/NC. Icteric sclera.  Mucous membranes moist   CARDIOVASCULAR: RRR. S1, S2. No murmurs, rubs, or gallops.   RESPIRATORY: Effort normal on RA.  GI: Abdomen soft, TTP in RUQ and epigastrium without rebound or guarding, palpable mass in RUQ. normoactive bowel sounds present  EXTREMITIES: No peripheral edema.   NEUROLOGICAL: CN II-XII grossly intact. Moving all extremities symmetrically.   SKIN: Intact. Warm and dry. + Jaundice     Data   Recent Labs   Lab 11/06/22  0849 11/06/22  0152 11/05/22  1341   * 144* 138*     No results found for this or any previous visit (from the past 24 hour(s)).  Medications       buprenorphine  1 patch Transdermal Weekly     buprenorphine   Transdermal Q8H     miconazole   Topical BID     miconazole  200 mg Vaginal At Bedtime     sodium chloride (PF)  68 mL Intravenous Once

## 2022-11-06 NOTE — PROGRESS NOTES
OBS goals:     Mental status at baseline: met   BP stable on oral medications: not met- discontinued as shifting toward comfort measures  Abx plan in place: not met  Paracentesis completed: not met

## 2022-11-06 NOTE — CONSULTS
Received call that pt family will be coming in tomorrow morning. [facility] hospice will plan to try to met w/ them in person. Please contact  hospice CNL Luisa Martin [phone] when family arrives.     Thank you for the consult,    Alexandria Villanueva [phone]

## 2022-11-07 NOTE — PLAN OF CARE
Goal Outcome Evaluation:      Plan of Care Reviewed With: patient    Overall Patient Progress: no changeOverall Patient Progress: no change    Outcome Evaluation: Pt alert, oriented only to self. AVSS except HTN on RA. Pt incontinent of bowel and bladder x2. C/o itching, pt has scratch marks on back, lotion applied. Pt got some sleep between cares. Bed alarm on.

## 2022-11-07 NOTE — PROGRESS NOTES
Observation Goals    Mental status at baseline MET  BP stable on oral medications NOT MET  Abx plan in place NOT MET  Paracentesis completed NOT MET

## 2022-11-07 NOTE — PROGRESS NOTES
IH visit with Hospice consents signed.   MARIJA [facility]   [phone]

## 2022-11-07 NOTE — CONSULTS
"[facility]    Consult Note - [facility]    ______________________________________________________________________    [facility] 24/7 Contact Number: [phone]    - Providers: Please contact [facility] with changes in orders or clinical plan of care   - Nursing: Please contact [facility] with significant changes in patient condition    Hospice will notify the care team (including the hospitalist) to confirm date of inpatient hospice (GIP) admission.    New Epic encounter will not be created until hospice completes admission.   ______________________________________________________________________        Hospice Diagnosis: Gallbladder Cancer     Indication for Inpatient Hospice: Terminal agitation/pain        Plan of Care Discussed with the Following:   - Nurse: Susana  - Charge Nurse: Elisha  - Hospitalist/Rounding Provider: Yani Knapp  - Maria Guadalupe's Family/Preferred Contact: Flip  - Hospice Provider: Dr. Usama Loco    Summary of Visit (includes assessment, medications and any new orders):     Met with granddaughterFlip at pt's bedside.  Pt is alert to self only.   Hospice MSW present, Kerri Olivarez.  Reviewed [facility] hospice benefits and eletronic consents signed.  Emotional and grief support provided.  Education on EOL signs and provided \"Gone from My [employer] Booklet\".  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    Hospice MD, Dr. Usama Loco recommends:  Haldol 2mg SL Q6H for terminal agitation  Haldol 1-2 mg SL  Q4H PRN terminal agitation    [facility] will RN will see daily and montior.      REGINO YeagerN, RN  Clinical Nurse Liaison I [facility] I [facility]  Cell: [phone]  [facility]  Office: [phone]  Email [email or web address]    [email or web address]  "

## 2022-11-07 NOTE — PLAN OF CARE
Goal Outcome Evaluation:      Plan of Care Reviewed With: patient    Overall Patient Progress: decliningOverall Patient Progress: declining    Outcome Evaluation: alert to self. VSS on room air. jaundiced skin, restless. terminal agitation. consulted with [facility] hospice today. pulls clothes/sheets off, bed alarm on.PRN ativan for agitation, PRN morphine for pain. says she is in pain, unable to specify where.    Plan to switch to general inpatient hospice care.

## 2022-11-07 NOTE — H&P
[facility]    History and Physical - Hospitalist Service - [facility]        Date of Admission: 11/7/2022    Assessment & Plan      Maria Guadalupe Betancourt is a 66 yo F with PMHx of gallbladder cancer, HCV cirrhosis, IDDM2, Hx of CVA, anxiety, cognitive impairment, and schizophrenia, recently initiated on hospice, admitted on 10/25/22 for LE edema and jaundice with family changes in GOC to pursue goal directed therapy. Patient was found to have progression of her gallbladder cancer with intrahepatic and extrahepatic biliary duct dilation concerning for biliary obstruction from cancer and progressive ESLD. After multiple discussions and care conferences, family now agreeable to focus on comfort and re-pursue hospice, accepted to [facility] hospice. Discharged and re-admitted to [facility] hospice on 11/7/2022. Please see same day discharge summary for prior hospital course details.     # Comfort cares  - Continue butrans patch 5 mcg/hr  - Roxanol 5-10 mg Q1H PRN pain  - Ativan solution 0.5 mg Q3H PRN pain   - Haldol 2 mg Q6H standing and 1-2 mg Q4H PRN agitation   - oral and eye care  - Reposition as needed for comfort  - Sarna cream as needed for itching   - No lab draws, vitals       Diet:  ADAT  DVT Prophylaxis: Not indicated on hospice.   Hunter Catheter: Not present  Central Lines: None  Cardiac Monitoring: None  Code Status:  DNR/DNI     Clinically Significant Risk Factors Present on Admission                              Disposition Plan       The patient's care was discussed with the Attending Physician, Dr. Elvi Gutiérrez, Bedside Nurse, Care Coordinator/, Patient and Patient's Family.    Yani Callahan PA-C  Hospitalist Service    Securely message with the Vocera Web Console (learn more here)  Text page via [facility] Paging/Directory         ______________________________________________________________________    Chief Complaint   Abdominal pain, worsening jaundice     Unable  to obtain a history from the patient due to confusion. History obtained from chart review and family     History of Present Illness   Maria Guadalupe Betancourt is a 66 yo F with PMHx of gallbladder cancer, HCV cirrhosis, IDDM2, Hx of CVA, anxiety, cognitive impairment, and schizophrenia, recently initiated on hospice, admitted on 10/25/22 for LE edema and jaundice with family changes in GOC to pursue goal directed therapy. Patient was found to have progression of her gallbladder cancer with intrahepatic and extrahepatic biliary duct dilation concerning for biliary obstruction from cancer and progressive ESLD. After multiple discussions and care conferences, family now agreeable to focus on comfort and re-pursue hospice, accepted to [facility] hospice. Discharged and re-admitted to [facility] hospice on 11/7/2022. Please see same day discharge summary for prior hospital course details.     Patient appears comfortable, intermittently agitated. Occasionally reports pain when asked. Has not been eating for the last week, but with family was able to eat some mashed potatoes.     Review of Systems    Review of systems not obtained due to patient factors - confusion and mental status    Past Medical History    I have reviewed this patient's medical history and updated it with pertinent information if needed.   Past Medical History:   Diagnosis Date     Cirrhosis (H)      CVA (cerebral vascular accident) (H)      Encounter for other orthopedic aftercare     Multiple bone fractures in the past     Hepatitis C      HSV (herpes simplex virus) anogenital infection     Ocular     Lung abscess (H)      Schizophrenia (H)      Seizure (H)      Syncope and collapse      Type 2 diabetes mellitus (H)        Past Surgical History   I have reviewed this patient's surgical history and updated it with pertinent information if needed.  Past Surgical History:   Procedure Laterality Date     ------------OTHER-------------      Left wrist surgery       Social  History   I have reviewed this patient's social history and updated it with pertinent information if needed.  Social History     Tobacco Use     Smoking status: Some Days   Substance Use Topics     Alcohol use: Not Currently       Family History     Unable to obtain due to: patient not answering questions appropriately     Prior to Admission Medications   Cannot display prior to admission medications because the patient has not been admitted in this contact.     Allergies   Allergies   Allergen Reactions     Sulfur        Physical Exam   Vital Signs:                    Weight: 0 lbs 0 oz  GENERAL: Alert and awake. Does not answer questions appropriately. Appears comfortable.   HEENT: Icteric sclera.  Dry membranes moist   CARDIOVASCULAR: RRR. S1, S2. No murmurs, rubs, or gallops.   RESPIRATORY: Effort normal on RA.   GI: Abdomen soft, TTP in RUQ with palpable mass. normoactive bowel sounds present  EXTREMITIES: No peripheral edema.  NEUROLOGICAL:  CN II-XII grossly intact. Moving all extremities symmetrically.   SKIN: Intact. Warm and dry. + jaundice.     Data   Data reviewed today: I reviewed all medications, new labs and imaging results over the last 24 hours.     Recent Labs   Lab 11/06/22  0849 11/06/22  0152 11/05/22  1341   * 144* 138*     No results found for this or any previous visit (from the past 24 hour(s)).

## 2022-11-07 NOTE — DISCHARGE SUMMARY
[facility]  Hospitalist Discharge Summary      Date of Admission:  10/25/2022  Date of Discharge:  11/7/2022  Discharging Provider: Yani Callahan PA-C / Elvi Gutiérrez MD   Discharge Service: Hospitalist Service, GOLD TEAM 4    Discharge Diagnoses   Gallbladder cancer  Intrahepatic and extra heptaic biliary duct dilation, likely obstruction from gallbladder mass  ESLD  UTI, resolved     Follow-ups Needed After Discharge   Not indicated, transitioning to [facility] hospice     Unresulted Labs Ordered in the Past 30 Days of this Admission     No orders found from 9/25/2022 to 10/26/2022.          Discharge Disposition   Discharged to [facility] hospice   Condition at discharge: Poor    Hospital Course   Maria Guadalupe Betancourt is a 66 yo F with PMHx of gallbladder cancer, HCV cirrhosis, IDDM2, Hx of CVA, anxiety, cognitive impairment, and schizophrenia, recently initiated on hospice, brought into the ED for LE edema and jaundice admitted on 10/25/2022 with family changes in GOC to pursue goal directed therapy. After multiple discussions and care conferences, family now agreeable to focus on comfort and re-pursue hospice, accepted to [facility] hospice. Discharged and re-admitted to [facility] hospice on 11/7/2022.     # GOC  - Please see GOC discussion and care conference documentation on 10/28 for details. Patient is not decisional, and grand-daughter Flip assisting with medical decision making. Family is aware of terminal state of cancer, and not interested in chemo/radiation, or invasive procedures. Initially family was nterested in some restorative cares, but after further discussion, family would like to re-pursue hospice, accepted to [facility] on 11/7 and family agrees.  Palliative care consulted, appreciate recommendations with symptom management, starting butrans patch and roxanol PRN.    # Worsening jaundice and LE edema  # Gallbladder cancer   # Intrahepatic and extrahepatic biliary duct dilation    # HCV Cirrhosis  Patient dx with T3 gallbladder cancer after laparoscopic cholecystectomy for acute cholecystitis 4/2022. Patient previously evaluated by hepatobiliary and pancreatic surgery and oncology and no surgical or chemotherapy treatments were offered due to multiple co-morbidities and placed on hospice two months ago.  Patient brought in by family with worsening jaundice and LE edema and revoked hospice care. Elevated alk phos 400s, and T bili 21 concerning for progression of disease with worsening liver failure with MELD 28 and INR 2.42 Abdominal US with CBD dilation 17 mm concerning for biliary obstruction from progressive cancer. Evaluated by GI who recommended CT abd/pelvis with intrahepatic biliary dilation suspicious for obstructing tumors and possible diverticulitis. GI considered palliative stenting but family not interested invasive procedures at this time. Oncology consulted would not offer any cancer directed therapy at this time. Attempted to get paracentesis to evaluate for SBP, but patient refused. GOC discussion now plan for [facility]IP hospice and comfort cares.     # UTI - UA with + nitrites, LE and bacteria. Patient unable to tell if she has symptoms. UCx + for E. Coli. Patient completed 5 days of antibiotics (Ceftriaxone --> Keflex --> cefdinir). Patient has refused last 2 days of abx. Now reporting dysuria again to nursing staff and vaginal itching, likely 2/2 vaginal candidiasis. Family wants to treat infections if helps, started on Miconazole vaginal suppository and cream and Pyridium 100 mg BID PRN.      # Dementia - Patient previously residing in memory care unit with prior SLUMS score reportedly <10/30. OT consulted and repeat SLUM this admission 1/30. Patient does not have capacity to make her own decision. Family assisting with medical decision making. Patient requires 24/7 care.     # Anxiety, Schizophrenia - Patient previously on Ativan 0.5 mg Q4H PRN, olanzapein 10 mg BID,  mirtazapine 15 mg at bedtime, Seroquel 100 mg at bedtime, and trazodone 50 mg at bedtime, but stopped when transitioned to hospice. Continue haldol PRN, and ativan PRN anxiety and agitation.     # HTN - Stop coreg after discussion with family as not providing comfort, patient is refusing, and blood pressure is only moderately elevated     # DM2 - previously on insulin (Lantus 35 Units daily with Novolog sliding scale insulin), but stopped when transitioned to hospice. Patient not eating and refusing meals, so stop Lantus and Novolog carb coverage and blood sugar checks.     Consultations This Hospital Stay   INTERVENTIONAL RADIOLOGY ADULT/PEDS IP CONSULT  GI HEPATOLOGY ADULT IP CONSULT  ONCOLOGY ADULT IP CONSULT  PALLIATIVE CARE ADULT IP CONSULT  NURSING TO CONSULT FOR VASCULAR ACCESS CARE IP CONSULT  CARE MANAGEMENT / SOCIAL WORK IP CONSULT  NURSING TO CONSULT FOR VASCULAR ACCESS CARE IP CONSULT  MEDICATION HISTORY IP PHARMACY CONSULT  NURSING TO CONSULT FOR VASCULAR ACCESS CARE IP CONSULT  NURSING TO CONSULT FOR VASCULAR ACCESS CARE IP CONSULT  OCCUPATIONAL THERAPY ADULT IP CONSULT  OCCUPATIONAL THERAPY ADULT IP CONSULT  [facility] INPATIENT HOSPICE ADULT CONSULT  PHARMACY LIAISON FOR MEDICATION COVERAGE CONSULT    Code Status   No CPR- Do NOT Intubate    Time Spent on this Encounter   I, Yani Callahan PA-C, personally saw the patient today and spent less than or equal to 30 minutes discharging this patient.       Yani Callahan PA-C  [facility] [address] [address]  Phone: [phone]  ______________________________________________________________________    Physical Exam   Vital Signs: Temp: 97.7  F (36.5  C) Temp src: Axillary BP: (!) 162/76 Pulse: 70   Resp: 16 SpO2: 98 % O2 Device: None (Room air)    Weight: 113 lbs 12.8 oz  GENERAL: Alert and awake. Does not answer questions appropriately. Appears comfortable.   HEENT: Icteric sclera.  Dry membranes moist    CARDIOVASCULAR: RRR. S1, S2. No murmurs, rubs, or gallops.   RESPIRATORY: Effort normal on RA.   GI: Abdomen soft, TTP in RUQ with palpable mass. normoactive bowel sounds present  EXTREMITIES: No peripheral edema.  NEUROLOGICAL:  CN II-XII grossly intact. Moving all extremities symmetrically.   SKIN: Intact. Warm and dry. + jaundice.        Primary Care Physician   Gulshan Adam    Discharge Orders   No discharge procedures on file.    Significant Results and Procedures   Most Recent 3 CBC's:Recent Labs   Lab Test 10/28/22  0956 10/25/22  2339 08/17/20  2222   WBC 6.1 6.5 6.6   HGB 10.3* 9.5* 13.9   MCV 99 95 86    320 118*     Most Recent 3 BMP's:Recent Labs   Lab Test 11/06/22  0849 11/06/22  0152 11/05/22  1341 10/28/22  1312 10/28/22  0956 10/27/22  2147 10/27/22  2008 10/26/22  0830 10/25/22  2339 08/17/20  2222   NA  --   --   --   --  137  --   --   --  139 135   POTASSIUM  --   --   --   --  2.9*  --   --   --  3.2* 3.9   CHLORIDE  --   --   --   --  97*  --   --   --  105 105   CO2  --   --   --   --  17*  --   --   --  25 22   BUN  --   --   --   --  11.8  --   --   --  12 15   CR  --   --   --   --  0.69  --  0.59  --  0.63 0.44*   ANIONGAP  --   --   --   --  23*  --   --   --  9 8   LAKESHA  --   --   --   --  9.4  --   --   --  8.4* 9.1   * 144* 138*   < > 136*   < >  --    < > 195* 398*    < > = values in this interval not displayed.     Most Recent 2 LFT's:Recent Labs   Lab Test 10/28/22  0956 10/25/22  2339   AST 72*  --    ALT 23 25   ALKPHOS 428* 448*   BILITOTAL 23.1* 21.0*     Most Recent 3 INR's:Recent Labs   Lab Test 10/28/22  0956 10/25/22  2339   INR 4.48* 2.42*     7-Day Micro Results     Collected Updated Procedure Result Status      11/03/2022 1741 11/05/2022 0932 Urine Culture [[id number]]    Urine, Clean Catch    Final result Component Value   Culture >100,000 CFU/mL Mixture of urogenital yehuda                   Most Recent Urinalysis:Recent Labs   Lab Test 11/03/22  1744    COLOR Orange*   APPEARANCE Slightly Cloudy*   URINEGLC 100*   URINEBILI Large*   URINEKETONE Negative   SG 1.032   UBLD Moderate*   URINEPH 6.5   PROTEIN 300*   NITRITE Negative   LEUKEST Moderate*   RBCU 90*   WBCU 39*   ,   Results for orders placed or performed during the hospital encounter of 10/25/22   US Lower Extremity Venous Duplex Bilateral    Narrative    EXAM: US LOWER EXTREMITY VENOUS DUPLEX BILATERAL  LOCATION: [facility]  DATE/TIME: 10/26/2022 1:17 AM    INDICATION: Bilateral lower extremity soft tissue edema.  COMPARISON: None.  TECHNIQUE: Venous Duplex ultrasound of bilateral lower extremities with and without compression, augmentation and duplex. Color flow and spectral Doppler with waveform analysis performed.    FINDINGS: Exam includes the common femoral, femoral, popliteal veins as well as segmentally visualized deep calf veins and greater saphenous vein.     RIGHT: No deep vein thrombosis. No superficial thrombophlebitis. No popliteal cyst.    LEFT: No deep vein thrombosis. No superficial thrombophlebitis. No popliteal cyst.    Bilateral lower extremity subcutaneous edema.      Impression    IMPRESSION:  1.  No deep venous thrombosis in the bilateral lower extremities.    2.  Bilateral lower extremity subcutaneous edema.   POC US ABDOMEN LIMITED    Impression    This ultrasound was not done   US Abdomen Limited    Narrative    EXAM: US ABDOMEN LIMITED  LOCATION: [facility]  DATE/TIME: 10/26/2022 12:53 AM    INDICATION: Worsening liver failure.  COMPARISON: None.  TECHNIQUE: Limited abdominal ultrasound.    FINDINGS:    GALLBLADDER: Surgically absent.    BILE DUCTS: Intrahepatic and extrahepatic biliary dilatation. The common duct measures 17 mm.    LIVER: Mild coarsened hepatic echotexture. No surface contour nodularity. Patent portal vein.    RIGHT KIDNEY: No hydronephrosis. 10.2 cm in  length.    PANCREAS: The visualized portions are normal.    Minimal ascites.      Impression    IMPRESSION:  1.  Mild coarsened hepatic echotexture which can be seen with chronic underlying hepatic parenchymal disease. Recommend correlation with liver function studies.    2.  Patent portal vein.    3.  Intrahepatic and extrahepatic biliary dilatation. Recommend correlation for biliary obstruction consideration of MRCP to further evaluate the common bile duct and the pancreatic head.    4.  Minimal ascites surrounding the liver and right upper quadrant.       Chest XR,  PA & LAT    Narrative    EXAM: XR CHEST 2 VIEWS  LOCATION: [facility]  DATE/TIME: 10/26/2022 3:40 AM    INDICATION: cough  COMPARISON: Chest radiograph 08/17/2020      Impression    IMPRESSION: Relatively low lung volumes which exaggerates the pulmonary vasculature. The heart size is normal. There are linear opacities of the left midlung favored to represent subsegmental atelectasis. Patchy opacity at the left lower lung which could   represent additional atelectasis however infectious infiltrate is also a consideration and attention on follow-up is recommended. There are atherosclerotic calcifications of the aortic arch. No acute osseous abnormality.   CT Abdomen Pelvis w/o Contrast    Narrative    EXAMINATION: CT ABDOMEN PELVIS W/O CONTRAST, 10/28/2022 4:35 PM    TECHNIQUE:  Helical CT images from the lung bases through the  symphysis pubis were obtained without IV contrast. Contrast dose:  none. Contrast was not administered as patient declined IV contrast.    COMPARISON: The report from a CT chest abdomen and pelvis 7/25/2022 is  reviewed however images are not available for direct comparison.  Ultrasound abdomen 10/26/2022.    HISTORY: biliary obstruction r/o    FINDINGS: Exam is slightly limited by respiratory motion    Liver: Prominent left lobe of the liver. No visualized hepatic mass on  this  noncontrast exam. Mildly irregular surface contours.  Biliary system: Cholecystectomy.. There is significant intrahepatic  biliary dilatation most prominent within the left lobe of the liver as  well as segments 1, 5 and 8 and to a lesser degree in segment 6 and 7  leading up to the extrahepatic biliary system. Common duct beyond this  is difficult to identify on this noncontrast CT, but  does not appear  significantly distended in the region of the pancreatic head/ampulla.  Pancreas: Normal noncontrast appearance of the pancreatic parenchyma,  no pancreatic ductal dilatation.  Stomach: Within normal limits.  Spleen: Within normal limits.  Adrenal glands: Within normal limits.  Kidneys: No focal mass, hydronephrosis, or stone.  Bladder: Within normal limits.  Reproductive organs: Calcified uterine fibroids, surgical material in  the low pelvis likely related to prior tubal ligation.  Colon: Diverticular disease of the sigmoid colon with a mild amount of  pericolonic fat stranding and mild wall thickening.  Appendix: Not definitely visualized.  Small Bowel: Normal in caliber  Lymph nodes: No intra-abdominal or pelvic lymphadenopathy.  Vasculature: . A bandlike atherosclerosis, normal caliber of the  abdominal aorta.  Peritoneum: Small intra-abdominal ascites. No intraperitoneal free  air.     Lung bases: Small amount of basilar and right middle lobe atelectasis.  Coronary artery calcification.    Bones and soft tissues: No suspicious soft tissue mass or fluid  collection. No suspicious osseous lesion. Cranial migration of the  left total hip arthroplasty acetabular cup. Heterotopic ossification.      Impression    IMPRESSION:   1. Prominent intrahepatic biliary dilatation leading up to the  confluence of the central and left bile ducts and to a lesser degree  the right bile duct which is suspicious for obstructing tumor such as  the patient's known history of locally advanced gallbladder carcinoma.  This appears to  be new from CT report 7/25/2022. Images not available  for direct comparison. If the patient is agreeable to an intravenous  contrast study, recommend follow-up CT or MRI/MRCP study.  2. Mild perihepatic and low pelvic ascites.  3. Concern for cranial rotation of the left total hip arthroplasty  acetabular cup, comparison with outside exams if they can be obtained  would be helpful to determine chronicity   4. Small amount of fat stranding about the sigmoid colon with possible  wall thickening which may be related to generalized mesenteric edema  or superimposed uncomplicated diverticulitis. Correlation with any GI  symptoms are focal tenderness in this region.  5. Limited evaluation for metastatic disease on noncontrast exam which  can be reevaluated on any follow-up contrast study.    I have personally reviewed the examination and initial interpretation  and I agree with the findings.    DAVID LUCAS MD         SYSTEM ID:  [id number]       Discharge Medications   Current Discharge Medication List      CONTINUE these medications which have NOT CHANGED    Details   carvedilol (COREG) 25 MG tablet Take 25 mg by mouth 2 times daily (with meals)      cholecalciferol (VITAMIN D3) 125 mcg (5000 units) capsule Take by mouth daily      famotidine (PEPCID) 20 MG tablet Take 20 mg by mouth 2 times daily      furosemide (LASIX) 20 MG tablet Take 20 mg by mouth daily      !! gabapentin (NEURONTIN) 100 MG capsule Take 200 mg by mouth 4 times daily      !! gabapentin (NEURONTIN) 100 MG capsule Take 100 mg by mouth every 4 hours as needed for other (anxiety, pain, restlessness)      HYDROcodone-acetaminophen (NORCO) 5-325 MG tablet Take 1-2 tablets by mouth every 4 hours as needed for severe pain      hyoscyamine (LEVSIN/SL) 0.125 MG sublingual tablet Place 0.125 mg under the tongue every 4 hours as needed (excessive oral secretions)      insulin glargine (LANTUS PEN) 100 UNIT/ML pen Inject 35 Units Subcutaneous At Bedtime       insulin lispro (HUMALOG KWIKPEN) 100 UNIT/ML (1 unit dial) KWIKPEN Inject Subcutaneous 3 times daily (before meals) 0-150: none  151-250: 4 units  251-350: 6 units  351-450: 8 units  451+: 10 units and call doctor if not coming down within 2 hours      loperamide (IMODIUM) 2 MG capsule Take 4 mg by mouth every 4 hours as needed for diarrhea      LORazepam (ATIVAN) 0.5 MG tablet Take 0.5 mg by mouth every 4 hours as needed for anxiety or agitation      mirtazapine (REMERON) 15 MG tablet Take 15 mg by mouth At Bedtime      OLANZapine (ZYPREXA) 10 MG tablet Take 5 mg by mouth 2 times daily      ondansetron (ZOFRAN) 4 MG tablet Take 4 mg by mouth every 8 hours as needed for nausea      QUEtiapine (SEROQUEL) 100 MG tablet Take 100 mg by mouth At Bedtime      rosuvastatin (CRESTOR) 5 MG tablet Take 5 mg by mouth At Bedtime      senna-docusate (SENOKOT-S/PERICOLACE) 8.6-50 MG tablet Take 1 tablet by mouth daily Hold for loose stools      traZODone (DESYREL) 50 MG tablet Take 50 mg by mouth At Bedtime       !! - Potential duplicate medications found. Please discuss with provider.        Allergies   Allergies   Allergen Reactions     Sulfur

## 2022-11-08 NOTE — PROGRESS NOTES
Maria Guadalupe is lying supine in her hospital bed. The room is darkened with only the television for light.  Maria Guadalupe stirs her feet a bit, otherwise does not arouse to presence, soft voice or light touch.  Discussed POC with her Nurse Real. He reports Haldol at about 130p this afternoon. Encouraged 1-2mg 3-4x/day. A 8mg/day scheduled, a schedule which has helped with patients is divided doses 1mg bid and 3mg hs. Or continue current schedule and add Valium at hs if insomnia is uncomfortable.    Current POC appears to meeting Maria Guadalupe's needs at this time in her life.    Bernard LOJA RN Clinical Liaison - [facility]    Office:   [phone]    Cell: [phone]

## 2022-11-08 NOTE — PROGRESS NOTES
SW met with patient. Eval completed,with info provided from Granddaughter Flip.  Flip does respond to text messages #[phone].  JOSE MIGUEL waiting updates on families wishes for final arrangements.      MARIJA Tolliver [facility]   [phone]  [facility]

## 2022-11-08 NOTE — PHARMACY-ADMISSION MEDICATION HISTORY
Admission medication history completed at [facility]. Please see Pharmacy - Admission Medication History note from 10/28/2022 by Usha Polanco.    Yani Bianchi, PharmD

## 2022-11-08 NOTE — PLAN OF CARE
Goal Outcome Evaluation:      Plan of Care Reviewed With: patient    Overall Patient Progress: no changeOverall Patient Progress: no change    Outcome Evaluation: Patient had a good night this shift. Morphine given before bed which helped patient sleep for most of the night. Did not give Scheduled haldol overnight 2/2 patient sleeping comfortably. Another dose of morphine given this morning when awoken. Repositioned comfortably. No bm this shift.

## 2022-11-08 NOTE — PROGRESS NOTES
[facility]    Medicine Progress Note - Hospitalist Service, GOLD TEAM 4    Date of Admission:  11/7/2022    Assessment & Plan    Maria Guadalupe Betancourt is a 66 yo F with PMHx of gallbladder cancer, HCV cirrhosis, IDDM2, Hx of CVA, anxiety, cognitive impairment, and schizophrenia, recently initiated on hospice, admitted on 10/25/22 for LE edema and jaundice with family changes in GOC to pursue goal directed therapy. Patient was found to have progression of her gallbladder cancer with intrahepatic and extrahepatic biliary duct dilation concerning for biliary obstruction from cancer and progression of ESLD with decompensation. After multiple discussions and care conferences, family now agreeable to focus on comfort and re-pursue hospice, accepted to [facility] hospice. Discharged and re-admitted to [facility] hospice on 11/7/2022. Please see same day discharge summary for prior hospital course details.     # Comfort cares  - Continue butrans patch 5 mcg/hr  - Roxanol 5-10 mg Q1H PRN pain or dyspnea   - Ativan solution 0.5 mg Q3H PRN pain   - Haldol 2 mg Q6H standing and 1-2 mg Q4H PRN agitation   - oral and eye care  - Reposition as needed for comfort  - Sarna cream as needed for itching   - No lab draws, vitals       Diet: Regular Diet Adult    DVT Prophylaxis: Not indicated as patient on hospice/comfort cares   Hunter Catheter: Not present  Central Lines: None  Cardiac Monitoring: None  Code Status: No CPR- Do NOT Intubate      Disposition Plan       The patient's care was discussed with the Attending Physician, Dr. Narendra Moffett, Bedside Nurse and Patient.    Yani Callahan PA-C  Hospitalist Service, GOLD TEAM 4  [facility]  Securely message with the Vocera Web Console (learn more here)  Text page via [employer] Paging/Directory   Please see signed in provider for up to date coverage information      Clinically Significant Risk Factors Present on  Admission                              ______________________________________________________________________    Interval History   Patient awakes to voice, but does not answer any further questions. Lying in bed comfortably. Unable to obtain further history due to mental status.     Data reviewed today: I reviewed all medications, new labs and imaging results over the last 24 hours.     Physical Exam   Vital Signs:                    Weight: 0 lbs 0 oz  There were no vitals taken for this visit.  GENERAL: Somnolent, but easily awakes to voice. Lying comfortably in bed.   HEENT: Icteric sclera.   RESPIRATORY: Effort normal on RA.  EXTREMITIES: No peripheral edema.   NEUROLOGICAL: Moving all extremities symmetrically.   SKIN: Intact. Warm and dry. + jaundice.     Data   Recent Labs   Lab 11/06/22  0849 11/06/22  0152 11/05/22  1341   * 144* 138*     No results found for this or any previous visit (from the past 24 hour(s)).  Medications       [START ON 11/13/2022] buprenorphine  1 patch Transdermal Weekly     buprenorphine  1 each Transdermal Q8H     [START ON 11/13/2022] buprenorphine  1 each Transdermal Once     haloperidol  2 mg Oral Q6H     pantoprazole  40 mg Oral QAM AC     senna-docusate  1 tablet Oral BID

## 2022-11-09 NOTE — PLAN OF CARE
Goal Outcome Evaluation:      Plan of Care Reviewed With: patient    Overall Patient Progress: no changeOverall Patient Progress: no change    Outcome Evaluation: Oriented to self only. combative, agitated, swinging at staff during med administration. scheduled haldol, PRN ativan and morphine for pain and agitation. liquid BM. sleeping in bed. Bed alarm on, commode at bedside  [facility] hospice care.   Spoke with granddaughter Flip over the phone, update given. No acute changes.     Gave PRN morphine x2, refused x2. Refused ativan x1. Was able to give scheduled haldol.     Ordered food (mashed potatoes) at family's request, pt was uninterested/ did not want to eat.

## 2022-11-09 NOTE — PLAN OF CARE
Reason for Admission: Inpatient hospice    Status: No change    RN assumed cares at 0700    Pain: No evidence of pain; haldol given x2  Neuro: Oriented to self only  Cardiac: No evidence of chest pain  Respiratory: RA, no evidence of SOB  GI/: No BM on shift; voided on BSC x1  IV/Drains: No IV  Activity: Assist x1 - bed alarm on; pt pivoted to BSC x2  Skin: Jaundiced     Plan of Care: Proceed w/comfort cares

## 2022-11-09 NOTE — PROGRESS NOTES
CLINICAL NUTRITION SERVICES    Maria Guadalupe Betancourt is a/an 67 year old female assessed by the dietitian for Admission Nutrition Risk Screen for positive    Informed decision made to change pt s status to comfort care. No nutrition interventions planned at this time. RD can be consulted if needed.    RD signing off on 11/9/2022.     Lupe Zhao RD, , LD  Weekday Pager: [phone]  Weekday Units covered: 7C (all beds) and 5A (beds 5201 through 5211-2)  Weekend/Holiday RD Pager: [phone]

## 2022-11-09 NOTE — PLAN OF CARE
Goal Outcome Evaluation:      Plan of Care Reviewed With: patient    Overall Patient Progress: no changeOverall Patient Progress: no change    Outcome Evaluation: Patient was increasingly agitated overnight. Very difficult with nursing cares and med administration. Patient swinging at staff when trying to asist with clean ups and med administration. Able to give morphine and haldol after multiple attempts which helped calm patient significantly. Patient had bm overnight. Urine output dark, little amount. now sleeping comfortably in bed. continue poc.

## 2022-11-09 NOTE — PROGRESS NOTES
[facility]    Medicine Progress Note - Hospitalist Service, GOLD TEAM 4    Date of Admission:  11/7/2022    Assessment & Plan   Maria Guadalupe Betancourt is a 67 year old female with history of locally-advanced gallbladder cancer, HCV cirrhosis, CVA, DM2, schizophrenia, anxiety, and cognitive impairment who was admitted 10/25/22 with jaundice and progressive LE edema. Previously enrolled in hospice, discharged per family to pursue further treatment. Repeat imaging showed disease progression with intrahepatic and extrahepatic biliary dilation concerning for malignant obstruction. After further goals of care discussions and multiple care conferences, family agreeable to re-enrolling in hospice. Admitted to [facility] on 11/7/22. Please see discharged summary for further details of recent hospital stay.    # Comfort Cares  - Patient appears comfortable today. Increased agitation noted overnight.  - Butrans patch 5 mcg/hr  - Roxanol 5-10mg Q1H PRN  - Ativan solution 0.5mg Q3H PRN   - Haldol 2mg Q6H and 1-2mg Q4H PRN  - Reposition as needed for comfort  - Sarna cream PRN for itching  - No labs or vitals       Diet: Regular Diet Adult    DVT Prophylaxis: Not indicated d/t comfort cares  Hunter Catheter: Not present  Central Lines: None  Cardiac Monitoring: None  Code Status: No CPR- Do NOT Intubate      Disposition Plan      Expected Discharge Date: 11/11/2022    Discharge Delays: Comfort Care/Hospice    Discharge Comments: Patient is [facility] (General Inpatient Hospice).        The patient's care was discussed with the Attending Physician, Dr. Moffett.    Augustine Ly PA-C  Hospitalist Service, GOLD TEAM 4  [facility]  Securely message with the Vocera Web Console (learn more here)  Text page via [facility] Paging/Directory   Please see signed in provider for up to date coverage information      Clinically Significant Risk Factors                                ______________________________________________________________________    Interval History   Patient noted to be agitated overnight, occasionally taking swings at nursing staff. Currently sleeping. Opens eyes to voice but does not respond to questioning. Appears comfortable. Bedside RN reports patient seems more relaxed.     Data reviewed today: I reviewed all medications, new labs and imaging results over the last 24 hours.    Physical Exam   Vital Signs:                    Weight: 0 lbs 0 oz  General Appearance:  RASS -1. Appears comfortable.   HEENT:  No scleral icterus. Dilated pupils.   Respiratory:  Normal effort. CTAB.   Cardiovascular:  RRR.       Data   No labs

## 2022-11-09 NOTE — CONSULTS
[facility]    Progress Note - [facility]    ______________________________________________________________________    [facility] 24/7 Contact Number: [phone]    - Providers: Please contact [facility] with changes in orders or clinical plan of care   - Nursing: Please contact [facility] with significant changes in patient condition  ______________________________________________________________________        Summary of Visit (includes assessment, medications and any new orders):   O2 sats 100%; P: 72; RR: 14. Increased agitation noted through the night. Hospice recommends pre-medicating w/ PRN lorazepam prior to cares, repositioning, etc for patient comfort. No non verbal s/sx of pain present during visit. Discharge plan pending at this time.       Alexandria Villanueva RN  [phone]

## 2022-11-10 NOTE — PLAN OF CARE
Goal Outcome Evaluation:      Plan of Care Reviewed With: patient    Overall Patient Progress: no changeOverall Patient Progress: no change    Outcome Evaluation: Oriented to self only. Calm and sleeping throughout the night. Up SBA to bedside commode. Administered scheduled haldol x1. Bed alarm on.

## 2022-11-10 NOTE — PROGRESS NOTES
[facility]    Medicine Progress Note - Hospitalist Service, GOLD TEAM 4    Date of Admission:  11/7/2022    Assessment & Plan   Maria Guadalupe Betancourt is a 67 year old female with history of locally-advanced gallbladder cancer, HCV cirrhosis, CVA, DM2, schizophrenia, anxiety, and cognitive impairment who was admitted 10/25/22 with jaundice and progressive LE edema. Previously enrolled in hospice, discharged per family to pursue further treatment. Repeat imaging showed disease progression with intrahepatic and extrahepatic biliary dilation concerning for malignant obstruction. After further goals of care discussions and multiple care conferences, family agreeable to re-enrolling in hospice. Admitted to [facility] on 11/7/22. Please see discharged summary for further details of recent hospital stay.    # Comfort Cares  - Patient appears comfortable today.   - Butrans patch 5 mcg/hr  - Roxanol 5-10mg Q1H PRN  - Ativan solution 0.5mg Q3H PRN   - Haldol 2mg Q6H and 1-2mg Q4H PRN  - Reposition as needed for comfort  - Sarna cream PRN for itching  - No labs or vitals       Diet: Regular Diet Adult    DVT Prophylaxis: Not indicated d/t comfort cares  Hunter Catheter: Not present  Central Lines: None  Cardiac Monitoring: None  Code Status: No CPR- Do NOT Intubate      Disposition Plan      Expected Discharge Date: 11/11/2022    Discharge Delays: Comfort Care/Hospice    Discharge Comments: Patient is [facility] (General Inpatient Hospice).        The patient's care was discussed with the Attending Physician, Dr. Moffett.    Augustine Ly PA-C  Hospitalist Service, GOLD TEAM 4  [facility]  Securely message with the Vocera Web Console (learn more here)  Text page via [facility] Paging/Directory   Please see signed in provider for up to date coverage information      Clinically Significant Risk Factors                                ______________________________________________________________________    Interval History   Patient just received haldol and pain meds. Sleeping comfortably. No acute concerns from nursing staff.    Data reviewed today: I reviewed all medications, new labs and imaging results over the last 24 hours.    Physical Exam   Vital Signs:                    Weight: 0 lbs 0 oz  General Appearance:  Appears comfortable.      Data   No labs

## 2022-11-10 NOTE — PLAN OF CARE
Goal Outcome Evaluation:      Plan of Care Reviewed With: patient    Overall Patient Progress: no changeOverall Patient Progress: no change    Outcome Evaluation: oriented to self only. SBA to commode at bedside with walker. able to be redirected. BM today in bed, able to be cleaned up. Sleeping between cares.   Able to give scheduled haldol. Gave PRN morphine x1, PRN ativan x1

## 2022-11-10 NOTE — PROGRESS NOTES
Final Arrangements:  [facility]  [address] [place] [id number]   [phone]    Application for [facility] Burial program has been provided and requested to be completed by Granddamaricruz Castelan.

## 2022-11-11 NOTE — CONSULTS
"[facility]    Progress Note - [facility]    ______________________________________________________________________    [facility] 24/7 Contact Number: [phone]    - Providers: Please contact [facility] with changes in orders or clinical plan of care   - Nursing: Please contact [facility] with significant changes in patient condition  ______________________________________________________________________        Plan of Care Discussed with the Following:   - Nurse: Marcellus Lerma's Family/[facility] Contact[facility]  - Hospice Provider: Dr. Usama Loco    Summary of Visit (includes assessment, medications and any new orders):   Met with pt at bedside.  Pt is completely covered in a blanket.  Responded angrily with, \"Don't touch me.\".  Did not verbally answer if having  pain.  Please monitor for nonverbal signs of pain/anxiety and utilize PRN's for EOL comfort.    Ala RN reports increased daytime sleepiness/lethargy and poor PO intake which are normal and to be expected EOL signs at this time.      Left VM for granddaughter, Flip, to call writer back.  Will educate on discontinuing senna and protonix at this time.  Will update provider once contact with Flip is made.      Please call cell listed below with any questions/concerns.         JASIEL Yeager, RN  Clinical Nurse Liaison I [facility] I [facility]  Cell: [phone]  [facility] Hospice & Palliative Care [facility]  Office: [phone]  Email [email or web address]    [email or web address]  "

## 2022-11-11 NOTE — PLAN OF CARE
Goal Outcome Evaluation:    8970-1985    Continue on hospice care. Bed alarm in place, tried to get up from bed twice. Assisted to bathroom by this RN, unsteady for transfer. Incontinent of bowel and bladder once. Family at bedside and discussed care plan early in the afternoon. Morphine 10 mg oral solution q1h, given 3 times, Ativan oral solution once, scheduled Haldol 2 mg oral solution. Then patient slept.  Incontinent of bowel and bladder. Family assisted patient with feeding.

## 2022-11-11 NOTE — PLAN OF CARE
Goal Outcome Evaluation:      Plan of Care Reviewed With: patient    Overall Patient Progress: no changeOverall Patient Progress: no change    Outcome Evaluation: Pt oriented to self only. Calm and sleeping throughout the night. Administered scheduled haldol x1. Incontinent of bowel and bladder. Bed alarm on.

## 2022-11-11 NOTE — PROGRESS NOTES
[facility]    Medicine Progress Note - Hospitalist Service, GOLD TEAM 4    Date of Admission:  11/7/2022    Assessment & Plan   Maria Guadalupe Betancourt is a 67 year old female with history of locally-advanced gallbladder cancer, HCV cirrhosis, CVA, DM2, schizophrenia, anxiety, and cognitive impairment who was admitted 10/25/22 with jaundice and progressive LE edema. Previously enrolled in hospice, discharged per family to pursue further treatment. Repeat imaging showed disease progression with intrahepatic and extrahepatic biliary dilation concerning for malignant obstruction. After further goals of care discussions and multiple care conferences, family agreeable to re-enrolling in hospice. Admitted to [facility] on 11/7/22. Please see discharged summary for further details of recent hospital stay.    # Comfort Cares  - Patient appears comfortable today.   - Butrans patch 5 mcg/hr  - Roxanol 5-10mg Q1H PRN  - Ativan solution 0.5mg Q3H PRN   - Haldol 2mg Q6H and 1-2mg Q4H PRN  - Reposition as needed for comfort  - Sarna cream PRN for itching  - No labs or vitals       Diet: Regular Diet Adult    DVT Prophylaxis: Not indicated d/t comfort cares  Hunter Catheter: Not present  Central Lines: None  Cardiac Monitoring: None  Code Status: No CPR- Do NOT Intubate      Disposition Plan      Expected Discharge Date: 11/12/2022    Discharge Delays: Comfort Care/Hospice    Discharge Comments: Patient is [facility] (General Inpatient Hospice).        The patient's care was discussed with the Attending Physician, Dr. Moffett.    Augustine Ly PA-C  Hospitalist Service, GOLD TEAM 4  [facility]  Securely message with the Vocera Web Console (learn more here)  Text page via [facility] Paging/Directory   Please see signed in provider for up to date coverage information      Clinically Significant Risk Factors                                ______________________________________________________________________    Interval History   No acute events overnight per nursing staff. Patient lying in bed, completely covered head-to-toe with blanket. Does not want to be bothered. Reports feeling comfortable. No acute concerns.     Data reviewed today: I reviewed all medications, new labs and imaging results over the last 24 hours.    Physical Exam   Vital Signs:                    Weight: 0 lbs 0 oz  General Appearance:  Appears comfortable. Exam declined.     Data   No labs

## 2022-11-11 NOTE — PROGRESS NOTES
SW met with patient bedside.  Patient is resting comfortably with no signs of agitation.  Discharge referrals have been made to [facility], [facility] & [facility].    MARIJA Tolliver [facility]   [phone]  [facility]

## 2022-11-11 NOTE — PLAN OF CARE
Goal Outcome Evaluation:    Lethargic majority of shift. Poor appetite. Pt had an episode of bladder and bowel incontinence, linen and gown changed.  Pt refused ativan. Scheduled haldol administered at 1256.    Hospice RN stopped by at 1100. Encouraged importance of haldol administration as scheduled regardless if pt resting or not. Recommended use of ativan if neccessary.    Family visited around 1430. Family requests pt to wear briefs at all times.

## 2022-11-12 NOTE — CONSULTS
[facility]    Progress Note - [facility]    ______________________________________________________________________    [facility] 24/7 Contact Number: [phone]    - Providers: Please contact [facility] with changes in orders or clinical plan of care   - Nursing: Please contact [facility] with significant changes in patient condition  ______________________________________________________________________        Plan of Care Discussed with the Following:   - Nurse: Aisha  - Hospitalist/Rounding Provider: Augustine LEIVA  - Maria Guadalupe's Family/Preferred Contact: Left  for Flip, spoke to Gregor lundberg  - Hospice Provider: Dr. Usama Loco    Summary of Visit (includes assessment, medications and any new orders):     Reviewed PRN doses of Roxanol in last 24H.  Spoke to pt's Gregor lundberg today and Flip yesterday regarding goals of comfort.  Both are in agreement to schedule Roxanol for end of life pain management AEB restlessness, agitation, verbally indicated pain last evening.    Hospice medications recommendations:    Discontinue senna and protonix  Schedule roxanol 10mg SL q4H  Roxanol 5-10mg SL Q2H PRN for breakthough pain     Emotional support and education on medications being utilized provided to Gregor via PC.  Gregor plans on being present later today.    Please call cell phone listed below until 9PM with any questions/concerns.    JASIEL Yeager, RN  Clinical Nurse Liaison I [facility] I [facility]  Cell: [phone]  [facility] Hospice & Palliative Care [facility]  Office: [phone]  Email [email or web address]    [email or web address]

## 2022-11-12 NOTE — CONSULTS
"[facility]    Progress Note - [facility]    ______________________________________________________________________    [facility] 24/7 Contact Number: [phone]    - Providers: Please contact [facility] with changes in orders or clinical plan of care   - Nursing: Please contact [facility] with significant changes in patient condition  ______________________________________________________________________        Plan of Care Discussed with the Following:   - Nurse: Aldair    Lengthy visit with nephew,Ramses and niece, Gregor providing education on end of life signs/medications, emotional and grief support.      Spoke to granddaughterFlip via phone.  She has 2 sick children at home and not able to come in today.    Pt experiencing pain/agitation at time of assessment.  Hollering out.  Verbalized pain when asked but not able to describe where.  Restless, begging writer to \"help me, please.\"  Aldair called to bedside to administer PRN ativan and scheduled haldol SL. Pt received PRN MS 10 mg SL 20 minutes earlier.       Able to take sips of ginger ale without difficulty swallowing.    Please use PRN lorazepam/MS for end of life comfort/symptom management.          JASIEL Yeager, RN  Clinical Nurse Liaison I [facility]  Cell: [phone]  [facility] Hospice & Palliative Care [facility]  Office: [phone]  Email [email or web address]    [email or web address]  "

## 2022-11-12 NOTE — PROGRESS NOTES
[facility]    Medicine Progress Note - Hospitalist Service, GOLD TEAM 4    Date of Admission:  11/7/2022    Assessment & Plan   Maria Guadalupe Betancourt is a 67 year old female with history of locally-advanced gallbladder cancer, HCV cirrhosis, CVA, DM2, schizophrenia, anxiety, and cognitive impairment who was admitted 10/25/22 with jaundice and progressive LE edema. Previously enrolled in hospice, discharged per family to pursue further treatment. Repeat imaging showed disease progression with intrahepatic and extrahepatic biliary dilation concerning for malignant obstruction. After further goals of care discussions and multiple care conferences, family agreeable to re-enrolling in hospice. Admitted to [facility] on 11/7/22. Please see discharged summary for further details of recent hospital stay.    # Comfort Cares  Patient appears comfortable today but noted to have increased agitation overnight. Per hospice team, could be sign of uncontrolled pain. Recommend scheduling morphine for better pain control.   - Butrans patch 5 mcg/hr  - Schedule Roxanol 10mg Q4H + 5-10mg Q2H PRN  - Ativan solution 0.5mg Q3H PRN   - Haldol 2mg Q6H and 1-2mg Q4H PRN  - Reposition as needed for comfort  - Sarna cream PRN for itching  - No labs or vitals       Diet: Regular Diet Adult    DVT Prophylaxis: Not indicated d/t comfort cares  Hunter Catheter: Not present  Central Lines: None  Cardiac Monitoring: None  Code Status: No CPR- Do NOT Intubate      Disposition Plan      Expected Discharge Date: 11/14/2022    Discharge Delays: Comfort Care/Hospice    Discharge Comments: Patient is [facility] (General Inpatient Hospice).        The patient's care was discussed with the Attending Physician, Dr. Moffett, Bedside Nurse and Hospice Team.    Augustine Ly PA-C  Hospitalist Service, GOLD TEAM 4  [facility]  Securely message with the Vocera Web Console (learn more  here)  Text page via [facility] Paging/Directory   Please see signed in provider for up to date coverage information      Clinically Significant Risk Factors                               ______________________________________________________________________    Interval History   Increased agitation overnight. Seems better after pain meds. Hospice team at bedside, questioning whether agitation may be sign of uncontrolled pain.     Data reviewed today: I reviewed all medications, new labs and imaging results over the last 24 hours.    Physical Exam   Vital Signs:                    Weight: 0 lbs 0 oz  General Appearance:  Appears comfortable.      Data   No labs

## 2022-11-12 NOTE — PLAN OF CARE
[id number]  Pt on hospice cares. Given scheduled Haldol- No agitation this shift. Morphine order changed to scheduled q 4hrs, w/ PRN available as well. Seems to be effective as pts pain score went down throughout the day. Pt does try to crawl out of bed when she has been Incontinent of bowel x3 and bladder x2. Linens changed. Pt resting comfortably. Bed alarm on.

## 2022-11-12 NOTE — PLAN OF CARE
Goal Outcome Evaluation:           Overall Patient Progress: no changeOverall Patient Progress: no change    Outcome Evaluation: Pt alert to self/name, d/o to time, situation and place. Bed alarm on for safety. Sleeping most of the night, denying pain. Gave one dose of PRN morphine for restlessness and breathing along with scheduled Haldol. Urinated once overnight. Refused oral cares but continuing to keep patient comfortable.

## 2022-11-13 NOTE — PROGRESS NOTES
[facility]    Medicine Progress Note - Hospitalist Service, GOLD TEAM 4    Date of Admission:  11/7/2022    Assessment & Plan   Maria Guadalupe Betancourt is a 67 year old female with history of locally-advanced gallbladder cancer, HCV cirrhosis, CVA, DM2, schizophrenia, anxiety, and cognitive impairment who was admitted 10/25/22 with jaundice and progressive LE edema. Previously enrolled in hospice, discharged per family to pursue further treatment. Repeat imaging showed disease progression with intrahepatic and extrahepatic biliary dilation concerning for malignant obstruction. After further goals of care discussions and multiple care conferences, family agreeable to re-enrolling in hospice. Admitted to [facility] on 11/7/22. Please see discharged summary for further details of recent hospital stay.    # Comfort Cares - Patient appears comfortable today.   - Butrans patch 5 mcg/hr  - Roxanol 10mg Q4H + 5-10mg Q2H PRN  - Ativan solution 0.5mg Q3H PRN   - Haldol 2mg Q6H and 1-2mg Q4H PRN  - Reposition as needed for comfort  - Sarna cream PRN for itching  - No labs or vitals       Diet: Regular Diet Adult    DVT Prophylaxis: Not indicated d/t comfort cares  Hunter Catheter: Not present  Central Lines: None  Cardiac Monitoring: None  Code Status: No CPR- Do NOT Intubate      Disposition Plan     Expected Discharge Date: 11/14/2022    Discharge Delays: Comfort Care/Hospice    Discharge Comments: Patient is [facility] (General Inpatient Hospice).        The patient's care was discussed with the Attending Physician, Dr. Moffett, Bedside Nurse and Hospice Team.    Augustine Ly PA-C  Hospitalist Service, GOLD TEAM 4  [facility]  Securely message with the Vocera Web Console (learn more here)  Text page via [employer] Paging/Directory   Please see signed in provider for up to date coverage information      Clinically Significant Risk Factors                                ______________________________________________________________________    Interval History   Awake and sitting up in bed, however not responding to questioning. Appears a bit disoriented but overall comfortable. No restlessness or agitation.    Data reviewed today: I reviewed all medications, new labs and imaging results over the last 24 hours.    Physical Exam   Vital Signs:                    Weight: 0 lbs 0 oz  General Appearance:  Appears comfortable.      Data   No labs

## 2022-11-13 NOTE — CONSULTS
[facility]    Progress Note - [facility] Inpatient [facility]osp[facility]    ______________________________________________________________________    [facility] Contact Number: [phone]    - Providers: Please contact [facility] with changes in orders or clinical plan of care   - Nursing: Please contact [facility] with significant changes in patient condition  ______________________________________________________________________        Plan of Care Discussed with the Following:   - Nurse: Alfonso Lerma's Family/Preferred Contact: Nery, sister, here to visit    Summary of Visit (includes assessment, medications and any new orders):     Pt resting with eyes closed.  RR =16 even and unlabored.  No nonverbal signs of agitation, pain at the time of assessment.    Provided emotional/grief support to family that is present, along with end of life signs.    Please call cell below with any questions/concerns.       JASIEL Yeager, RN  Clinical Nurse Liaison I [facility] I [facility]  Cell: [phone]  [facility] Hospice & Palliative Care [facility]  Office: [phone]  Email [email or web address]    [email or web address]

## 2022-11-13 NOTE — PLAN OF CARE
Goal Outcome Evaluation:      Plan of Care Reviewed With: patient    Overall Patient Progress: decliningOverall Patient Progress: declining    Outcome Evaluation: Pt understands but is not speaking. Rec'd all sched Q4 morphine, Q6 haldol per hospice nurses suggestion. 1x small loose BM, incont. Pt found 3x with leg/legs up on siderails attempting to get up. Bed alarm on for safety.

## 2022-11-14 NOTE — PLAN OF CARE
Goal Outcome Evaluation:      Plan of Care Reviewed With: patient, family          Outcome Evaluation: Pt alert, sleeps between cares, understand and responds with yes/no, appears comortable with no nonverbal pain signs, jaundiced, one episode of bowel and urine incontinence, seen cratching self, cruran lotion applied, hospice nurse and primary team member discussed plan of care with writer, hospice encourages staff to keep offering scheduled and prn morphine and haldol even when pt is sleeping, lots of family members visited, family refuses patient to be given morpine and haldol, educated by writer with no understanding, writer called hospice nurse and requestedher to come and talk to family oresent at bedside, hospice nurse answered family questions and educated them but wsn't effective, hospice nurse tried contacting granddaughter who is the decision maker for Maria Guadalupe but couldn't get hold of her, family had a disagreement and security was involved, family insisted refusing meds even after education, hospice nurse and  to f/u with grand daughter tomorrow on who should be allowed to visit and get patient mdeical info if any. after visiting hrs, writer followed orders and pt received her meds per hospice orders.

## 2022-11-14 NOTE — PROGRESS NOTES
Patient is comfortable no s/s of pain noted,scheduled MS and haldol given as per scheduled.Patient incont with bladder and bowel.Patient is repositioned q 2hrs no skin issues.

## 2022-11-14 NOTE — PROGRESS NOTES
[facility]    Medicine Progress Note - Hospitalist Service, GOLD TEAM 4    Date of Admission:  11/7/2022    Assessment & Plan      Maria Guadalupe Betancourt is a 67 year old female with history of locally-advanced gallbladder cancer, HCV cirrhosis, CVA, DM2, schizophrenia, anxiety, and cognitive impairment who was admitted 10/25/22 with jaundice and progressive LE edema. Previously enrolled in hospice, discharged per family to pursue further treatment. Repeat imaging showed disease progression with intrahepatic and extrahepatic biliary dilation concerning for malignant obstruction. After further goals of care discussions and multiple care conferences, family agreeable to re-enrolling in hospice. Admitted to [facility] on 11/7/22. Please see discharged summary for further details of recent hospital stay.     Comfort Cares   Patient appears comfortable today, but not interactive, more lethargic.   - Butrans patch 5 mcg/hr  - Roxanol 10mg Q4H + 5-10mg Q2H PRN  - Ativan solution 0.5mg Q3H PRN   - Haldol 2mg Q6H and 1-2mg Q4H PRN  - Reposition as needed for comfort  - Sarna cream PRN for itching  - No labs or vitals       Diet: Regular Diet Adult    DVT Prophylaxis: Not indicated d/t comfort cares as above  Hunter Catheter: Not present  Central Lines: None  Cardiac Monitoring: None  Code Status: No CPR- Do NOT Intubate      Disposition Plan     Expected Discharge Date: 11/14/2022    Discharge Delays: Comfort Care/Hospice    Discharge Comments: Patient is [facility] (General Inpatient Hospice).        The patient's care was discussed with the Attending Physician, Dr. Narendra Moffett, patient.     Onel Villanueva PA-C  Hospitalist Service, GOLD TEAM 4  [facility]  Securely message with the Vocera Web Console (learn more here)  Text page via [employer] Paging/Directory   Please see signed in provider for up to date coverage information      Clinically Significant  Risk Factors                               ______________________________________________________________________    Interval History   Today, patient seen in room. Not responding to questioning. Not interacting. Appears comfortable.    Data reviewed today: I reviewed all medications, new labs and imaging results over the last 24 hours. I personally reviewed no images or EKG's today.    Physical Exam   Vital Signs:             SpO2: 95 %      Weight: 0 lbs 0 oz  General: Lying in bed, appears comfortable.  Skin: Profoundly jaundiced.   Remainder of exam deferred d/t patient's CC status.    Data   No lab results found in last 7 days.    No results found for this or any previous visit (from the past 24 hour(s)).  Medications       buprenorphine  1 patch Transdermal Weekly     buprenorphine  1 each Transdermal Q8H     haloperidol  2 mg Oral Q6H     morphine sulfate  10 mg Oral Q4H     pantoprazole  40 mg Oral QAM AC     senna-docusate  1 tablet Oral BID

## 2022-11-14 NOTE — CONSULTS
"[facility]    Progress Note - [facility]    ______________________________________________________________________    [facility] Contact Number: [phone]    - Providers: Please contact [facility] with changes in orders or clinical plan of care   - Nursing: Please contact [facility] with significant changes in patient condition  ______________________________________________________________________        Plan of Care Discussed with the Following:   - Nurse: Marni Lerma's Family/Preferred Contact: Flip lengthy conversation via PC      Summary of Visit (includes assessment, medications and any new orders):   Met with pt at bedside.  Eyes open, alert, non verbal, not answering questions.  RR -16 even and unlabored.  No nonverbal signs of pain/dyspnea or agitation at time of assessment.     Collaborated and reviewed current POC with hospital RNMarni.  Lengthy PC to Flip who has had 4 sick children.  Flip endorses current medication plan for end of life comfort/symptom management of agitation and pain.  She is also in agreement with referral to [facility].  Refer to  Hospice LGSW regarding status as pending.    Provided emotional/grief support.  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  Lalohugo apologized for the families strife/outbursts indicating \"long standing differences\".     Flip is the HCA and remains our point .  There was a recent death last week of a 38 year old family member due to drug overdose and feels family is \"not dealing well with the loss\".   She is hoping to make a visit tomorrow.    Please call cell listed below with any questions until 9pm       JASIEL Yeager, RN  Clinical Nurse Liaison I [facility] I [facility]  Cell: [phone]  [facility] Hospice & Palliative Care [facility]  Office: [phone]  Email [email or web address]  "   [email or web address]

## 2022-11-14 NOTE — PROGRESS NOTES
[facility]    Social Work Progress Note - [facility]    ______________________________________________________________________    [facility] Contact Number: [phone]    - Providers: Please contact [facility] with changes in orders or clinical plan of care   - Nursing: Please contact [facility] with significant changes in patient condition  - Social Work: Please contact [facility] for discharge phanning/updates  ______________________________________________________________________      Due to family dynamic issues this weekend regarding medication and disagreements, guerrero was called. Pt's sister would like to be the alternate HCA for Maria Guadalupe. However, please reference the HCA documentation already in Epic. Should Vanessa not be able to or unwilling to act as HCA for Maria Guadalupe, the alternate assigned is her first cousin Grace. Writer placed calls to both relatives to touch base about visiting policies and possible discharge plan. Both relatives did not answer and writer left message for both. [facility] aware.    1025: writer was able to speak with HCA Vanessa who would like her mother, Keila, prohibited from seeing pt without her (vanessa present). Security was notified. Michael will be visiting pt in about an hour. Writer called [facility] and left vm regarding status of application.     Plan of Care Discussed with the Following:   - Nurse: Garcia   - Hospitalist/Rounding Provider:GISELA Hussein  - Maria Guadalupe's Family/Preferred Contact: Vanessa (granddaughter)  - Hospice Provider: Dr Usama Villanueva, [facility]  [phone]

## 2022-11-14 NOTE — PLAN OF CARE
Goal Outcome Evaluation:       Pt has been very drowsy, slept on/off most of the day shift. This writer was not able to assess mental status since pt has not been speaking but appeared comfortable in bed, barely able to keep eyes open. Hospice nurse contacted re pt's drowsiness with current regimen. Pt took scheduled med well but barely ate any food ordered, for both breakfast and lunch. [employer]. Son (Joseph) updated of pt's condition.

## 2022-11-14 NOTE — CONSULTS
"[facility]    Progress Note - [facility] Inpatient [facility]osp[facility]    ______________________________________________________________________    [facility] Contact Number: [phone]    - Providers: Please contact [facility] with changes in orders or clinical plan of care   - Nursing: Please contact [facility] with significant changes in patient condition  ______________________________________________________________________        Plan of Care Discussed with the Following:   - Nurse: Alfonso    Summary of Visit (includes assessment, medications and any new orders):     Note:  Called to pt's room by hospital RNAlfonso.  Pt has large extended family that have not received any information on pt's condition or hospice enrollment but are now visiting in large numbers. Oldest granddaughter, Lilli, verbalized that pt has been estranged from family due to her drug addiction/life style so family members had pt \"blocked from\" reaching them via phone.     Provided emotional/grief support/end of life education to numerous family members.  It has been verbalized that there have been several recent deaths in family due to drug abuse/overdose which is contributing factor to cummulative grief.    Very dysfunctional family dynamics which can escalate easily.  Suggest limiting visitors due to family tension.      PC x2 to Derisha and left VM's/waiting return call.  Will reach out again tomorrow.      JASIEL Yeager, RN  Clinical Nurse Liaison I [facility] I [facility]  Cell: [phone]  [facility] Hospice & Palliative Care [facility]  Office: [phone]  Email [email or web address]    [email or web address]  "

## 2022-11-15 NOTE — PROGRESS NOTES
[facility]    Medicine Progress Note - Hospitalist Service, GOLD TEAM 4    Date of Admission:  11/7/2022    Assessment & Plan      Maria Gudaalupe Betancourt is a 67 year old female with history of locally-advanced gallbladder cancer, HCV cirrhosis, CVA, DM2, schizophrenia, anxiety, and cognitive impairment who was admitted 10/25/22 with jaundice and progressive LE edema. Previously enrolled in hospice, discharged per family to pursue further treatment. Repeat imaging showed disease progression with intrahepatic and extrahepatic biliary dilation concerning for malignant obstruction. After further goals of care discussions and multiple care conferences, family agreeable to re-enrolling in hospice. Admitted to [facility] on 11/7/22. Please see discharged summary for further details of recent hospital stay.     Comfort Cares   Patient appears comfortable today, but not interactive, more lethargic.   - Butrans patch 5 mcg/hr  - Roxanol 10mg Q4H + 5-10mg Q2H PRN  - Ativan solution 0.5mg Q3H PRN   - Haldol 2mg Q6H and 1-2mg Q4H PRN  - Reposition as needed for comfort  - Sarna cream PRN for itching  - No labs or vitals       Diet: Regular Diet Adult    DVT Prophylaxis: Not indicated d/t comfort cares as above  Hunter Catheter: Not present  Central Lines: None  Cardiac Monitoring: None  Code Status: No CPR- Do NOT Intubate      Disposition Plan         The patient's care was discussed with the Attending Physician, Dr. Reginald Nogueira, patient.     Onel Villanueva PA-C  Hospitalist Service, GOLD TEAM 4  [facility]  Securely message with the Vocera Web Console (learn more here)  Text page via [employer] [facility] Paging/Directory   Please see signed in provider for up to date coverage information      Clinically Significant Risk Factors                               ______________________________________________________________________    Interval History   Today,  patient seen in room. Not responding to questioning. Not interacting. Appears comfortable.    Data reviewed today: I reviewed all medications, new labs and imaging results over the last 24 hours. I personally reviewed no images or EKG's today.    Physical Exam   Vital Signs:                    Weight: 0 lbs 0 oz  General: Lying in bed, appears comfortable.  Skin: Profoundly jaundiced.   Remainder of exam deferred d/t patient's CC status.    Data   No lab results found in last 7 days.    No results found for this or any previous visit (from the past 24 hour(s)).  Medications       buprenorphine  1 patch Transdermal Weekly     buprenorphine  1 each Transdermal Q8H     haloperidol  2 mg Oral Q6H     morphine sulfate  10 mg Oral Q4H     pantoprazole  40 mg Oral QAM AC     senna-docusate  1 tablet Oral BID

## 2022-11-15 NOTE — PLAN OF CARE
Goal Outcome Evaluation:      Plan of Care Reviewed With: patient    Overall Patient Progress: no change    Outcome Evaluation: Pt alert, sleeps on and off. Scheduled morphine given. Pt appears comfortable. Incontinent of urine x2. 2 smear BMs and 1 small BM. Ate a little of breakfast and lunch. Oldest son visited. Waiting for placement at [facility] home.

## 2022-11-15 NOTE — CONSULTS
[facility]    Progress Note - [facility] Inpatient [facility]osp[facility]    ______________________________________________________________________    [facility] Contact Number: [phone]    - Providers: Please contact [facility] with changes in orders or clinical plan of care   - Nursing: Please contact [facility] with significant changes in patient condition  ______________________________________________________________________           Summary of Visit (includes assessment, medications and any new orders):     Pt sitting at edge of bed, pt's son is present.  No nonverbal signs of pain or discomfort at time of assessment.  No PRN's in last 24 H.     No changes to current POC.    PC from tamika Bundy and pt's sister, Nery.  Provided update and emotional support.      JASIEL Yeager, RN  Clinical Nurse Liaison I [facility] I [facility]  Cell: [phone]  [facility] Hospice & Palliative Care [facility]  Office: [phone]  Email [email or web address]    [email or web address]

## 2022-11-15 NOTE — PROGRESS NOTES
Writer sent fax with updated clinical information to [facility] hospice home. Writer will continue to follow up with discharge planning.    Екатерина Villanueva, [facility]  [phone]

## 2022-11-15 NOTE — PLAN OF CARE
Time 0244-0752  Goal Outcome Evaluation:     Plan of Care Reviewed With: patient    Outcome Evaluation: Pt alert, slept on and off. Refused her med, yelled at this writer when attempted to give her medication. Did not want to eat dinner. Bed alarm on. No BM this shift.

## 2022-11-15 NOTE — PLAN OF CARE
Goal Outcome Evaluation:      Plan of Care Reviewed With: patient    Overall Patient Progress: no changeOverall Patient Progress: no change    Outcome Evaluation: Pt alert, MIHAI orientation, pt did not verbally respond to questions. Scheduled haldol and morphine given. Pt slept most of the shift. Bed alarm on.

## 2022-11-16 NOTE — PLAN OF CARE
Goal Outcome Evaluation:      Plan of Care Reviewed With: patient    Overall Patient Progress: no changeOverall Patient Progress: no change    Outcome Evaluation: Pt alert, intermittently sleeping. Pt restless and trying to get oob throughout shift. Scheduled morphine and haldol given. 3 loose BMs. Bed alarm on. D/c pending placement.

## 2022-11-16 NOTE — CONSULTS
[facility]    Progress Note - [facility]    ______________________________________________________________________    [facility] 24/7 Contact Number: [phone]    - Providers: Please contact [facility] with changes in orders or clinical plan of care   - Nursing: Please contact [facility] with significant changes in patient condition  ______________________________________________________________________        Plan of Care Discussed with the Following:   - Nurses:  Alfonso Marquez RN (Tel. [phone])  - Hospitalist/Rounding Provider:     Onel Villanueva PA-C    Physician Assistant    Since 11/16/2022    [phone]       - Maria Guadalupe's Family/Preferred Contact:  MarleySurjitjgina (Grandchild)   [phone] (Mobile  - Hospice Provider: Dr. Donna Guzman    Summary of Visit (includes assessment, medications and any new orders):   Patient in room curled up in ball in room. She did not respond to writer during encounter. She reportedly did not eat anything today. Senna bianca was held due to loose stools. Recommended to discontinue senna as she's not able swallow medication per Alfonso the RN. She may have dulcolox suppository if she hasn't had a BM in 3 days. However, she has consistently had loose stools.        Maria Guadalupe Cheema, RN  [phone]

## 2022-11-16 NOTE — PROGRESS NOTES
[facility]    Medicine Progress Note - Hospitalist Service, GOLD TEAM 4    Date of Admission:  11/7/2022    Assessment & Plan      Maria Guadalupe Betancourt is a 67 year old female with history of locally-advanced gallbladder cancer, HCV cirrhosis, CVA, DM2, schizophrenia, anxiety, and cognitive impairment who was admitted 10/25/22 with jaundice and progressive LE edema. Previously enrolled in hospice, discharged per family to pursue further treatment. Repeat imaging showed disease progression with intrahepatic and extrahepatic biliary dilation concerning for malignant obstruction. After further goals of care discussions and multiple care conferences, family agreeable to re-enrolling in hospice. Admitted to [facility] on 11/7/22. Please see discharged summary for further details of recent hospital stay.     Comfort Cares   Patient appears comfortable today, but not interactive, more lethargic.   - Butrans patch 5 mcg/hr  - Roxanol 10mg Q4H + 5-10mg Q2H PRN (modified to crush pills, mix with water to form a paste/solution)  - Ativan solution 0.5mg Q3H PRN   - Haldol 2mg Q6H and 1-2mg Q4H PRN (modified to crush pills, mix with water to form a paste/solution)  - Reposition as needed for comfort  - Sarna cream PRN for itching  - No labs or vitals  - Discontinued PPI as not eating/drinking, can resume if needed       Diet: Regular Diet Adult    DVT Prophylaxis: Not indicated d/t comfort cares as above  Hunter Catheter: Not present  Central Lines: None  Cardiac Monitoring: None  Code Status: No CPR- Do NOT Intubate      Disposition Plan         The patient's care was discussed with the Attending Physician, Dr. Reginald Nogueira, patient.     Onel Villanueva PA-C  Hospitalist Service, GOLD TEAM 4  [facility]  Securely message with the Vocera Web Console (learn more here)  Text page via [email or web address] Paging/Directory   Please see signed in provider for up to  date coverage information      Clinically Significant Risk Factors                               ______________________________________________________________________    Interval History   Today, patient seen in room. Not responding to questioning. Not interacting. Appears comfortable.    Data reviewed today: I reviewed all medications, new labs and imaging results over the last 24 hours. I personally reviewed no images or EKG's today.    Physical Exam   Vital Signs:                    Weight: 0 lbs 0 oz  General: Lying in bed, appears comfortable.  Skin: Profoundly jaundiced.   Remainder of exam deferred d/t patient's CC status.    Data   No lab results found in last 7 days.    No results found for this or any previous visit (from the past 24 hour(s)).  Medications       buprenorphine  1 patch Transdermal Weekly     buprenorphine  1 each Transdermal Q8H     haloperidol  2 mg Oral Q6H     morphine sulfate  10 mg Oral Q4H

## 2022-11-16 NOTE — PLAN OF CARE
Goal Outcome Evaluation:      Plan of Care Reviewed With: patient          Outcome Evaluation: Pt alert,nonverbal, verbal response limited to NO, sleeping in between cares, appears comfortable, taking scheduled morphine and haldol, occasionally tries to get out of bed otherwise relaxed in bed, taking off clothes and beddings, bed alarm on for safety.  Maria Guadalupe Cheema RN and other provider were notified on side rail(whether considered restraint or not) during rounding and will discuss and get back to nursing staff

## 2022-11-16 NOTE — PROGRESS NOTES
[facility] will not take pt due to family dynamic issues. Writer called pt's [place]  [place]  x [phone] for assistance with discharge planning.    Екатерина Villanueva [facility]  [phone]

## 2022-11-17 NOTE — PLAN OF CARE
Goal Outcome Evaluation:      Plan of Care Reviewed With: patient    Overall Patient Progress: declining    Outcome Evaluation: Pt alert, no verbal responses. No nonverbal indicators of pain present. Scheduled morphine given x1, scheduled haldol given Q6hrs. Pt had bloody nose this shift - resolved after pt cleaned up. Incontinent of bowel. Bed alarm remains on for safety.    @2235 pt unresponsive to touch and voice. Shallow breathing noted with faint pulse. Pulse ox reading 60%. Family present at bedside. @2240, no radial pulse assessed, apical pulse listened to for 1min - no pulse recorded. Breathing ceased. No recording on pulse ox. 2240 - PA Genesis Kinney paged. Dr. Domingo came to see patient and pronounced death at 2320. Vinicio Dempsey informed per family request, will see family. Organ donation informed.

## 2022-11-17 NOTE — PROGRESS NOTES
MD DEATH PRONOUNCEMENT    Called to pronounce Maria Guadalupe Beatncourt dead.    Physical Exam: Unresponsive to noxious stimuli, Spontaneous respirations absent, Breath sounds absent, Carotid pulse absent, Heart sounds absent, Pupillary light reflex absent and Corneal blink reflex absent.    Patient was pronounced dead at 11:20 PM, 2022.    Gall bladder carcinoma    Active Problems:    * No active hospital problems. *       Infectious disease present?: NO    Communicable disease present? (examples: HIV, chicken pox, TB, Ebola, CJD) :  NO    Multi-drug resistant organism present? (example: MRSA): NO    Please consider an autopsy if any of the following exist:  NO Unexpected or unexplained death during or following any dental, medical, or surgical diagnostic treatment procedures.   NO Death of mother at or up to seven days after delivery.     NO All  and pediatric deaths.     NO Death where the cause is sufficiently obscure to delay completion of the death certificate.   NO Deaths in which autopsy would confirm a suspected illness/condition that would affect surviving family members or recipients of transplanted organs.     The following deaths must be reported to the 's Office:  NO A death that may be due entirely or in part to any factors other than natural disease (recent surgery, recent trauma, suspected abuse/neglect).   NO A death that may be an accident, suicide, or homicide.     NO Any sudden, unexpected death in which there is no prior history of significant heart disease or any other condition associated with sudden death.   NO A death under suspicious, unusual, or unexpected circumstances.    NO Any death which is apparently due to natural causes but in which the  does not have a personal physician familiar with the patient s medical history, social, or environmental situation or the circumstances of the terminal event.   NO Any death apparently due to Sudden Infant Death  Syndrome.     NO Deaths that occur during, in association with, or as consequences of a diagnostic, therapeutic, or anesthetic procedure.   NO Any death in which a fracture of a major bone has occurred within the past (6) six months.   NO A death of persons note seen by their physician within 120 days of demise.     NO Any death in which the  was an inmate of a public institution or was in the custody of Law Enforcement personnel.   NO  All unexpected deaths of children   NO Solid organ donors   NO Unidentified bodies   YES Deaths of persons whose bodies are to be cremated or otherwise disposed of so that the bodies will later be unavailable for examination;   NO Deaths unattended by a physician outside of a licensed healthcare facility or licensed residential hospice program   NO Deaths occurring within 24 hours of arrival to a health care facility if death is unexpected.    NO Deaths associated with the decedent s employment.   NO Deaths attributed to acts of terrorism.   NO Any death in which there is uncertainty as to whether it is a medical examiner s care should be discussed with the medical investigator.        Body disposition: Autopsy was discussed with family member:  Granddaughter in person.  Permission for autopsy was declined.

## 2022-11-17 NOTE — PROGRESS NOTES
SPIRITUAL HEALTH SERVICES  [facility] 5B   ON-CALL VISIT     REFERRAL SOURCE: Pt death, family requesting  support     Provided emotional and spiritual support after pt's death for pt's cousin Roselyn and Pt's grandaughthomas.  Pt's daughter was also hoping to arrive but never did during our 2 hours of time together as family shared stories about the intersections of their lives and the patient Maria Guadalupe. Provided prayers of blessings, forgiveness, and remembrances.      PLAN: No further follow-up.     Rev. Roselyn Parikh MDiv, [facility]  Staff    Pager [phone]  * [facility] remains available 24/7 for emergent requests/referrals, either by having the switchboard page the on-call  or by entering an ASAP/STAT consult in Epic (this will also page the on-call ).*

## 2022-11-17 NOTE — CARE PLAN
[phone]: Family at bedside with  Roselyn. Patient's belongings sent with family. Hospice nurse Susan informed of patients' death. Organ donation center called back and patient is not eligible for donation due to history of hepatitis. Post mortem care complete. Body and paperwork sent to [facility] with security around 5am.

## 2022-11-29 NOTE — ED NOTES
[facility] unable to take report at this time. Will call back in 15-30 minutes.   Include Pregnancy/Lactation Warning?: No

## 2022-12-08 NOTE — DISCHARGE SUMMARY
[facility]    Death Summary - [unfilled]     Date of Admission:  11/7/2022  Date of Death: 11/17/2022  4:57 AM  Discharging Provider: Akira Nogueira MD    Discharge Diagnoses   Cholangiocarcinoma, metastatic  HCV cirrhosis  Liver failure  DM-2  CVA  Schizophrenia  Anxiety  Cognitive impairment  UTI    Cause of death: Cholangiocarcinoma    Hospital Course    67F with cholangiocarcinoma, HCV cirrhosis on hospice prior to arrival presented for end of life cares, assistance with LE edema, pain control anxiety and jaundice. Goals of care discussion led to consensus to provide comfort cares when it was clear that jaundice was caused by obstructive cholangiocarcinoma per GI and heme/onc. Pt treated for presumed SBP, GNB UTI, anxiety, diabetes, schizophrenia and was treated with dignity and respect until she passed peacefully. Pt confused, but answered yes/no questions.      Akira Nogueira MD  [facility]  ______________________________________________________________________      Significant Results and Procedures   Most Recent 3 CBC's:Recent Labs   Lab Test 10/28/22  0956 10/25/22  2339 08/17/20  2222   WBC 6.1 6.5 6.6   HGB 10.3* 9.5* 13.9   MCV 99 95 86    320 118*     Most Recent 3 BMP's:Recent Labs   Lab Test 11/06/22  0849 11/06/22  0152 11/05/22  1341 10/28/22  1312 10/28/22  0956 10/27/22  2147 10/27/22  2008 10/26/22  0830 10/25/22  2339 08/17/20  2222   NA  --   --   --   --  137  --   --   --  139 135   POTASSIUM  --   --   --   --  2.9*  --   --   --  3.2* 3.9   CHLORIDE  --   --   --   --  97*  --   --   --  105 105   CO2  --   --   --   --  17*  --   --   --  25 22   BUN  --   --   --   --  11.8  --   --   --  12 15   CR  --   --   --   --  0.69  --  0.59  --  0.63 0.44*   ANIONGAP  --   --   --   --  23*  --   --   --  9 8   LAKESHA  --   --   --   --  9.4  --   --   --  8.4* 9.1    * 144* 138*   < > 136*   < >  --    < > 195* 398*    < > = values in this interval not displayed.     Most Recent 2 LFT's:Recent Labs   Lab Test 10/28/22  0956 10/25/22  2339   AST 72*  --    ALT 23 25   ALKPHOS 428* 448*   BILITOTAL 23.1* 21.0*     Most Recent 3 INR's:Recent Labs   Lab Test 10/28/22  0956 10/25/22  2339   INR 4.48* 2.42*     7-Day Micro Results     No results found for the last 168 hours.        Most Recent Urinalysis:Recent Labs   Lab Test 11/03/22  1741   COLOR Orange*   APPEARANCE Slightly Cloudy*   URINEGLC 100*   URINEBILI Large*   URINEKETONE Negative   SG 1.032   UBLD Moderate*   URINEPH 6.5   PROTEIN 300*   NITRITE Negative   LEUKEST Moderate*   RBCU 90*   WBCU 39*     Most Recent ESR & CRP:No lab results found.,   Results for orders placed or performed during the hospital encounter of 10/25/22   US Lower Extremity Venous Duplex Bilateral    Narrative    EXAM: US LOWER EXTREMITY VENOUS DUPLEX BILATERAL  LOCATION: [facility]  DATE/TIME: 10/26/2022 1:17 AM    INDICATION: Bilateral lower extremity soft tissue edema.  COMPARISON: None.  TECHNIQUE: Venous Duplex ultrasound of bilateral lower extremities with and without compression, augmentation and duplex. Color flow and spectral Doppler with waveform analysis performed.    FINDINGS: Exam includes the common femoral, femoral, popliteal veins as well as segmentally visualized deep calf veins and greater saphenous vein.     RIGHT: No deep vein thrombosis. No superficial thrombophlebitis. No popliteal cyst.    LEFT: No deep vein thrombosis. No superficial thrombophlebitis. No popliteal cyst.    Bilateral lower extremity subcutaneous edema.      Impression    IMPRESSION:  1.  No deep venous thrombosis in the bilateral lower extremities.    2.  Bilateral lower extremity subcutaneous edema.   POC US ABDOMEN LIMITED    Impression    This ultrasound was not done   US Abdomen Limited    Narrative     EXAM: US ABDOMEN LIMITED  LOCATION: [facility]  DATE/TIME: 10/26/2022 12:53 AM    INDICATION: Worsening liver failure.  COMPARISON: None.  TECHNIQUE: Limited abdominal ultrasound.    FINDINGS:    GALLBLADDER: Surgically absent.    BILE DUCTS: Intrahepatic and extrahepatic biliary dilatation. The common duct measures 17 mm.    LIVER: Mild coarsened hepatic echotexture. No surface contour nodularity. Patent portal vein.    RIGHT KIDNEY: No hydronephrosis. 10.2 cm in length.    PANCREAS: The visualized portions are normal.    Minimal ascites.      Impression    IMPRESSION:  1.  Mild coarsened hepatic echotexture which can be seen with chronic underlying hepatic parenchymal disease. Recommend correlation with liver function studies.    2.  Patent portal vein.    3.  Intrahepatic and extrahepatic biliary dilatation. Recommend correlation for biliary obstruction consideration of MRCP to further evaluate the common bile duct and the pancreatic head.    4.  Minimal ascites surrounding the liver and right upper quadrant.       Chest XR,  PA & LAT    Narrative    EXAM: XR CHEST 2 VIEWS  LOCATION: [facility]  DATE/TIME: 10/26/2022 3:40 AM    INDICATION: cough  COMPARISON: Chest radiograph 08/17/2020      Impression    IMPRESSION: Relatively low lung volumes which exaggerates the pulmonary vasculature. The heart size is normal. There are linear opacities of the left midlung favored to represent subsegmental atelectasis. Patchy opacity at the left lower lung which could   represent additional atelectasis however infectious infiltrate is also a consideration and attention on follow-up is recommended. There are atherosclerotic calcifications of the aortic arch. No acute osseous abnormality.   CT Abdomen Pelvis w/o Contrast    Narrative    EXAMINATION: CT ABDOMEN PELVIS W/O CONTRAST, 10/28/2022 4:35 PM    TECHNIQUE:  Helical CT images from  the lung bases through the  symphysis pubis were obtained without IV contrast. Contrast dose:  none. Contrast was not administered as patient declined IV contrast.    COMPARISON: The report from a CT chest abdomen and pelvis 7/25/2022 is  reviewed however images are not available for direct comparison.  Ultrasound abdomen 10/26/2022.    HISTORY: biliary obstruction r/o    FINDINGS: Exam is slightly limited by respiratory motion    Liver: Prominent left lobe of the liver. No visualized hepatic mass on  this noncontrast exam. Mildly irregular surface contours.  Biliary system: Cholecystectomy.. There is significant intrahepatic  biliary dilatation most prominent within the left lobe of the liver as  well as segments 1, 5 and 8 and to a lesser degree in segment 6 and 7  leading up to the extrahepatic biliary system. Common duct beyond this  is difficult to identify on this noncontrast CT, but  does not appear  significantly distended in the region of the pancreatic head/ampulla.  Pancreas: Normal noncontrast appearance of the pancreatic parenchyma,  no pancreatic ductal dilatation.  Stomach: Within normal limits.  Spleen: Within normal limits.  Adrenal glands: Within normal limits.  Kidneys: No focal mass, hydronephrosis, or stone.  Bladder: Within normal limits.  Reproductive organs: Calcified uterine fibroids, surgical material in  the low pelvis likely related to prior tubal ligation.  Colon: Diverticular disease of the sigmoid colon with a mild amount of  pericolonic fat stranding and mild wall thickening.  Appendix: Not definitely visualized.  Small Bowel: Normal in caliber  Lymph nodes: No intra-abdominal or pelvic lymphadenopathy.  Vasculature: . A bandlike atherosclerosis, normal caliber of the  abdominal aorta.  Peritoneum: Small intra-abdominal ascites. No intraperitoneal free  air.     Lung bases: Small amount of basilar and right middle lobe atelectasis.  Coronary artery calcification.    Bones and soft  tissues: No suspicious soft tissue mass or fluid  collection. No suspicious osseous lesion. Cranial migration of the  left total hip arthroplasty acetabular cup. Heterotopic ossification.      Impression    IMPRESSION:   1. Prominent intrahepatic biliary dilatation leading up to the  confluence of the central and left bile ducts and to a lesser degree  the right bile duct which is suspicious for obstructing tumor such as  the patient's known history of locally advanced gallbladder carcinoma.  This appears to be new from CT report 7/25/2022. Images not available  for direct comparison. If the patient is agreeable to an intravenous  contrast study, recommend follow-up CT or MRI/MRCP study.  2. Mild perihepatic and low pelvic ascites.  3. Concern for cranial rotation of the left total hip arthroplasty  acetabular cup, comparison with outside exams if they can be obtained  would be helpful to determine chronicity   4. Small amount of fat stranding about the sigmoid colon with possible  wall thickening which may be related to generalized mesenteric edema  or superimposed uncomplicated diverticulitis. Correlation with any GI  symptoms are focal tenderness in this region.  5. Limited evaluation for metastatic disease on noncontrast exam which  can be reevaluated on any follow-up contrast study.    I have personally reviewed the examination and initial interpretation  and I agree with the findings.    DAVID LUCAS MD         SYSTEM ID:  [id number]       Consultations This Hospital Stay   PHARMACY IP CONSULT    Primary Care Physician   Gulshan Adam    Time Spent on this Encounter   I, Akira Nogueira MD, personally saw the patient today and spent greater than 30 minutes discharging this patient.